# Patient Record
Sex: MALE | Race: BLACK OR AFRICAN AMERICAN | NOT HISPANIC OR LATINO
[De-identification: names, ages, dates, MRNs, and addresses within clinical notes are randomized per-mention and may not be internally consistent; named-entity substitution may affect disease eponyms.]

---

## 2019-06-19 PROBLEM — Z00.00 ENCOUNTER FOR PREVENTIVE HEALTH EXAMINATION: Status: ACTIVE | Noted: 2019-06-19

## 2019-07-02 ENCOUNTER — APPOINTMENT (OUTPATIENT)
Dept: VASCULAR SURGERY | Facility: CLINIC | Age: 69
End: 2019-07-02
Payer: MEDICARE

## 2019-07-02 VITALS
WEIGHT: 315 LBS | HEIGHT: 70 IN | DIASTOLIC BLOOD PRESSURE: 90 MMHG | SYSTOLIC BLOOD PRESSURE: 140 MMHG | BODY MASS INDEX: 45.1 KG/M2

## 2019-07-02 DIAGNOSIS — Z78.9 OTHER SPECIFIED HEALTH STATUS: ICD-10-CM

## 2019-07-02 DIAGNOSIS — Z87.19 PERSONAL HISTORY OF OTHER DISEASES OF THE DIGESTIVE SYSTEM: ICD-10-CM

## 2019-07-02 DIAGNOSIS — Z87.891 PERSONAL HISTORY OF NICOTINE DEPENDENCE: ICD-10-CM

## 2019-07-02 PROCEDURE — 93970 EXTREMITY STUDY: CPT

## 2019-07-02 PROCEDURE — 99203 OFFICE O/P NEW LOW 30 MIN: CPT

## 2019-08-08 NOTE — DATA REVIEWED
[FreeTextEntry1] : Venous duplex on the right no evidence of acute deep venous thrombosis seen lotion B. from the groin to the knee. \par \par Left leg no evidence of acute deep venous thrombosis seen from the groin to the knee the common femorals with facial femoral popliteal veins are patent. There superficial femoral phlebitis seen the greater saphenous vein below the knee. The veins in the calf and not visualized secondary to severe edema.

## 2019-08-08 NOTE — HISTORY OF PRESENT ILLNESS
[FreeTextEntry1] : The patient is a 68-year-old male who was morbidly obese presents with left worse than right lower extremity swelling. The patient has informed me his leg initially swelled after his left total knee replacement. The patient developed a postoperative infection at that time. As patient states his swelling has decreased and now has recurred the last few months.

## 2019-11-08 ENCOUNTER — APPOINTMENT (OUTPATIENT)
Dept: VASCULAR SURGERY | Facility: CLINIC | Age: 69
End: 2019-11-08
Payer: MEDICARE

## 2019-11-08 PROCEDURE — 99212 OFFICE O/P EST SF 10 MIN: CPT

## 2019-11-08 NOTE — ASSESSMENT
[FreeTextEntry1] : 68 y/o gentleman with obesity, and chronic leg swelling presents for follow up. He has been using compression stockings and swelling has been improving.\par I have explained to him that he has venous insufficiency and will benefit from weight loss and compression therapy. No surgical intervention is needed at this time.

## 2019-11-22 ENCOUNTER — INPATIENT (INPATIENT)
Facility: HOSPITAL | Age: 69
LOS: 3 days | Discharge: HOME | End: 2019-11-26
Attending: INTERNAL MEDICINE | Admitting: INTERNAL MEDICINE
Payer: MEDICARE

## 2019-11-22 VITALS
RESPIRATION RATE: 18 BRPM | WEIGHT: 315 LBS | DIASTOLIC BLOOD PRESSURE: 62 MMHG | HEART RATE: 90 BPM | TEMPERATURE: 96 F | OXYGEN SATURATION: 98 % | SYSTOLIC BLOOD PRESSURE: 109 MMHG

## 2019-11-22 DIAGNOSIS — Z90.49 ACQUIRED ABSENCE OF OTHER SPECIFIED PARTS OF DIGESTIVE TRACT: Chronic | ICD-10-CM

## 2019-11-22 LAB
ALBUMIN SERPL ELPH-MCNC: 4 G/DL — SIGNIFICANT CHANGE UP (ref 3.5–5.2)
ALP SERPL-CCNC: 53 U/L — SIGNIFICANT CHANGE UP (ref 30–115)
ALT FLD-CCNC: 40 U/L — SIGNIFICANT CHANGE UP (ref 0–41)
ANION GAP SERPL CALC-SCNC: 12 MMOL/L — SIGNIFICANT CHANGE UP (ref 7–14)
APTT BLD: 26.2 SEC — LOW (ref 27–39.2)
AST SERPL-CCNC: 35 U/L — SIGNIFICANT CHANGE UP (ref 0–41)
BILIRUB SERPL-MCNC: 0.9 MG/DL — SIGNIFICANT CHANGE UP (ref 0.2–1.2)
BLD GP AB SCN SERPL QL: SIGNIFICANT CHANGE UP
BUN SERPL-MCNC: 45 MG/DL — HIGH (ref 10–20)
CALCIUM SERPL-MCNC: 9.1 MG/DL — SIGNIFICANT CHANGE UP (ref 8.5–10.1)
CHLORIDE SERPL-SCNC: 106 MMOL/L — SIGNIFICANT CHANGE UP (ref 98–110)
CO2 SERPL-SCNC: 23 MMOL/L — SIGNIFICANT CHANGE UP (ref 17–32)
CREAT SERPL-MCNC: 1.5 MG/DL — SIGNIFICANT CHANGE UP (ref 0.7–1.5)
GLUCOSE SERPL-MCNC: 96 MG/DL — SIGNIFICANT CHANGE UP (ref 70–99)
HCT VFR BLD CALC: 37.7 % — LOW (ref 42–52)
HGB BLD-MCNC: 12 G/DL — LOW (ref 14–18)
INR BLD: 1.04 RATIO — SIGNIFICANT CHANGE UP (ref 0.65–1.3)
LACTATE SERPL-SCNC: 1.2 MMOL/L — SIGNIFICANT CHANGE UP (ref 0.5–2.2)
LIDOCAIN IGE QN: 19 U/L — SIGNIFICANT CHANGE UP (ref 7–60)
MCHC RBC-ENTMCNC: 27.2 PG — SIGNIFICANT CHANGE UP (ref 27–31)
MCHC RBC-ENTMCNC: 31.8 G/DL — LOW (ref 32–37)
MCV RBC AUTO: 85.5 FL — SIGNIFICANT CHANGE UP (ref 80–94)
NRBC # BLD: 0 /100 WBCS — SIGNIFICANT CHANGE UP (ref 0–0)
PLATELET # BLD AUTO: 143 K/UL — SIGNIFICANT CHANGE UP (ref 130–400)
POTASSIUM SERPL-MCNC: 5.4 MMOL/L — HIGH (ref 3.5–5)
POTASSIUM SERPL-SCNC: 5.4 MMOL/L — HIGH (ref 3.5–5)
PROT SERPL-MCNC: 6.3 G/DL — SIGNIFICANT CHANGE UP (ref 6–8)
PROTHROM AB SERPL-ACNC: 11.9 SEC — SIGNIFICANT CHANGE UP (ref 9.95–12.87)
RBC # BLD: 4.41 M/UL — LOW (ref 4.7–6.1)
RBC # FLD: 14.8 % — HIGH (ref 11.5–14.5)
SODIUM SERPL-SCNC: 141 MMOL/L — SIGNIFICANT CHANGE UP (ref 135–146)
WBC # BLD: 5.27 K/UL — SIGNIFICANT CHANGE UP (ref 4.8–10.8)
WBC # FLD AUTO: 5.27 K/UL — SIGNIFICANT CHANGE UP (ref 4.8–10.8)

## 2019-11-22 PROCEDURE — 74177 CT ABD & PELVIS W/CONTRAST: CPT | Mod: 26

## 2019-11-22 PROCEDURE — 99285 EMERGENCY DEPT VISIT HI MDM: CPT

## 2019-11-22 RX ORDER — INSULIN LISPRO 100/ML
VIAL (ML) SUBCUTANEOUS
Refills: 0 | Status: DISCONTINUED | OUTPATIENT
Start: 2019-11-22 | End: 2019-11-25

## 2019-11-22 RX ORDER — SACUBITRIL AND VALSARTAN 24; 26 MG/1; MG/1
1 TABLET, FILM COATED ORAL
Refills: 0 | Status: DISCONTINUED | OUTPATIENT
Start: 2019-11-22 | End: 2019-11-22

## 2019-11-22 RX ORDER — METOPROLOL TARTRATE 50 MG
25 TABLET ORAL DAILY
Refills: 0 | Status: DISCONTINUED | OUTPATIENT
Start: 2019-11-22 | End: 2019-11-22

## 2019-11-22 RX ORDER — DEXTROSE 50 % IN WATER 50 %
12.5 SYRINGE (ML) INTRAVENOUS ONCE
Refills: 0 | Status: DISCONTINUED | OUTPATIENT
Start: 2019-11-22 | End: 2019-11-26

## 2019-11-22 RX ORDER — CHOLECALCIFEROL (VITAMIN D3) 125 MCG
1000 CAPSULE ORAL DAILY
Refills: 0 | Status: DISCONTINUED | OUTPATIENT
Start: 2019-11-22 | End: 2019-11-26

## 2019-11-22 RX ORDER — PANTOPRAZOLE SODIUM 20 MG/1
40 TABLET, DELAYED RELEASE ORAL
Refills: 0 | Status: DISCONTINUED | OUTPATIENT
Start: 2019-11-22 | End: 2019-11-26

## 2019-11-22 RX ORDER — DEXTROSE 50 % IN WATER 50 %
15 SYRINGE (ML) INTRAVENOUS ONCE
Refills: 0 | Status: DISCONTINUED | OUTPATIENT
Start: 2019-11-22 | End: 2019-11-25

## 2019-11-22 RX ORDER — ASCORBIC ACID 60 MG
500 TABLET,CHEWABLE ORAL DAILY
Refills: 0 | Status: DISCONTINUED | OUTPATIENT
Start: 2019-11-22 | End: 2019-11-26

## 2019-11-22 RX ORDER — ATORVASTATIN CALCIUM 80 MG/1
80 TABLET, FILM COATED ORAL AT BEDTIME
Refills: 0 | Status: DISCONTINUED | OUTPATIENT
Start: 2019-11-22 | End: 2019-11-26

## 2019-11-22 RX ORDER — GLUCAGON INJECTION, SOLUTION 0.5 MG/.1ML
1 INJECTION, SOLUTION SUBCUTANEOUS ONCE
Refills: 0 | Status: DISCONTINUED | OUTPATIENT
Start: 2019-11-22 | End: 2019-11-26

## 2019-11-22 RX ORDER — SODIUM CHLORIDE 9 MG/ML
1000 INJECTION, SOLUTION INTRAVENOUS
Refills: 0 | Status: DISCONTINUED | OUTPATIENT
Start: 2019-11-22 | End: 2019-11-26

## 2019-11-22 RX ORDER — METOPROLOL TARTRATE 50 MG
25 TABLET ORAL
Refills: 0 | Status: DISCONTINUED | OUTPATIENT
Start: 2019-11-23 | End: 2019-11-26

## 2019-11-22 RX ORDER — IOHEXOL 300 MG/ML
30 INJECTION, SOLUTION INTRAVENOUS ONCE
Refills: 0 | Status: COMPLETED | OUTPATIENT
Start: 2019-11-22 | End: 2019-11-22

## 2019-11-22 RX ORDER — PREGABALIN 225 MG/1
1000 CAPSULE ORAL DAILY
Refills: 0 | Status: DISCONTINUED | OUTPATIENT
Start: 2019-11-22 | End: 2019-11-26

## 2019-11-22 RX ADMIN — IOHEXOL 30 MILLILITER(S): 300 INJECTION, SOLUTION INTRAVENOUS at 14:54

## 2019-11-22 RX ADMIN — PANTOPRAZOLE SODIUM 40 MILLIGRAM(S): 20 TABLET, DELAYED RELEASE ORAL at 23:22

## 2019-11-22 NOTE — H&P ADULT - HISTORY OF PRESENT ILLNESS
68 yo M with PMHx of CKD (Unknown baseline), HFrEF (On Entresto),  LGIB secondary to Diverticulitis s/p colon resection (at [facility] about 5 years ago), CAD presenting with Melena.  Patient states that in the evening of 11/20, the patient consumed  peanut brittle and  bright red blood  in the toilet boil mixed with formed stool.  This episode was followed by watery diarrhea, with mixed blood, with a rumbling feeling in his abdomen. He had no pain with defecation at the time.   In the morning he had another episode of bloody diarrhea and came to the ED.  In the ED his hemoglobin was 12, and he was admitted for monitoring and possible intervention.  While in the ED he had one bowel movement that he states was completely black.  He denies recent weight loss, stating that he recently gained >50 pounds due to poor diet. Of note he has a recent RLE cellulitis that he was   treated for by his PMD.  He has not followed up with a GI doctor since his last episode of Diverticulitis, and has not had a colonoscopy in five years.  He denies GERD symptoms or use of NSAIDs. 68 yo M with PMHx of CKD (Unknown baseline), HFrEF (On Entresto), HTN, HLD, DM (A1c 6.5 per patient), LGIB secondary to Diverticulitis s/p colon resection (at [facility] about 5 years ago), CAD presenting with Melena.  Patient states that in the evening of 11/20, the patient consumed  peanut brittle and  bright red blood  in the toilet boil mixed with formed stool.  This episode was followed by watery diarrhea, with mixed blood, with a rumbling feeling in his abdomen. He had no pain with defecation at the time.   In the morning he had another episode of bloody diarrhea and came to the ED.  In the ED his hemoglobin was 12, and he was admitted for monitoring and possible intervention.  While in the ED he had one bowel movement that he states was completely black.  He denies recent weight loss, stating that he recently gained >50 pounds due to poor diet. Of note he has a recent RLE cellulitis that he was   treated for by his PMD.  He has not followed up with a GI doctor since his last episode of Diverticulitis, and has not had a colonoscopy in five years.  He denies GERD symptoms or use of NSAIDs.

## 2019-11-22 NOTE — H&P ADULT - NSHPPHYSICALEXAM_GEN_ALL_CORE
CONSTITUTIONAL: NAD, obese, nontoxic appearing, comfortable siting on stretcher  ENMT: EOMI, PERRLA, No tonsillar erythema, exudates, or enlargement, neck supple, No JVD  PSYCH: Alert & Oriented X3  RESPIRATORY: Clear to percussion bilaterally; No rales, rhonchi, wheezing, or rubs  CARDIOVASCULAR: Regular rate and rhythm; No murmurs, rubs, or gallops, bilateral nonpitting edema   GASTROINTESTINAL: Soft, Nontender, Nondistended; Bowel sounds present  EXTREMITIES:  No clubbing, cyanosis  SKIN: Chronic venous insufficiency changes over bilateral shins GENERAL: NAD, obese, nontoxic appearing, comfortable sitting on bed   PSYCH: Alert & Oriented X3  HEENT:  Atraumatic, Normocephalic. EOMI, PERRLA, conjunctiva clear, sclera white, No JVD  PULMONARY: Clear to auscultation bilaterally; No wheeze  CARDIOVASCULAR: Regular rate and rhythm; No murmurs, rubs, or gallops  GASTROINTESTINAL: Soft, Nontender, Nondistended; Bowel sounds present  MUSCULOSKELETAL:  2+ Peripheral Pulses, No clubbing, cyanosis,  1+ LE Edema B/L   NEUROLOGY: non-focal  SKIN:  chronic venous statis changes on LE B/L, healed RLE lesion (possibly cellulitis)

## 2019-11-22 NOTE — ED PROVIDER NOTE - NS ED ROS FT
Eyes:  No visual changes, eye pain or discharge.  ENMT:  No hearing changes, pain, discharge or infections. No neck pain or stiffness.  Cardiac:  No chest pain, SOB or edema. No chest pain with exertion.  Respiratory:  No cough or respiratory distress. No hemoptysis. No history of asthma or RAD.  GI:  BRBPR, abd pain. No nausea, vomiting, diarrhea.  :  No dysuria, frequency or burning.  MS:  No myalgia, muscle weakness, joint pain or back pain.  Neuro:  No headache or weakness.  No LOC.  Skin:  No skin rash.   Endocrine: No history of thyroid disease or diabetes.

## 2019-11-22 NOTE — H&P ADULT - NSHPSOCIALHISTORY_GEN_ALL_CORE
Recent Travel: Drove to [place], [place] last week     Substance Use (street drugs): Denies   Tobacco Usage:  (   ) never smoked   ( x  ) former smoker   (   ) current smoker  (   40  ) pack year  Alcohol Usage: None

## 2019-11-22 NOTE — ED ADULT NURSE NOTE - NSIMPLEMENTINTERV_GEN_ALL_ED
Implemented All Universal Safety Interventions:  [place] to call system. Call bell, personal items and telephone within reach. Instruct patient to call for assistance. Room bathroom lighting operational. Non-slip footwear when patient is off stretcher. Physically safe environment: no spills, clutter or unnecessary equipment. Stretcher in lowest position, wheels locked, appropriate side rails in place.

## 2019-11-22 NOTE — ED PROVIDER NOTE - CARE PLAN
Principal Discharge DX:	GI bleed Principal Discharge DX:	GI bleed  Secondary Diagnosis:	Rectal bleeding

## 2019-11-22 NOTE — ED PROVIDER NOTE - OBJECTIVE STATEMENT
69 y m pmh cad, diverticuli requiring colon resection pw brbpr. Loose stools mixed with blood coating the toilet bowl for the past few days. Associated with R periumbilical abd pain. No radiation, no alleviating or exacerbating factors. No blood thinner or frequent nsaid use. Denies fever, chills, n/v, back pain, cp, sob, urinary sx.

## 2019-11-22 NOTE — H&P ADULT - ASSESSMENT
68 yo M with PMHx of CKD (Unknown baseline), HFrEF (On Entresto),  LGIB secondary to Diverticulitis s/p colon resection (at [facility] about 5 years ago), CAD presenting with Melena.    #) GIB   -check cbc, pt ptt, bmp (bun:cr >25 suggests ugib)  -maintain active t/s,  txf if hgb <7, 2-18 gauge IV access   -check orthostatic vs  -ivf: will hold off on iv as has HFrEF  -iv ppi bid  -gi eval (egd/colono)   -NPO for now    #)  Right renal calculus  -CT Abdomen and Pelvis-Punctate nonobstructive right renal calculus  -monitor for symptoms, outpatient Nephrology     #) Bilateral inguinal lymph nodes, possibly reactive  -CT A/P- measuring up to 1.3 cm on the left  -outpatient workup    #) HFrEF  -currently with LE edema, lungs CTA B/L  -Takes highest dose Entresto at home, hold if BP drops  -Allergic to ACE, States allergic to Arbs too, but on Entresto  -Takes Lasix 20 mg twice a week, will hold for now, cont BB    #) CAD  -Resume aspirin when GIB resolves, cont statin     # SAGE vs. ?CKD  -f/u Cr in AM  -Avoid nephrotoxic agents     #) Suspected SAIDA  -STOP-BANG >5   -outpatient pulmonary evaluation for PFTs and polysomnography testing     #) Bilateral Lower Extremity Lymphadenopathy, Chronic changes, Swelling  -F/U Venous Duplex    #) Super Obese  -BMI >50  -Counseled on healthy Diet and exercise practices    Diet: NPO for now    CHG     GI ppx: Pantoprazole BID     Disposition: Pending GI Eval 70 yo M with PMHx of CKD (Unknown baseline), HFrEF (On Entresto),  LGIB secondary to Diverticulitis s/p colon resection (at [facility] about 5 years ago), CAD presenting with Melena.    #) GIB   -check cbc, pt ptt, bmp (bun:cr >25 suggests ugib)  -maintain active t/s,  txf if hgb <7, 2-18 gauge IV access   -check orthostatic vs  -ivf: will hold off on iv as has HFrEF  -iv ppi bid  -gi eval (egd/colono)   -NPO for now    #)  Right renal calculus  -CT Abdomen and Pelvis-Punctate nonobstructive right renal calculus  -monitor for symptoms, outpatient Nephrology     #) Bilateral inguinal lymph nodes, possibly reactive  -CT A/P- measuring up to 1.3 cm on the left  -outpatient workup    #) HFrEF  -currently with LE edema, lungs CTA B/L  -Takes highest dose Entresto at home, hold in case BP drops  -If BP elevated restart at lower dose   -Allergic to ACE, States allergic to Arbs too, but takes  Entresto  -Takes Lasix 20 mg twice a week, will hold for now, cont BB    #) CAD  -Hold ASA for now due to active GIB  - cont statin     # SAGE vs. ?CKD  -f/u Cr in AM  -Avoid nephrotoxic agents     #) Suspected SAIDA  -STOP-BANG >5   -outpatient pulmonary evaluation for PFTs and polysomnography testing     #) Bilateral Lower Extremity Lymphadenopathy, Chronic changes, Swelling  -F/U Venous Duplex    #) Super Obese  -BMI >50  -Counseled on healthy Diet and exercise practices    Diet: NPO for now    CHG     GI ppx: Pantoprazole BID     Disposition: Pending GI Eval 68 yo M with PMHx of CKD (Unknown baseline), HFrEF (On Entresto), HTN, HLD, DM (A1c 6.5 per patient), LGIB secondary to Diverticulitis s/p colon resection (at [facility] about 5 years ago), CAD presenting with Melena.     #) GIB   -check cbc, pt ptt, bmp (bun:cr >25 suggests ugib)  -maintain active t/s,  txf if hgb <7, 2-18 gauge IV access   -check orthostatic vs  -ivf: will hold off on iv as has HFrEF  -iv ppi bid  -gi eval (egd/colono)   -NPO for now    #) HFrEF  -currently with LE edema, lungs CTA B/L  -Takes highest dose Entresto at home, hold in case BP drops  -If BP elevated restart at lower dose   -Allergic to ACE, States allergic to Arbs too, but takes  Entresto  -Takes Lasix 20 mg twice a week, will hold for now, cont BB    #) CAD  -Hold ASA for now due to active GIB  - cont statin     # SAGE vs. ?CKD  -f/u Cr in AM  -Avoid nephrotoxic agents     #) DM  -not on oral meds  -check fs  -start and adjust insulin s/s prn     #)  Right renal calculus  -CT Abdomen and Pelvis-Punctate nonobstructive right renal calculus  -monitor for symptoms, outpatient Nephrology     #) Bilateral inguinal lymph nodes, possibly reactive  -CT A/P- measuring up to 1.3 cm on the left  -outpatient workup      #) Suspected SAIDA  -STOP-BANG >5   -outpatient pulmonary evaluation for PFTs and polysomnography testing     #) Bilateral Lower Extremity Lymphadenopathy, Chronic changes, Swelling  -F/U Venous Duplex    #) Super Obese  -BMI >50  -Counseled on healthy Diet and exercise practices    Diet: NPO for now    CHG     GI ppx: Pantoprazole BID     Disposition: Pending GI Eval

## 2019-11-22 NOTE — ED ADULT NURSE REASSESSMENT NOTE - NS ED NURSE REASSESS COMMENT FT1
patient is being transferred to . patient aox3. no indication of pain or discomfort. transported to the floor on a wheelchair.

## 2019-11-22 NOTE — ED PROVIDER NOTE - CLINICAL SUMMARY MEDICAL DECISION MAKING FREE TEXT BOX
Pt with bloody bowel movements, h/o diverticulitis, labs and CT reassuring but pt has continued to have bloody BM in ED.  uncertain pt's reliability to f/u with GI outpt, will admit to med for eval and management

## 2019-11-22 NOTE — H&P ADULT - NSICDXPASTMEDICALHX_GEN_ALL_CORE_FT
PAST MEDICAL HISTORY:  Diabetes     Diverticulitis     Dyslipidemia     Heart failure     Hypertension

## 2019-11-22 NOTE — H&P ADULT - ATTENDING COMMENTS
PHYSICAL EXAM:    CONSTITUTIONAL: NAD, obese, nontoxic appearing, comfortable siting on stretcher  ENMT: EOMI, PERRLA, No tonsillar erythema, exudates, or enlargement, neck supple, No JVD  PSYCH: Alert & Oriented X3  RESPIRATORY: Clear to percussion bilaterally; No rales, rhonchi, wheezing, or rubs  CARDIOVASCULAR: Regular rate and rhythm; No murmurs, rubs, or gallops, bilateral nonpitting edema   GASTROINTESTINAL: Soft, Nontender, Nondistended; Bowel sounds present  EXTREMITIES:  No clubbing, cyanosis  SKIN: Chronic venous insufficiency changes over bilateral shins     68 yo M with PMHx of CKD III, Polysubstance Abuse (clean for 17 years), LGIB secondary to Diverticulitis s/p colon resection (at [facility] 4 years ago), CAD presented with complaint of initial BRBPR followed by melena. History dates back to Wednesday evening after patient consumed peanut brittle, had one formed stool with bright red blood noticed in toilet bowel around 19:00, patient then had watery diarrhea with darker blood coloration around 23:00, felt his stomach "rumbling", denied any pain, fever, nausea, vomiting, endorses chronic chills. Patient then awoke and had another episode of bloody diarrhea around 9AM, reported to ED for further evaluation. Patient's last bowel movement occurred in ED around 19:00 states it was completely "black" in coloration. ROS significant for 50 pound intentional weight gain over the past year secondary to excess caloric consumption, patient has recent suspected RLE cellulitis /p completion of antibiotics last month given by PMD, endorses chronic shortness of breath on exertion for the past 2 years attributed to "sleep apnea" though patient denies having had work up states he knows he needs to go. Patient has traveled to numerous states, having visits [place] 5 days ago, drove.    #GIB  -Patient remains hemodynamically stable  -Awaiting Gastroenterology evaluation   -NPO, advance diet as tolerated  -Continue Protonix IV BID  -Maintain active type and screen  -Bilateral 18 gauge IV access     #Punctate nonobstructive right renal calculus found on CT Abdomen and Pelvis  -Results relayed to patient, advised to monitor for symptoms, defer to outpatient Nephrology management    #Bilateral inguinal lymph nodes, measuring up to 1.3 cm on the left  -Could be reactive?   -Defer to outpatient workup    #CAD    #Suspected Underlying SAIDA  -Advised outpatient pulmonary evaluation for PFTs and polysomnography testing     #Bilateral Lower Extremity Lymphadenopathy   -F/U Venous Duplex    Disposition: Home when medically stable. PHYSICAL EXAM:    CONSTITUTIONAL: NAD, obese, nontoxic appearing, comfortable siting on stretcher  ENMT: EOMI, PERRLA, No tonsillar erythema, exudates, or enlargement, neck supple, No JVD  PSYCH: Alert & Oriented X3  RESPIRATORY: Clear to percussion bilaterally; No rales, rhonchi, wheezing, or rubs  CARDIOVASCULAR: Regular rate and rhythm; No murmurs, rubs, or gallops, bilateral nonpitting edema   GASTROINTESTINAL: Soft, Nontender, Nondistended; Bowel sounds present  EXTREMITIES:  No clubbing, cyanosis  SKIN: Chronic venous insufficiency changes over bilateral shins     68 yo M with PMHx of CKD III, Polysubstance Abuse (clean for 17 years), LGIB secondary to Diverticulitis s/p colon resection (at [facility] 4 years ago), CAD presented with complaint of initial BRBPR followed by melena. History dates back to Wednesday evening after patient consumed peanut brittle, had one formed stool with bright red blood noticed in toilet bowel around 19:00, patient then had watery diarrhea with darker blood coloration around 23:00, felt his stomach "rumbling", denied any pain, fever, nausea, vomiting, endorses chronic chills. Patient then awoke and had another episode of bloody diarrhea around 9AM, reported to ED for further evaluation. Patient's last bowel movement occurred in ED around 19:00 states it was completely "black" in coloration. ROS significant for 50 pound intentional weight gain over the past year secondary to excess caloric consumption, patient has recent suspected RLE cellulitis /p completion of antibiotics last month given by PMD, endorses chronic shortness of breath on exertion for the past 2 years attributed to "sleep apnea" though patient denies having had work up states he knows he needs to go. Patient has traveled to numerous states, having visits [place] 5 days ago, drove.    #GIB  -Patient remains hemodynamically stable  -Awaiting Gastroenterology evaluation   -NPO, advance diet as tolerated  -Continue Protonix IV BID  -Maintain active type and screen  -Bilateral 18 gauge IV access     #Punctate nonobstructive right renal calculus found on CT Abdomen and Pelvis  -Results relayed to patient, advised to monitor for symptoms, defer to outpatient Nephrology management    #Bilateral inguinal lymph nodes, measuring up to 1.3 cm on the left  -Could be reactive?   -Defer to outpatient workup    #CAD    #CKD III  -No known previous creatinine available from [facility]   -Monitor BMP  -Avoid Nephrotoxins    #Suspected Underlying SAIDA  -Advised outpatient pulmonary evaluation for PFTs and polysomnography testing     #Bilateral Lower Extremity Lymphadenopathy   -F/U Venous Duplex    #Obesity  -Diet and lifestyle modifications advised     Disposition: Home when medically stable. PHYSICAL EXAM:    CONSTITUTIONAL: NAD, obese, nontoxic appearing, comfortable siting on stretcher  ENMT: EOMI, PERRLA, No tonsillar erythema, exudates, or enlargement, neck supple, No JVD  PSYCH: Alert & Oriented X3  RESPIRATORY: Clear to percussion bilaterally; No rales, rhonchi, wheezing, or rubs  CARDIOVASCULAR: Regular rate and rhythm; No murmurs, rubs, or gallops, bilateral nonpitting edema   GASTROINTESTINAL: Soft, Nontender, Nondistended; Bowel sounds present  EXTREMITIES:  No clubbing, cyanosis  SKIN: Chronic venous insufficiency changes over bilateral shins     68 yo M with PMHx of CKD III, Polysubstance Abuse (clean for 17 years), LGIB secondary to Diverticulitis s/p colon resection (at [facility] 4 years ago), CAD presented with complaint of initial BRBPR followed by melena. History dates back to Wednesday evening after patient consumed peanut brittle, had one formed stool with bright red blood noticed in toilet bowel around 19:00, patient then had watery diarrhea with darker blood coloration around 23:00, felt his stomach "rumbling", denied any pain, fever, nausea, vomiting, endorses chronic chills. Patient then awoke and had another episode of bloody diarrhea around 9AM, reported to ED for further evaluation. Patient's last bowel movement occurred in ED around 19:00 states it was completely "black" in coloration. ROS significant for 50 pound intentional weight gain over the past year secondary to excess caloric consumption, patient has recent suspected RLE cellulitis /p completion of antibiotics last month given by PMD, endorses chronic shortness of breath on exertion for the past 2 years attributed to "sleep apnea" though patient denies having had work up states he knows he needs to go. Patient has traveled to numerous states, having visits [place] 5 days ago, drove.      #GIB  -Patient remains hemodynamically stable  -Awaiting Gastroenterology evaluation   -NPO, advance diet as tolerated  -Continue Protonix IV BID  -Maintain active type and screen  -Bilateral 18 gauge IV access     #Punctate nonobstructive right renal calculus found on CT Abdomen and Pelvis  -Results relayed to patient, advised to monitor for symptoms, defer to outpatient Nephrology management    #Bilateral inguinal lymph nodes, measuring up to 1.3 cm on the left  -Could be reactive?   -Defer to outpatient workup    #CAD    #CKD III  -No known previous creatinine available from [facility]   -Monitor BMP  -Avoid Nephrotoxins    #Suspected Underlying SAIDA  -Advised outpatient pulmonary evaluation for PFTs and polysomnography testing     #Bilateral Lower Extremity Lymphadenopathy   -F/U Venous Duplex    #Obesity  -Diet and lifestyle modifications advised     Disposition: Home when medically stable. PHYSICAL EXAM:    CONSTITUTIONAL: NAD, obese, nontoxic appearing, comfortable siting on stretcher  ENMT: EOMI, PERRLA, No tonsillar erythema, exudates, or enlargement, neck supple, No JVD  PSYCH: Alert & Oriented X3  RESPIRATORY: Clear to percussion bilaterally; No rales, rhonchi, wheezing, or rubs  CARDIOVASCULAR: Regular rate and rhythm; No murmurs, rubs, or gallops, bilateral nonpitting edema   GASTROINTESTINAL: Soft, Nontender, Nondistended; Bowel sounds present  EXTREMITIES:  No clubbing, cyanosis  SKIN: Chronic venous insufficiency changes over bilateral shins     70 yo M with PMHx of HFrEF, DM II, HTN, HLD, CKD III, Polysubstance Abuse (clean for 17 years), LGIB secondary to Diverticulitis s/p colon resection (at [facility] 4-5 years ago), CAD presented with complaint of initial BRBPR followed by melena. History dates back to Wednesday evening after patient consumed peanut brittle, had one formed stool with bright red blood noticed in toilet bowel around 19:00, patient then had watery diarrhea with darker blood coloration around 23:00, felt his stomach "rumbling", denied any pain, fever, nausea, vomiting, endorses chronic chills. Patient then awoke and had another episode of bloody diarrhea around 9AM, reported to ED for further evaluation. Patient's last bowel movement occurred in ED around 19:00 states it was completely "black" in coloration. ROS significant for 50 pound intentional weight gain over the past year secondary to excess caloric consumption, patient has recent suspected RLE cellulitis /p completion of antibiotics last month given by PMD, endorses chronic shortness of breath on exertion for the past 2 years attributed to "sleep apnea" though patient denies having had work up states he knows he needs to go. Patient has traveled to numerous states, having visits [place] 5 days ago, drove.      #GIB  -Patient remains hemodynamically stable  -Awaiting Gastroenterology evaluation   -NPO, advance diet as tolerated  -Continue Protonix IV BID  -Maintain active type and screen  -Bilateral 18 gauge IV access     #Punctate nonobstructive right renal calculus found on CT Abdomen and Pelvis  -Results relayed to patient, advised to monitor for symptoms, defer to outpatient Nephrology management    #Bilateral inguinal lymph nodes, measuring up to 1.3 cm on the left  -Could be reactive?   -Defer to outpatient workup    #CAD  -ASA on hold, continue high intensity statin, lopressor    #HFrEF, no evidence of overt volume overload  -Patient states he had recent echo completed last week with Dr. Bojorquez, defer to outpatient management  -Strict intake and outputs, daily weight  -Continue Lasix if no further drop in Hgb and patient remains hemodynamically stable    #CKD III  -No known previous creatinine available from [facility]   -Monitor BMP  -Avoid Nephrotoxins    #Suspected Underlying SAIDA  -Advised outpatient pulmonary evaluation for PFTs and polysomnography testing     #Bilateral Lower Extremity Lymphadenopathy   -F/U Venous Duplex    #Obesity  -Diet and lifestyle modifications advised     Disposition: Home when medically stable.

## 2019-11-22 NOTE — H&P ADULT - NSHPLABSRESULTS_GEN_ALL_CORE
=======================================================    Labs:  11-22    141  |  106  |  45<H>  ----------------------------<  96  5.4<H>   |  23  |  1.5    Ca    9.1      22 Nov 2019 14:14    TPro  6.3  /  Alb  4.0  /  TBili  0.9  /  DBili  x   /  AST  35  /  ALT  40  /  AlkPhos  53  11-22                          12.0   5.27  )-----------( 143      ( 22 Nov 2019 15:10 )             37.7       PT/INR - ( 22 Nov 2019 14:14 )   PT: 11.90 sec;   INR: 1.04 ratio         PTT - ( 22 Nov 2019 14:14 )  PTT:26.2 sec    LIVER FUNCTIONS - ( 22 Nov 2019 14:14 )  Alb: 4.0 g/dL / Pro: 6.3 g/dL / ALK PHOS: 53 U/L / ALT: 40 U/L / AST: 35 U/L / GGT: x     CT ABDOMEN AND PELVIS OC IC            PROCEDURE DATE:  11/22/2019    IMPRESSION:    1.  No CT evidence for acute abdominopelvic pathology.    2.  Likely reactive bilateral inguinal lymphadenopathy, greater on the   left.

## 2019-11-22 NOTE — ED PROVIDER NOTE - PHYSICAL EXAMINATION
CONSTITUTIONAL: Well-developed; well-nourished; in no acute distress.   SKIN: warm, dry  HEAD: Normocephalic; atraumatic.  EYES: PERRL, EOMI, normal sclera and conjunctiva   ENT: No nasal discharge; airway clear.  NECK: Supple; non tender.  CARD: S1, S2 normal; no murmurs, gallops, or rubs. Regular rate and rhythm.   RESP: No wheezes, rales or rhonchi.  ABD: soft, nondistended. TTP over RLQ, R periumbilical around surgical scar. No rebound/guarding. No cva tenderness. Dark stool on rectal exam with no hemorrhoids or brbpr.   EXT: Normal ROM.  No clubbing, cyanosis or edema.   LYMPH: No acute cervical adenopathy.  NEURO: Alert, oriented, grossly unremarkable  PSYCH: Cooperative, appropriate.

## 2019-11-22 NOTE — ED ADULT NURSE NOTE - OBJECTIVE STATEMENT
Patient stated he had 4 dark tarry stools since yesterday after straining to have BM. Patient denies Weakness CP SOB Dizziness N/V/D fevers chills abdominal pain and urinary symptoms

## 2019-11-22 NOTE — H&P ADULT - NSHPREVIEWOFSYSTEMS_GEN_ALL_CORE
REVIEW OF SYSTEMS    CONSTITUTIONAL: No weakness, fevers or chills  RESPIRATORY: No cough, wheezing, hemoptysis; No shortness of breath  CARDIOVASCULAR: No chest pain or palpitations  GASTROINTESTINAL: No abdominal or epigastric pain. No nausea, vomiting, or hematemesis; No diarrhea or constipation. No melena or hematochezia.  GENITOURINARY: No dysuria, frequency or hematuria  NEUROLOGICAL: No numbness or weakness  SKIN: No itching, rashes

## 2019-11-22 NOTE — ED PROVIDER NOTE - ATTENDING CONTRIBUTION TO CARE
68 yo m with pmh of diverticulitis, colon resection 4 yrs ago (at [facility]), presents with c/o RLQ pain and bloody diarrhea x 2 days.  pt says he ate peanut brittle on wed night and his symptoms started the next day.  no fever, no chills.  no n/v/d.  no cp no sob.  sees GI in [place].  no asa or ac use.  exam: nad, ncat, perrl, eomi, mmm, rrr, ctab, abd soft, mildly ttp RLQ,nd, obese, aox3, imp: pt with lower abd pain, h/o diverticulitis, now with bloody bm, rectal exam by dr. duron, will check labs and CT a/p

## 2019-11-23 LAB
ALBUMIN SERPL ELPH-MCNC: 3.6 G/DL — SIGNIFICANT CHANGE UP (ref 3.5–5.2)
ALP SERPL-CCNC: 44 U/L — SIGNIFICANT CHANGE UP (ref 30–115)
ALT FLD-CCNC: 32 U/L — SIGNIFICANT CHANGE UP (ref 0–41)
ANION GAP SERPL CALC-SCNC: 10 MMOL/L — SIGNIFICANT CHANGE UP (ref 7–14)
ANION GAP SERPL CALC-SCNC: 12 MMOL/L — SIGNIFICANT CHANGE UP (ref 7–14)
APTT BLD: 31.2 SEC — SIGNIFICANT CHANGE UP (ref 27–39.2)
AST SERPL-CCNC: 22 U/L — SIGNIFICANT CHANGE UP (ref 0–41)
BASOPHILS # BLD AUTO: 0.02 K/UL — SIGNIFICANT CHANGE UP (ref 0–0.2)
BASOPHILS # BLD AUTO: 0.03 K/UL — SIGNIFICANT CHANGE UP (ref 0–0.2)
BASOPHILS # BLD AUTO: 0.03 K/UL — SIGNIFICANT CHANGE UP (ref 0–0.2)
BASOPHILS NFR BLD AUTO: 0.4 % — SIGNIFICANT CHANGE UP (ref 0–1)
BASOPHILS NFR BLD AUTO: 0.6 % — SIGNIFICANT CHANGE UP (ref 0–1)
BASOPHILS NFR BLD AUTO: 0.6 % — SIGNIFICANT CHANGE UP (ref 0–1)
BILIRUB SERPL-MCNC: 1.1 MG/DL — SIGNIFICANT CHANGE UP (ref 0.2–1.2)
BLD GP AB SCN SERPL QL: SIGNIFICANT CHANGE UP
BLD GP AB SCN SERPL QL: SIGNIFICANT CHANGE UP
BUN SERPL-MCNC: 46 MG/DL — HIGH (ref 10–20)
BUN SERPL-MCNC: 46 MG/DL — HIGH (ref 10–20)
CALCIUM SERPL-MCNC: 9 MG/DL — SIGNIFICANT CHANGE UP (ref 8.5–10.1)
CALCIUM SERPL-MCNC: 9.3 MG/DL — SIGNIFICANT CHANGE UP (ref 8.5–10.1)
CHLORIDE SERPL-SCNC: 106 MMOL/L — SIGNIFICANT CHANGE UP (ref 98–110)
CHLORIDE SERPL-SCNC: 107 MMOL/L — SIGNIFICANT CHANGE UP (ref 98–110)
CHOLEST SERPL-MCNC: 91 MG/DL — LOW (ref 100–200)
CO2 SERPL-SCNC: 23 MMOL/L — SIGNIFICANT CHANGE UP (ref 17–32)
CO2 SERPL-SCNC: 25 MMOL/L — SIGNIFICANT CHANGE UP (ref 17–32)
CREAT SERPL-MCNC: 1.5 MG/DL — SIGNIFICANT CHANGE UP (ref 0.7–1.5)
CREAT SERPL-MCNC: 1.5 MG/DL — SIGNIFICANT CHANGE UP (ref 0.7–1.5)
EOSINOPHIL # BLD AUTO: 0.11 K/UL — SIGNIFICANT CHANGE UP (ref 0–0.7)
EOSINOPHIL NFR BLD AUTO: 2.1 % — SIGNIFICANT CHANGE UP (ref 0–8)
EOSINOPHIL NFR BLD AUTO: 2.3 % — SIGNIFICANT CHANGE UP (ref 0–8)
EOSINOPHIL NFR BLD AUTO: 2.4 % — SIGNIFICANT CHANGE UP (ref 0–8)
ESTIMATED AVERAGE GLUCOSE: 128 MG/DL — HIGH (ref 68–114)
GLUCOSE BLDC GLUCOMTR-MCNC: 83 MG/DL — SIGNIFICANT CHANGE UP (ref 70–99)
GLUCOSE BLDC GLUCOMTR-MCNC: 86 MG/DL — SIGNIFICANT CHANGE UP (ref 70–99)
GLUCOSE BLDC GLUCOMTR-MCNC: 90 MG/DL — SIGNIFICANT CHANGE UP (ref 70–99)
GLUCOSE BLDC GLUCOMTR-MCNC: 90 MG/DL — SIGNIFICANT CHANGE UP (ref 70–99)
GLUCOSE BLDC GLUCOMTR-MCNC: 97 MG/DL — SIGNIFICANT CHANGE UP (ref 70–99)
GLUCOSE SERPL-MCNC: 86 MG/DL — SIGNIFICANT CHANGE UP (ref 70–99)
GLUCOSE SERPL-MCNC: 99 MG/DL — SIGNIFICANT CHANGE UP (ref 70–99)
HBA1C BLD-MCNC: 6.1 % — HIGH (ref 4–5.6)
HCT VFR BLD CALC: 34.7 % — LOW (ref 42–52)
HCT VFR BLD CALC: 35.4 % — LOW (ref 42–52)
HCT VFR BLD CALC: 36 % — LOW (ref 42–52)
HDLC SERPL-MCNC: 42 MG/DL — SIGNIFICANT CHANGE UP
HGB BLD-MCNC: 11 G/DL — LOW (ref 14–18)
HGB BLD-MCNC: 11.3 G/DL — LOW (ref 14–18)
HGB BLD-MCNC: 11.6 G/DL — LOW (ref 14–18)
IMM GRANULOCYTES NFR BLD AUTO: 0.2 % — SIGNIFICANT CHANGE UP (ref 0.1–0.3)
INR BLD: 1.07 RATIO — SIGNIFICANT CHANGE UP (ref 0.65–1.3)
LACTATE SERPL-SCNC: 0.7 MMOL/L — SIGNIFICANT CHANGE UP (ref 0.5–2.2)
LIPID PNL WITH DIRECT LDL SERPL: 44 MG/DL — SIGNIFICANT CHANGE UP (ref 4–129)
LYMPHOCYTES # BLD AUTO: 1.56 K/UL — SIGNIFICANT CHANGE UP (ref 1.2–3.4)
LYMPHOCYTES # BLD AUTO: 1.64 K/UL — SIGNIFICANT CHANGE UP (ref 1.2–3.4)
LYMPHOCYTES # BLD AUTO: 2.07 K/UL — SIGNIFICANT CHANGE UP (ref 1.2–3.4)
LYMPHOCYTES # BLD AUTO: 33.3 % — SIGNIFICANT CHANGE UP (ref 20.5–51.1)
LYMPHOCYTES # BLD AUTO: 35.2 % — SIGNIFICANT CHANGE UP (ref 20.5–51.1)
LYMPHOCYTES # BLD AUTO: 39.3 % — SIGNIFICANT CHANGE UP (ref 20.5–51.1)
MAGNESIUM SERPL-MCNC: 2.4 MG/DL — SIGNIFICANT CHANGE UP (ref 1.8–2.4)
MCHC RBC-ENTMCNC: 27.1 PG — SIGNIFICANT CHANGE UP (ref 27–31)
MCHC RBC-ENTMCNC: 27.3 PG — SIGNIFICANT CHANGE UP (ref 27–31)
MCHC RBC-ENTMCNC: 27.6 PG — SIGNIFICANT CHANGE UP (ref 27–31)
MCHC RBC-ENTMCNC: 31.7 G/DL — LOW (ref 32–37)
MCHC RBC-ENTMCNC: 31.9 G/DL — LOW (ref 32–37)
MCHC RBC-ENTMCNC: 32.2 G/DL — SIGNIFICANT CHANGE UP (ref 32–37)
MCV RBC AUTO: 85.5 FL — SIGNIFICANT CHANGE UP (ref 80–94)
MONOCYTES # BLD AUTO: 0.59 K/UL — SIGNIFICANT CHANGE UP (ref 0.1–0.6)
MONOCYTES # BLD AUTO: 0.69 K/UL — HIGH (ref 0.1–0.6)
MONOCYTES # BLD AUTO: 0.71 K/UL — HIGH (ref 0.1–0.6)
MONOCYTES NFR BLD AUTO: 12.6 % — HIGH (ref 1.7–9.3)
MONOCYTES NFR BLD AUTO: 13.5 % — HIGH (ref 1.7–9.3)
MONOCYTES NFR BLD AUTO: 14.8 % — HIGH (ref 1.7–9.3)
NEUTROPHILS # BLD AUTO: 2.19 K/UL — SIGNIFICANT CHANGE UP (ref 1.4–6.5)
NEUTROPHILS # BLD AUTO: 2.34 K/UL — SIGNIFICANT CHANGE UP (ref 1.4–6.5)
NEUTROPHILS # BLD AUTO: 2.39 K/UL — SIGNIFICANT CHANGE UP (ref 1.4–6.5)
NEUTROPHILS NFR BLD AUTO: 44.3 % — SIGNIFICANT CHANGE UP (ref 42.2–75.2)
NEUTROPHILS NFR BLD AUTO: 47 % — SIGNIFICANT CHANGE UP (ref 42.2–75.2)
NEUTROPHILS NFR BLD AUTO: 51 % — SIGNIFICANT CHANGE UP (ref 42.2–75.2)
NRBC # BLD: 0 /100 WBCS — SIGNIFICANT CHANGE UP (ref 0–0)
PHOSPHATE SERPL-MCNC: 4.3 MG/DL — SIGNIFICANT CHANGE UP (ref 2.1–4.9)
PLATELET # BLD AUTO: 140 K/UL — SIGNIFICANT CHANGE UP (ref 130–400)
PLATELET # BLD AUTO: 152 K/UL — SIGNIFICANT CHANGE UP (ref 130–400)
PLATELET # BLD AUTO: 157 K/UL — SIGNIFICANT CHANGE UP (ref 130–400)
POTASSIUM SERPL-MCNC: 5.1 MMOL/L — HIGH (ref 3.5–5)
POTASSIUM SERPL-MCNC: 5.5 MMOL/L — HIGH (ref 3.5–5)
POTASSIUM SERPL-SCNC: 5.1 MMOL/L — HIGH (ref 3.5–5)
POTASSIUM SERPL-SCNC: 5.5 MMOL/L — HIGH (ref 3.5–5)
PROT SERPL-MCNC: 5.8 G/DL — LOW (ref 6–8)
PROTHROM AB SERPL-ACNC: 12.3 SEC — SIGNIFICANT CHANGE UP (ref 9.95–12.87)
RBC # BLD: 4.06 M/UL — LOW (ref 4.7–6.1)
RBC # BLD: 4.14 M/UL — LOW (ref 4.7–6.1)
RBC # BLD: 4.21 M/UL — LOW (ref 4.7–6.1)
RBC # FLD: 14.6 % — HIGH (ref 11.5–14.5)
RBC # FLD: 14.6 % — HIGH (ref 11.5–14.5)
RBC # FLD: 14.7 % — HIGH (ref 11.5–14.5)
SODIUM SERPL-SCNC: 141 MMOL/L — SIGNIFICANT CHANGE UP (ref 135–146)
SODIUM SERPL-SCNC: 142 MMOL/L — SIGNIFICANT CHANGE UP (ref 135–146)
TOTAL CHOLESTEROL/HDL RATIO MEASUREMENT: 2.2 RATIO — LOW (ref 4–5.5)
TRIGL SERPL-MCNC: 62 MG/DL — SIGNIFICANT CHANGE UP (ref 10–149)
TSH SERPL-MCNC: 1.84 UIU/ML — SIGNIFICANT CHANGE UP (ref 0.27–4.2)
WBC # BLD: 4.66 K/UL — LOW (ref 4.8–10.8)
WBC # BLD: 4.69 K/UL — LOW (ref 4.8–10.8)
WBC # BLD: 5.27 K/UL — SIGNIFICANT CHANGE UP (ref 4.8–10.8)
WBC # FLD AUTO: 4.66 K/UL — LOW (ref 4.8–10.8)
WBC # FLD AUTO: 4.69 K/UL — LOW (ref 4.8–10.8)
WBC # FLD AUTO: 5.27 K/UL — SIGNIFICANT CHANGE UP (ref 4.8–10.8)

## 2019-11-23 PROCEDURE — 93970 EXTREMITY STUDY: CPT | Mod: 26

## 2019-11-23 PROCEDURE — 99223 1ST HOSP IP/OBS HIGH 75: CPT

## 2019-11-23 PROCEDURE — 99223 1ST HOSP IP/OBS HIGH 75: CPT | Mod: AI

## 2019-11-23 RX ADMIN — PANTOPRAZOLE SODIUM 40 MILLIGRAM(S): 20 TABLET, DELAYED RELEASE ORAL at 06:03

## 2019-11-23 RX ADMIN — PANTOPRAZOLE SODIUM 40 MILLIGRAM(S): 20 TABLET, DELAYED RELEASE ORAL at 17:11

## 2019-11-23 RX ADMIN — Medication 25 MILLIGRAM(S): at 06:03

## 2019-11-23 RX ADMIN — ATORVASTATIN CALCIUM 80 MILLIGRAM(S): 80 TABLET, FILM COATED ORAL at 21:22

## 2019-11-23 RX ADMIN — PREGABALIN 1000 MICROGRAM(S): 225 CAPSULE ORAL at 11:15

## 2019-11-23 RX ADMIN — Medication 500 MILLIGRAM(S): at 11:15

## 2019-11-23 RX ADMIN — Medication 1000 UNIT(S): at 11:15

## 2019-11-23 NOTE — CONSULT NOTE ADULT - ASSESSMENT
68 yo M with PMHx of CKD 3 , HFrEF (On Entresto, no EF documented), H/O Diverticulosis? s/p colon resection (at [facility] about 5 years ago), CAD on ASA 81 presenting with dark stools and bright red blood per rectum.    # Hematochezia: Dd includes hemorrhoidal bleeding vs diverticular bleed vs AVM vs small bowel bleeding.  Hg stable  Hemodynamically stable  No active bleeding.  GABRIEL: dark green stool    Rec:  Maintain Active Type and Screen   2 large bore 18 gauge IV lines  Closely monitor vital signs and urine output  Trend Hb and Keep it > 8, transfuse PRBC if necessary  Will plan for colonoscopy on Monday or Tuesday.  Will update tomorrow regarding the final schedule. Please keep patient on clear liquids.

## 2019-11-23 NOTE — PROGRESS NOTE ADULT - SUBJECTIVE AND OBJECTIVE BOX
pt seen and examined. had dark stool.   no cp, no sob,   Vital Signs Last 24 Hrs  T(C): 37.2 (23 Nov 2019 05:00), Max: 37.2 (23 Nov 2019 05:00)  T(F): 98.9 (23 Nov 2019 05:00), Max: 98.9 (23 Nov 2019 05:00)  HR: 84 (23 Nov 2019 05:00) (65 - 90)  BP: 133/55 (23 Nov 2019 05:00) (109/62 - 184/76)  BP(mean): --  RR: 18 (23 Nov 2019 05:00) (16 - 18)  SpO2: 99% (23 Nov 2019 08:21) (98% - 99%)  Physical exam:   constitutional NAD, AAOX3, Respiratory  lungs CTA, CVS heart RRR, GI: abdomen Soft NT, ND, BS+, skin: intact  neuro exam non focal. obese                        11.3   4.69  )-----------( 152      ( 23 Nov 2019 08:02 )             35.4   11-23    141  |  106  |  46<H>  ----------------------------<  99  5.5<H>   |  23  |  1.5    Ca    9.0      23 Nov 2019 08:02  Phos  4.3     11-23  Mg     2.4     11-23    TPro  5.8<L>  /  Alb  3.6  /  TBili  1.1  /  DBili  x   /  AST  22  /  ALT  32  /  AlkPhos  44  11-23    a/p  GIB , fu GI , monitor cbc  PAST MEDICAL & SURGICAL HISTORY: cont meds  Diabetes  Dyslipidemia  Hypertension  Heart failure  Diverticulitis  S/P partial resection of colon    #Progress Note Handoff  Pending (specify):  Consults_GI   Family discussion: lisseth pt full code.   Disposition: Home

## 2019-11-23 NOTE — CONSULT NOTE ADULT - SUBJECTIVE AND OBJECTIVE BOX
Gastroenterology Consultation:    Patient is a 69y old  Male who presents with a chief complaint of Melena (22 Nov 2019 21:08)      Admitted on: 11-22-19  HPI:  70 yo M with PMHx of CKD 3 , HFrEF (On Entresto, no EF documented), H/O Diverticulitis s/p colon resection (at [facility] about 5 years ago), CAD on ASA 81 presenting with dark stools.  Patient states that in the evening of 11/20, the patient consumed  peanut brittle and  bright red blood  in the toilet boil mixed with formed stool. This episode was followed by watery diarrhea, with mixed blood, with a rumbling feeling in his abdomen. He had no pain with defecation at the time.  In the morning he had another episode of bloody diarrhea and came to the ED.  In the ED his hemoglobin was 12. While in the ED he had one bowel movement that he states was completely black.    He has not followed up with a GI doctor since his last episode of Diverticulitis, and has not had a colonoscopy in five years.  He denies GERD symptoms or use of NSAIDs.      Prior records Reviewed (Y/N): Y  History obtained from person other than patient (Y/N): N    Prior EGD/Colon: Not done in our hospital  Prior Colonoscopy: 5yrs back in [facility] showed diverticulosis.      PAST MEDICAL & SURGICAL HISTORY:  Diabetes  Dyslipidemia  Hypertension  Heart failure  Diverticulitis  S/P partial resection of colon      FAMILY HISTORY:  FH: heart disease  FH: hyperlipidemia  FH: diabetes mellitus      Social History:  Tobacco:  Alcohol:  Drugs:    Home Medications:  Aspir 81 oral delayed release tablet: 1 tab(s) orally once a day (22 Nov 2019 22:19)  atorvastatin 80 mg oral tablet: 1 tab(s) orally once a day (22 Nov 2019 22:18)  Entresto 97 mg-103 mg oral tablet: 1 tab(s) orally 2 times a day (22 Nov 2019 22:17)  ezetimibe 10 mg oral tablet: 1 tab(s) orally once a day (22 Nov 2019 22:18)  Lasix 20 mg oral tablet: 1 tab(s) orally 2 times a week (22 Nov 2019 22:19)  metoprolol succinate 25 mg oral tablet, extended release: 1 tab(s) orally once a day (22 Nov 2019 22:18)  Vitamin B12 1000 mcg oral tablet: 1 tab(s) orally once a day (22 Nov 2019 22:20)  Vitamin C 1000 mg oral tablet: 1 tab(s) orally once a day (22 Nov 2019 22:19)  Vitamin D3 5000 intl units oral tablet: 1 tab(s) orally once a day (22 Nov 2019 22:20)    MEDICATIONS  (STANDING):  ascorbic acid 500 milliGRAM(s) Oral daily  atorvastatin 80 milliGRAM(s) Oral at bedtime  cholecalciferol 1000 Unit(s) Oral daily  cyanocobalamin 1000 MICROGram(s) Oral daily  dextrose 5%. 1000 milliLiter(s) (50 mL/Hr) IV Continuous <Continuous>  dextrose 50% Injectable 12.5 Gram(s) IV Push once  insulin lispro (HumaLOG) corrective regimen sliding scale   SubCutaneous three times a day before meals  metoprolol tartrate 25 milliGRAM(s) Oral two times a day  pantoprazole  Injectable 40 milliGRAM(s) IV Push two times a day    MEDICATIONS  (PRN):  dextrose 40% Gel 15 Gram(s) Oral once PRN Blood Glucose LESS THAN 70 milliGRAM(s)/deciliter  glucagon  Injectable 1 milliGRAM(s) IntraMuscular once PRN Glucose LESS THAN 70 milligrams/deciliter      Allergies  ACE inhibitors (Angioedema)      Review of Systems:   Constitutional:  No Fever, No Chills  ENT/Mouth:  No Hearing Changes,  No Difficulty Swallowing  Eyes:  No Eye Pain, No Vision Changes  Cardiovascular:  No Chest Pain, No Palpitations  Respiratory:  No Cough, No Dyspnea  Gastrointestinal:  As described in HPI  Musculoskeletal:  No Joint Swelling, No Back Pain  Skin:  No Skin Lesions, No Jaundice  Neuro:  No Syncope, No Dizziness  Heme/Lymph:  No Bruising, No Bleeding.          Physical Examination:  T(C): 37.2 (11-23-19 @ 05:00), Max: 37.2 (11-23-19 @ 05:00)  HR: 84 (11-23-19 @ 05:00) (65 - 90)  BP: 133/55 (11-23-19 @ 05:00) (109/62 - 184/76)  RR: 18 (11-23-19 @ 05:00) (16 - 18)  SpO2: 99% (11-23-19 @ 08:21) (98% - 99%)  Height (cm): 177.8 (11-22-19 @ 22:42)  Weight (kg): 161 (11-22-19 @ 11:58)      Constitutional: No acute distress.  Eyes:. Conjunctivae are clear, Sclera is non-icteric.  Ears Nose and Throat: The external ears are normal appearing,  Oral mucosa is pink and moist.  Respiratory:  No signs of respiratory distress. Lung sounds are clear bilaterally.  Cardiovascular:  S1 S2, Regular rate and rhythm.  GI: Abdomen is soft, symmetric, and non-tender without distention. There are no visible lesions or scars. Bowel sounds are present and normoactive in all four quadrants. No masses, hepatomegaly, or splenomegaly are noted.   Neuro: No Tremor, No involuntary movements  Skin: No rashes, No Jaundice.          Data: (reviewed by attending)                        11.3   4.69  )-----------( 152      ( 23 Nov 2019 08:02 )             35.4     Hgb Trend:  11.3  11-23-19 @ 08:02  11.0  11-22-19 @ 22:55  12.0  11-22-19 @ 15:10      11-23    141  |  106  |  46<H>  ----------------------------<  99  5.5<H>   |  23  |  1.5    Ca    9.0      23 Nov 2019 08:02  Phos  4.3     11-23  Mg     2.4     11-23    TPro  5.8<L>  /  Alb  3.6  /  TBili  1.1  /  DBili  x   /  AST  22  /  ALT  32  /  AlkPhos  44  11-23    Liver panel trend:  TBili 1.1   /   AST 22   /   ALT 32   /   AlkP 44   /   Tptn 5.8   /   Alb 3.6    /   DBili --      11-23  TBili 0.9   /   AST 35   /   ALT 40   /   AlkP 53   /   Tptn 6.3   /   Alb 4.0    /   DBili --      11-22      PT/INR - ( 23 Nov 2019 08:02 )   PT: 12.30 sec;   INR: 1.07 ratio         PTT - ( 23 Nov 2019 08:02 )  PTT:31.2 sec        Radiology:(reviewed by attending)  CT Abdomen and Pelvis w/ Oral Cont and w/ IV Cont:   EXAM:  CT ABDOMEN AND PELVIS OC IC            PROCEDURE DATE:  11/22/2019            INTERPRETATION:  CLINICAL STATEMENT: Abdominal pain      TECHNIQUE: Contiguous axial CT images were obtained from the lower chest   to the pubic symphysis following administration of 100cc Optiray 320   intravenous contrast.  Oral contrast was administered.  Reformatted   images in the coronal and sagittal planes were acquired. Imaging quality   limited by patient body habitus and bilateral arms in imaging field.    COMPARISON CT: CT abdomen and pelvis 8/6/2010    OTHER STUDIES USED FOR CORRELATION: None.       FINDINGS:    LOWER CHEST: Bilateral subsegmental dependent atelectasis.    HEPATOBILIARY: Unremarkable.    SPLEEN: Unremarkable.    PANCREAS: Unremarkable.    ADRENAL GLANDS: Unremarkable.    KIDNEYS: Punctate nonobstructing right renal calculus adjacent to the   renal hilum (series 5-image 169). Symmetric enhancement bilaterally. No   hydronephrosis.    ABDOMINOPELVIC NODES: Bilateral inguinal lymph nodes, measuring up to 1.3   cm on the left.     PELVIC ORGANS: Unremarkable.    PERITONEUM/MESENTERY/BOWEL: Diverticulosis without diverticulitis. No   evidence of bowel wall thickening or bowel obstruction. No   intra-abdominal ascites or free air. Unremarkable appendix.    BONES/SOFT TISSUES: Degenerative changes of the spine. L4 on L5   anterolisthesis. No acute osseous abnormality. Fat-containing umbilical   hernia.     OTHER: Abdominal aortic calcifications.      IMPRESSION:    1.  No CT evidence for acute abdominopelvic pathology.    2.  Likely reactive bilateral inguinal lymphadenopathy, greater on the   left.              JOCY CHAVARRIA M.D., RESIDENT RADIOLOGIST  This document has been electronically signed.  OZIEL TANNER M.D., ATTENDING RADIOLOGIST  This document has been electronically signed. Nov 22 2019  5:46PM             (11-22-19 @ 17:19) Gastroenterology Consultation:    Patient is a 69y old  Male who presents with a chief complaint of Melena (22 Nov 2019 21:08)      Admitted on: 11-22-19  HPI:  70 yo M with PMHx of CKD 3 , HFrEF (On Entresto, no EF documented), H/O Diverticulosis? s/p colon resection (at [facility] about 5 years ago), CAD on ASA 81 presenting with bright red blood per rectum. Patient states that in the evening of 11/20, the patient consumed  peanut brittle and  bright red blood  in the toilet boil mixed with formed stool (1/2 cupfull). This episode was followed by watery diarrhea, with mixed blood, with a rumbling feeling in his abdomen. He had no pain with defecation at the time.  In the morning he had another episode of bloody diarrhea and came to the ED.  In the ED his hemoglobin was 12. While in the ED he had one bowel movement that he states was completely black. He has not followed up with a GI doctor since his last episode of Diverticulosis, and has not had a colonoscopy in five years.  He denies GERD symptoms or use of NSAIDs.      Prior records Reviewed (Y/N): Y  History obtained from person other than patient (Y/N): N    Prior EGD/Colon: Not done in our hospital  Prior Colonoscopy: 5yrs back in [facility] showed diverticulosis?      PAST MEDICAL & SURGICAL HISTORY:  Diabetes  Dyslipidemia  Hypertension  Heart failure  Diverticulitis  S/P partial resection of colon      FAMILY HISTORY:  FH: heart disease  FH: hyperlipidemia  FH: diabetes mellitus      Social History:  Ex smoker quit 15 yrs ago.      Home Medications:  Aspir 81 oral delayed release tablet: 1 tab(s) orally once a day (22 Nov 2019 22:19)  atorvastatin 80 mg oral tablet: 1 tab(s) orally once a day (22 Nov 2019 22:18)  Entresto 97 mg-103 mg oral tablet: 1 tab(s) orally 2 times a day (22 Nov 2019 22:17)  ezetimibe 10 mg oral tablet: 1 tab(s) orally once a day (22 Nov 2019 22:18)  Lasix 20 mg oral tablet: 1 tab(s) orally 2 times a week (22 Nov 2019 22:19)  metoprolol succinate 25 mg oral tablet, extended release: 1 tab(s) orally once a day (22 Nov 2019 22:18)  Vitamin B12 1000 mcg oral tablet: 1 tab(s) orally once a day (22 Nov 2019 22:20)  Vitamin C 1000 mg oral tablet: 1 tab(s) orally once a day (22 Nov 2019 22:19)  Vitamin D3 5000 intl units oral tablet: 1 tab(s) orally once a day (22 Nov 2019 22:20)    MEDICATIONS  (STANDING):  ascorbic acid 500 milliGRAM(s) Oral daily  atorvastatin 80 milliGRAM(s) Oral at bedtime  cholecalciferol 1000 Unit(s) Oral daily  cyanocobalamin 1000 MICROGram(s) Oral daily  dextrose 5%. 1000 milliLiter(s) (50 mL/Hr) IV Continuous <Continuous>  dextrose 50% Injectable 12.5 Gram(s) IV Push once  insulin lispro (HumaLOG) corrective regimen sliding scale   SubCutaneous three times a day before meals  metoprolol tartrate 25 milliGRAM(s) Oral two times a day  pantoprazole  Injectable 40 milliGRAM(s) IV Push two times a day    MEDICATIONS  (PRN):  dextrose 40% Gel 15 Gram(s) Oral once PRN Blood Glucose LESS THAN 70 milliGRAM(s)/deciliter  glucagon  Injectable 1 milliGRAM(s) IntraMuscular once PRN Glucose LESS THAN 70 milligrams/deciliter      Allergies  ACE inhibitors (Angioedema)      Review of Systems:   Constitutional:  No Fever, No Chills  ENT/Mouth:  No Hearing Changes,  No Difficulty Swallowing  Eyes:  No Eye Pain, No Vision Changes  Cardiovascular:  No Chest Pain, No Palpitations  Respiratory:  No Cough, No Dyspnea  Gastrointestinal:  As described in HPI  Musculoskeletal:  No Joint Swelling, No Back Pain  Skin:  No Skin Lesions, No Jaundice  Neuro:  No Syncope, No Dizziness  Heme/Lymph:  No Bruising, No Bleeding.          Physical Examination:  T(C): 37.2 (11-23-19 @ 05:00), Max: 37.2 (11-23-19 @ 05:00)  HR: 84 (11-23-19 @ 05:00) (65 - 90)  BP: 133/55 (11-23-19 @ 05:00) (109/62 - 184/76)  RR: 18 (11-23-19 @ 05:00) (16 - 18)  SpO2: 99% (11-23-19 @ 08:21) (98% - 99%)  Height (cm): 177.8 (11-22-19 @ 22:42)  Weight (kg): 161 (11-22-19 @ 11:58)      Constitutional: No acute distress.  Eyes:. Conjunctivae are clear, Sclera is non-icteric.  Ears Nose and Throat: The external ears are normal appearing,  Oral mucosa is pink and moist.  Respiratory:  No signs of respiratory distress. Lung sounds are clear bilaterally.  Cardiovascular:  S1 S2, Regular rate and rhythm.  GI: Abdomen is soft, symmetric, and non-tender without distention. There are no visible lesions or scars. Bowel sounds are present and normoactive in all four quadrants. No masses, hepatomegaly, or splenomegaly are noted.   GABRIEL: Dark green stools, no blood.  Neuro: No Tremor, No involuntary movements  Skin: No rashes, No Jaundice.      Data: (reviewed by attending)                        11.3   4.69  )-----------( 152      ( 23 Nov 2019 08:02 )             35.4     Hgb Trend:  11.3  11-23-19 @ 08:02  11.0  11-22-19 @ 22:55  12.0  11-22-19 @ 15:10      11-23    141  |  106  |  46<H>  ----------------------------<  99  5.5<H>   |  23  |  1.5    Ca    9.0      23 Nov 2019 08:02  Phos  4.3     11-23  Mg     2.4     11-23    TPro  5.8<L>  /  Alb  3.6  /  TBili  1.1  /  DBili  x   /  AST  22  /  ALT  32  /  AlkPhos  44  11-23    Liver panel trend:  TBili 1.1   /   AST 22   /   ALT 32   /   AlkP 44   /   Tptn 5.8   /   Alb 3.6    /   DBili --      11-23  TBili 0.9   /   AST 35   /   ALT 40   /   AlkP 53   /   Tptn 6.3   /   Alb 4.0    /   DBili --      11-22      PT/INR - ( 23 Nov 2019 08:02 )   PT: 12.30 sec;   INR: 1.07 ratio         PTT - ( 23 Nov 2019 08:02 )  PTT:31.2 sec        Radiology:(reviewed by attending)  CT Abdomen and Pelvis w/ Oral Cont and w/ IV Cont:   EXAM:  CT ABDOMEN AND PELVIS OC IC            PROCEDURE DATE:  11/22/2019            INTERPRETATION:  CLINICAL STATEMENT: Abdominal pain      TECHNIQUE: Contiguous axial CT images were obtained from the lower chest   to the pubic symphysis following administration of 100cc Optiray 320   intravenous contrast.  Oral contrast was administered.  Reformatted   images in the coronal and sagittal planes were acquired. Imaging quality   limited by patient body habitus and bilateral arms in imaging field.    COMPARISON CT: CT abdomen and pelvis 8/6/2010    OTHER STUDIES USED FOR CORRELATION: None.       FINDINGS:    LOWER CHEST: Bilateral subsegmental dependent atelectasis.    HEPATOBILIARY: Unremarkable.    SPLEEN: Unremarkable.    PANCREAS: Unremarkable.    ADRENAL GLANDS: Unremarkable.    KIDNEYS: Punctate nonobstructing right renal calculus adjacent to the   renal hilum (series 5-image [id number]). Symmetric enhancement bilaterally. No   hydronephrosis.    ABDOMINOPELVIC NODES: Bilateral inguinal lymph nodes, measuring up to 1.3   cm on the left.     PELVIC ORGANS: Unremarkable.    PERITONEUM/MESENTERY/BOWEL: Diverticulosis without diverticulitis. No   evidence of bowel wall thickening or bowel obstruction. No   intra-abdominal ascites or free air. Unremarkable appendix.    BONES/SOFT TISSUES: Degenerative changes of the spine. L4 on L5   anterolisthesis. No acute osseous abnormality. Fat-containing umbilical   hernia.     OTHER: Abdominal aortic calcifications.      IMPRESSION:    1.  No CT evidence for acute abdominopelvic pathology.    2.  Likely reactive bilateral inguinal lymphadenopathy, greater on the   left.

## 2019-11-24 LAB
ANION GAP SERPL CALC-SCNC: 12 MMOL/L — SIGNIFICANT CHANGE UP (ref 7–14)
BASOPHILS # BLD AUTO: 0.02 K/UL — SIGNIFICANT CHANGE UP (ref 0–0.2)
BASOPHILS NFR BLD AUTO: 0.5 % — SIGNIFICANT CHANGE UP (ref 0–1)
BUN SERPL-MCNC: 37 MG/DL — HIGH (ref 10–20)
CALCIUM SERPL-MCNC: 8.6 MG/DL — SIGNIFICANT CHANGE UP (ref 8.5–10.1)
CHLORIDE SERPL-SCNC: 105 MMOL/L — SIGNIFICANT CHANGE UP (ref 98–110)
CO2 SERPL-SCNC: 23 MMOL/L — SIGNIFICANT CHANGE UP (ref 17–32)
CREAT SERPL-MCNC: 1.4 MG/DL — SIGNIFICANT CHANGE UP (ref 0.7–1.5)
EOSINOPHIL # BLD AUTO: 0.13 K/UL — SIGNIFICANT CHANGE UP (ref 0–0.7)
EOSINOPHIL NFR BLD AUTO: 3.3 % — SIGNIFICANT CHANGE UP (ref 0–8)
GLUCOSE BLDC GLUCOMTR-MCNC: 105 MG/DL — HIGH (ref 70–99)
GLUCOSE BLDC GLUCOMTR-MCNC: 105 MG/DL — HIGH (ref 70–99)
GLUCOSE BLDC GLUCOMTR-MCNC: 107 MG/DL — HIGH (ref 70–99)
GLUCOSE BLDC GLUCOMTR-MCNC: 78 MG/DL — SIGNIFICANT CHANGE UP (ref 70–99)
GLUCOSE BLDC GLUCOMTR-MCNC: 95 MG/DL — SIGNIFICANT CHANGE UP (ref 70–99)
GLUCOSE SERPL-MCNC: 97 MG/DL — SIGNIFICANT CHANGE UP (ref 70–99)
HCT VFR BLD CALC: 32.4 % — LOW (ref 42–52)
HCV AB S/CO SERPL IA: 0.21 S/CO — SIGNIFICANT CHANGE UP (ref 0–0.99)
HCV AB SERPL-IMP: SIGNIFICANT CHANGE UP
HGB BLD-MCNC: 10.2 G/DL — LOW (ref 14–18)
IMM GRANULOCYTES NFR BLD AUTO: 0.3 % — SIGNIFICANT CHANGE UP (ref 0.1–0.3)
LYMPHOCYTES # BLD AUTO: 1.42 K/UL — SIGNIFICANT CHANGE UP (ref 1.2–3.4)
LYMPHOCYTES # BLD AUTO: 35.8 % — SIGNIFICANT CHANGE UP (ref 20.5–51.1)
MCHC RBC-ENTMCNC: 27.1 PG — SIGNIFICANT CHANGE UP (ref 27–31)
MCHC RBC-ENTMCNC: 31.5 G/DL — LOW (ref 32–37)
MCV RBC AUTO: 86.2 FL — SIGNIFICANT CHANGE UP (ref 80–94)
MONOCYTES # BLD AUTO: 0.56 K/UL — SIGNIFICANT CHANGE UP (ref 0.1–0.6)
MONOCYTES NFR BLD AUTO: 14.1 % — HIGH (ref 1.7–9.3)
NEUTROPHILS # BLD AUTO: 1.83 K/UL — SIGNIFICANT CHANGE UP (ref 1.4–6.5)
NEUTROPHILS NFR BLD AUTO: 46 % — SIGNIFICANT CHANGE UP (ref 42.2–75.2)
NRBC # BLD: 0 /100 WBCS — SIGNIFICANT CHANGE UP (ref 0–0)
PLATELET # BLD AUTO: 139 K/UL — SIGNIFICANT CHANGE UP (ref 130–400)
POTASSIUM SERPL-MCNC: 4.8 MMOL/L — SIGNIFICANT CHANGE UP (ref 3.5–5)
POTASSIUM SERPL-SCNC: 4.8 MMOL/L — SIGNIFICANT CHANGE UP (ref 3.5–5)
RBC # BLD: 3.76 M/UL — LOW (ref 4.7–6.1)
RBC # FLD: 14.7 % — HIGH (ref 11.5–14.5)
SODIUM SERPL-SCNC: 140 MMOL/L — SIGNIFICANT CHANGE UP (ref 135–146)
WBC # BLD: 3.97 K/UL — LOW (ref 4.8–10.8)
WBC # FLD AUTO: 3.97 K/UL — LOW (ref 4.8–10.8)

## 2019-11-24 PROCEDURE — 99233 SBSQ HOSP IP/OBS HIGH 50: CPT

## 2019-11-24 RX ORDER — SOD SULF/SODIUM/NAHCO3/KCL/PEG
4000 SOLUTION, RECONSTITUTED, ORAL ORAL ONCE
Refills: 0 | Status: COMPLETED | OUTPATIENT
Start: 2019-11-24 | End: 2019-11-24

## 2019-11-24 RX ADMIN — ATORVASTATIN CALCIUM 80 MILLIGRAM(S): 80 TABLET, FILM COATED ORAL at 21:19

## 2019-11-24 RX ADMIN — Medication 500 MILLIGRAM(S): at 11:22

## 2019-11-24 RX ADMIN — PREGABALIN 1000 MICROGRAM(S): 225 CAPSULE ORAL at 11:22

## 2019-11-24 RX ADMIN — PANTOPRAZOLE SODIUM 40 MILLIGRAM(S): 20 TABLET, DELAYED RELEASE ORAL at 05:48

## 2019-11-24 RX ADMIN — PANTOPRAZOLE SODIUM 40 MILLIGRAM(S): 20 TABLET, DELAYED RELEASE ORAL at 17:07

## 2019-11-24 RX ADMIN — Medication 4000 MILLILITER(S): at 14:12

## 2019-11-24 RX ADMIN — Medication 1000 UNIT(S): at 11:22

## 2019-11-24 RX ADMIN — Medication 25 MILLIGRAM(S): at 05:48

## 2019-11-24 NOTE — PROGRESS NOTE ADULT - ASSESSMENT
68 yo M with PMHx of HFrEF, DM II, HTN, HLD, CKD III, Polysubstance Abuse (clean for 17 years), LGIB secondary to Diverticulitis s/p colon resection (at [facility] 4-5 years ago), CAD presented with complaint of initial BRBPR followed by melena. History dates back to Wednesday evening after patient consumed peanut brittle, had one formed stool with bright red blood noticed in toilet bowel around 19:00, patient then had watery diarrhea with darker blood coloration around 23:00, felt his stomach "rumbling", denied any pain, fever, nausea, vomiting, endorses chronic chills. Patient then awoke and had another episode of bloody diarrhea around 9AM, reported to ED for further evaluation. Patient's last bowel movement occurred in ED around 19:00 states it was completely "black" in coloration. ROS significant for 50 pound intentional weight gain over the past year secondary to excess caloric consumption, patient has recent suspected RLE cellulitis /p completion of antibiotics last month given by PMD, endorses chronic shortness of breath on exertion for the past 2 years attributed to "sleep apnea" though patient denies having had work up states he knows he needs to go. Patient has traveled to numerous states, having visits [place] 5 days ago, drove.      #GIB  -Patient remains hemodynamically stable  -Awaiting Gastroenterology evaluation   -NPO, advance diet as tolerated  -Continue Protonix IV BID  -Maintain active type and screen  -Bilateral 18 gauge IV access     #Punctate nonobstructive right renal calculus found on CT Abdomen and Pelvis  -Results relayed to patient, advised to monitor for symptoms, defer to outpatient Nephrology management    #Bilateral inguinal lymph nodes, measuring up to 1.3 cm on the left  -Could be reactive?   -Defer to outpatient workup    #CAD  -ASA on hold, continue high intensity statin, lopressor    #HFrEF, no evidence of overt volume overload  -Patient states he had recent echo completed last week with Dr. 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 68 yo M with PMHx of HFrEF, DM II, HTN, HLD, CKD III, Polysubstance Abuse (clean for 17 years), LGIB secondary to Diverticulitis s/p colon resection (at [facility] 4-5 years ago), CAD presented with complaint of initial BRBPR followed by melena. History dates back to Wednesday evening after patient consumed peanut brittle, had one formed stool with bright red blood noticed in toilet bowel around 19:00, patient then had watery diarrhea with darker blood coloration around 23:00, felt his stomach "rumbling", denied any pain, fever, nausea, vomiting, endorses chronic chills. Patient then awoke and had another episode of bloody diarrhea around 9AM, reported to ED for further evaluation. Patient's last bowel movement occurred in ED around 19:00 states it was completely "black" in coloration. ROS significant for 50 pound intentional weight gain over the past year secondary to excess caloric consumption, patient has recent suspected RLE cellulitis /p completion of antibiotics last month given by PMD, endorses chronic shortness of breath on exertion for the past 2 years attributed to "sleep apnea" though patient denies having had work up states he knows he needs to go.    #GIB  -Patient remains hemodynamically stable  -Gastroenterology: Trend Hb and Keep it > 8, transfuse PRBC if necessary, Will plan for colonoscopy on Monday or Tuesday.  -Patient on clears, NPO from midnight tonight  -Continue Protonix IV BID  -Maintain active type and screen  -Bilateral 18 gauge IV access     #Punctate nonobstructive right renal calculus found on CT Abdomen and Pelvis  -Results relayed to patient, advised to monitor for symptoms, defer to outpatient Nephrology management    #Bilateral inguinal lymph nodes, measuring up to 1.3 cm on the left  -Could be reactive?   -Defer to outpatient workup    #CAD  -ASA on hold, continue high intensity statin, lopressor    #HFrEF, no evidence of overt volume overload  -Patient states he had recent echo completed last week with Dr. 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

## 2019-11-24 NOTE — PROGRESS NOTE ADULT - SUBJECTIVE AND OBJECTIVE BOX
Patient Information:  HAMMAD KING / 69y / Male / MRN#:[id number]    Hospital Day: 2d    HPI:  70 yo M with PMHx of CKD (Unknown baseline), HFrEF (On Entresto), HTN, HLD, DM (A1c 6.5 per patient), LGIB secondary to Diverticulitis s/p colon resection (at [facility] about 5 years ago), CAD presenting with Melena.  Patient states that in the evening of 11/20, the patient consumed  peanut brittle and  bright red blood  in the toilet boil mixed with formed stool.  This episode was followed by watery diarrhea, with mixed blood, with a rumbling feeling in his abdomen. He had no pain with defecation at the time.   In the morning he had another episode of bloody diarrhea and came to the ED.  In the ED his hemoglobin was 12, and he was admitted for monitoring and possible intervention.  While in the ED he had one bowel movement that he states was completely black.  He denies recent weight loss, stating that he recently gained >50 pounds due to poor diet. Of note he has a recent RLE cellulitis that he was   treated for by his PMD.  He has not followed up with a GI doctor since his last episode of Diverticulitis, and has not had a colonoscopy in five years.  He denies GERD symptoms or use of NSAIDs.    Interval History:  Patient seen and examined at bedside. No acute events overnight.    Past Medical History:  Diabetes  Dyslipidemia  Hyperlipemia  Hypertension  Heart failure  Diverticulitis  No pertinent past medical history    Past Surgical History:  S/P partial resection of colon  No significant past surgical history    Allergies:  ACE inhibitors (Angioedema)    Medications:  PRN:  dextrose 40% Gel 15 Gram(s) Oral once PRN Blood Glucose LESS THAN 70 milliGRAM(s)/deciliter  glucagon  Injectable 1 milliGRAM(s) IntraMuscular once PRN Glucose LESS THAN 70 milligrams/deciliter    Standing:  ascorbic acid 500 milliGRAM(s) Oral daily  atorvastatin 80 milliGRAM(s) Oral at bedtime  cholecalciferol 1000 Unit(s) Oral daily  cyanocobalamin 1000 MICROGram(s) Oral daily  dextrose 5%. 1000 milliLiter(s) (50 mL/Hr) IV Continuous <Continuous>  dextrose 50% Injectable 12.5 Gram(s) IV Push once  insulin lispro (HumaLOG) corrective regimen sliding scale   SubCutaneous three times a day before meals  metoprolol tartrate 25 milliGRAM(s) Oral two times a day  pantoprazole  Injectable 40 milliGRAM(s) IV Push two times a day    Home:  Aspir 81 oral delayed release tablet: 1 tab(s) orally once a day  atorvastatin 80 mg oral tablet: 1 tab(s) orally once a day  Entresto 97 mg-103 mg oral tablet: 1 tab(s) orally 2 times a day  ezetimibe 10 mg oral tablet: 1 tab(s) orally once a day  Lasix 20 mg oral tablet: 1 tab(s) orally 2 times a week  metoprolol succinate 25 mg oral tablet, extended release: 1 tab(s) orally once a day  Vitamin B12 1000 mcg oral tablet: 1 tab(s) orally once a day  Vitamin C 1000 mg oral tablet: 1 tab(s) orally once a day  Vitamin D3 5000 intl units oral tablet: 1 tab(s) orally once a day    Vitals:  T(C): 36.6, Max: 36.6 (11-23-19 @ 15:34)  T(F): 97.9, Max: 97.9 (11-24-19 @ 05:28)  HR: 66 (63 - 71)  BP: 110/63 (110/63 - 125/71)  RR: 17 (17 - 18)  SpO2: 94% (94% - 94%)    Physical Exam:  General: Awake, Alert. Not in acute distress.  Heart: Regular rate and rhythm; S1, S2; No murmurs.  Lungs: Clear to auscultation bilaterally.  Abdomen: Soft, nontender, nondistended.  Extremities: No edema in upper or lower extremities.  Neuro: AAOx3, No focal deficits.    Labs:  CBC (11-23 @ 17:29)                        Hgb: 11.6   WBC: 5.27  )-----------------( Plts: 157                              Hct: 36.0     Chem (11-23 @ 17:29)  Na: 142  |     Cl: 107     |  BUN: 46  -----------------------------------------< Gluc: 86    K: 5.1   |    HCO3: 25  |  Cr: 1.5    Ca 9.3 (11-23 @ 17:29)  Phos 4.3 (11-23 @ 08:02)  Mg 2.4 (11-23 @ 08:02)    LFTs (11-23 @ 08:02)  TPro 5.8  /  Alb 3.6  TBili 1.1  /  DBili     AST 22  /  ALT 32  /  AlkPhos 44    PT/INR (11-23 @ 08:02)  PT: 12.30; INR: 1.07   PTT: 31.2               Microbiology:    Radiology:

## 2019-11-24 NOTE — PROGRESS NOTE ADULT - ATTENDING COMMENTS
Pending (specify):  Colonoscopy Monday   Family discussion: DAGOBERTO patient   Disposition: Unknown at this time

## 2019-11-25 ENCOUNTER — TRANSCRIPTION ENCOUNTER (OUTPATIENT)
Age: 69
End: 2019-11-25

## 2019-11-25 LAB — GLUCOSE BLDC GLUCOMTR-MCNC: 113 MG/DL — HIGH (ref 70–99)

## 2019-11-25 PROCEDURE — 99233 SBSQ HOSP IP/OBS HIGH 50: CPT

## 2019-11-25 PROCEDURE — 45378 DIAGNOSTIC COLONOSCOPY: CPT

## 2019-11-25 RX ADMIN — PREGABALIN 1000 MICROGRAM(S): 225 CAPSULE ORAL at 11:10

## 2019-11-25 RX ADMIN — Medication 25 MILLIGRAM(S): at 05:47

## 2019-11-25 RX ADMIN — Medication 25 MILLIGRAM(S): at 18:43

## 2019-11-25 RX ADMIN — Medication 1000 UNIT(S): at 11:10

## 2019-11-25 RX ADMIN — Medication 500 MILLIGRAM(S): at 11:10

## 2019-11-25 RX ADMIN — PANTOPRAZOLE SODIUM 40 MILLIGRAM(S): 20 TABLET, DELAYED RELEASE ORAL at 18:42

## 2019-11-25 RX ADMIN — ATORVASTATIN CALCIUM 80 MILLIGRAM(S): 80 TABLET, FILM COATED ORAL at 21:11

## 2019-11-25 RX ADMIN — PANTOPRAZOLE SODIUM 40 MILLIGRAM(S): 20 TABLET, DELAYED RELEASE ORAL at 05:47

## 2019-11-25 NOTE — PRE-ANESTHESIA EVALUATION ADULT - NSANTHOSAYNRD_GEN_A_CORE
No. SAIDA screening performed.  STOP BANG Legend: 0-2 = LOW Risk; 3-4 = INTERMEDIATE Risk; 5-8 = HIGH Risk

## 2019-11-25 NOTE — PROGRESS NOTE ADULT - SUBJECTIVE AND OBJECTIVE BOX
Patient is a 69y old  Male who presents with a chief complaint of Melena (24 Nov 2019 08:57)    OVERNIGHT EVENTS: no acute events overnight, he passed 1 bowel movement overnight    SUBJECTIVE / INTERVAL HPI: Patient seen and examined at bedside.     VITAL SIGNS:  Vital Signs Last 24 Hrs  T(C): 35.8 (25 Nov 2019 04:26), Max: 36.7 (24 Nov 2019 20:55)  T(F): 96.5 (25 Nov 2019 04:26), Max: 98 (24 Nov 2019 20:55)  HR: 77 (25 Nov 2019 04:26) (58 - 77)  BP: 109/57 (25 Nov 2019 04:26) (109/57 - 136/62)  BP(mean): --  RR: 18 (25 Nov 2019 04:26) (18 - 18)  SpO2: 94% (25 Nov 2019 01:58) (94% - 94%)    PHYSICAL EXAM:    General: WDWN  HEENT: NC/AT; PERRL, clear conjunctiva  Neck: supple  Cardiovascular: +S1/S2; RRR  Respiratory: CTA b/l; no W/R/R  Gastrointestinal: soft, NT/ND; +BSx4  Extremities: WWP; 2+ peripheral pulses; no edema   Neurological: AAOx3; no focal deficits    MEDICATIONS:  MEDICATIONS  (STANDING):  ascorbic acid 500 milliGRAM(s) Oral daily  atorvastatin 80 milliGRAM(s) Oral at bedtime  cholecalciferol 1000 Unit(s) Oral daily  cyanocobalamin 1000 MICROGram(s) Oral daily  dextrose 5%. 1000 milliLiter(s) (50 mL/Hr) IV Continuous <Continuous>  dextrose 50% Injectable 12.5 Gram(s) IV Push once  metoprolol tartrate 25 milliGRAM(s) Oral two times a day  pantoprazole  Injectable 40 milliGRAM(s) IV Push two times a day    MEDICATIONS  (PRN):  glucagon  Injectable 1 milliGRAM(s) IntraMuscular once PRN Glucose LESS THAN 70 milligrams/deciliter      ALLERGIES:  Allergies    ACE inhibitors (Angioedema)    Intolerances        LABS:                        10.2   3.97  )-----------( 139      ( 24 Nov 2019 08:09 )             32.4     11-24    140  |  105  |  37<H>  ----------------------------<  97  4.8   |  23  |  1.4    Ca    8.6      24 Nov 2019 08:09          CAPILLARY BLOOD GLUCOSE      POCT Blood Glucose.: 113 mg/dL (25 Nov 2019 08:12)

## 2019-11-25 NOTE — PRE-OP CHECKLIST - SELECT TESTS ORDERED
CMP/BMP/CXR/POCT Blood Glucose/Hepatic Function/Type and Screen/PT/PTT/CBC/EKG/INR BMP/CBC/CMP/PT/PTT/POCT Blood Glucose/INR/Type and Screen/Hepatic Function

## 2019-11-25 NOTE — PROGRESS NOTE ADULT - ASSESSMENT
68 yo M with PMHx of HFrEF, DM II, HTN, HLD, CKD III, Polysubstance Abuse (clean for 17 years), LGIB secondary to Diverticulitis s/p colon resection (at [facility] 4-5 years ago), CAD presented with complaint of initial BRBPR followed by melena.    1. GIB: Pending colonoscopy today. Continue protonix, monitor CBC  2. CAD: Continue statin, lopressor. Restart ASA when cleared by GI  3. Chronic HFrEF: Continue lasix.  4. CKD 3: Monitor Cr  5. Obesity: Counselled on diet and exercise    GI/DVT PPX    #Progress Note Handoff  Pending (specify): Colonoscopy  Family discussion: d/w pt regarding colonoscopy  Disposition: TBD

## 2019-11-25 NOTE — PROGRESS NOTE ADULT - ASSESSMENT
68 yo M with PMHx of HFrEF, DM II, HTN, HLD, CKD III, Polysubstance Abuse (clean for 17 years), LGIB secondary to Diverticulitis s/p colon resection (at [facility] 4-5 years ago), CAD presented with complaint of initial BRBPR followed by melena.     #GIB  - Patient remains hemodynamically stable  - Gastroenterology: Trend Hb and Keep it > 8, transfuse PRBC if necessary,   - scheduled for colonoscopy today  - NPO   - Continue Protonix IV BID  - Maintain active type and screen    # Punctate nonobstructive right renal calculus found on CT Abdomen and Pelvis  - Results relayed to patient, advised to monitor for symptoms, defer to outpatient Nephrology management    #CAD  -ASA on hold, continue high intensity statin, lopressor    #HFrEF, no evidence of overt volume overload  -Patient states he had recent echo completed last week with Dr. 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

## 2019-11-25 NOTE — PROGRESS NOTE ADULT - SUBJECTIVE AND OBJECTIVE BOX
HAMMAD KING  69y, Male  Allergy: ACE inhibitors (Angioedema)    Hospital Day: 3d    Patient seen and examined earlier today. Pending colonoscopy today. No acute events overnight.    PMH/PSH:  PAST MEDICAL & SURGICAL HISTORY:  Diabetes  Dyslipidemia  Hypertension  Heart failure  Diverticulitis  S/P partial resection of colon    VITALS:  T(F): 97.5 (11-25-19 @ 14:38), Max: 98 (11-24-19 @ 20:55)  HR: 68 (11-25-19 @ 14:38)  BP: 123/85 (11-25-19 @ 14:38) (109/57 - 136/62)  RR: 18 (11-25-19 @ 14:38)  SpO2: 94% (11-25-19 @ 01:58)    TESTS & MEASUREMENTS:  Weight (Kg): 157.6 (11-25-19 @ 04:26)  BMI (kg/m2): 49.9 (11-25)                          10.2   3.97  )-----------( 139      ( 24 Nov 2019 08:09 )             32.4       11-24    140  |  105  |  37<H>  ----------------------------<  97  4.8   |  23  |  1.4    Ca    8.6      24 Nov 2019 08:09    RECENT DIAGNOSTIC ORDERS:  Complete Blood Count + Automated Diff: AM Sched. Collection: 26-Nov-2019 04:30 (11-25-19 @ 13:37)  Basic Metabolic Panel: AM Sched. Collection: 26-Nov-2019 04:30 (11-25-19 @ 13:37)  Complete Blood Count + Automated Diff: AM Sched. Collection: 26-Nov-2019 04:30 (11-25-19 @ 13:39)  Basic Metabolic Panel: AM Sched. Collection: 26-Nov-2019 04:30 (11-25-19 @ 13:39)    MEDICATIONS:  MEDICATIONS  (STANDING):  ascorbic acid 500 milliGRAM(s) Oral daily  atorvastatin 80 milliGRAM(s) Oral at bedtime  cholecalciferol 1000 Unit(s) Oral daily  cyanocobalamin 1000 MICROGram(s) Oral daily  dextrose 5%. 1000 milliLiter(s) (50 mL/Hr) IV Continuous <Continuous>  dextrose 50% Injectable 12.5 Gram(s) IV Push once  metoprolol tartrate 25 milliGRAM(s) Oral two times a day  pantoprazole  Injectable 40 milliGRAM(s) IV Push two times a day    MEDICATIONS  (PRN):  glucagon  Injectable 1 milliGRAM(s) IntraMuscular once PRN Glucose LESS THAN 70 milligrams/deciliter    HOME MEDICATIONS:  Aspir 81 oral delayed release tablet (11-22)  atorvastatin 80 mg oral tablet (11-22)  Entresto 97 mg-103 mg oral tablet (11-22)  ezetimibe 10 mg oral tablet (11-22)  Lasix 20 mg oral tablet (11-22)  metoprolol succinate 25 mg oral tablet, extended release (11-22)  Vitamin B12 1000 mcg oral tablet (11-22)  Vitamin C 1000 mg oral tablet (11-22)  Vitamin D3 5000 intl units oral tablet (11-22)    REVIEW OF SYSTEMS:  All other review of systems is negative unless indicated above.     PHYSICAL EXAM:  GENERAL: NAD  HEENT: No Swelling  CHEST/LUNG: Good air entry, No wheezing  HEART: RRR, No murmurs  ABDOMEN: Soft, Bowel sounds present  EXTREMITIES:  No clubbing

## 2019-11-26 ENCOUNTER — TRANSCRIPTION ENCOUNTER (OUTPATIENT)
Age: 69
End: 2019-11-26

## 2019-11-26 VITALS — DIASTOLIC BLOOD PRESSURE: 58 MMHG | HEART RATE: 74 BPM | SYSTOLIC BLOOD PRESSURE: 105 MMHG

## 2019-11-26 LAB
ANION GAP SERPL CALC-SCNC: 14 MMOL/L — SIGNIFICANT CHANGE UP (ref 7–14)
BASOPHILS # BLD AUTO: 0.03 K/UL — SIGNIFICANT CHANGE UP (ref 0–0.2)
BASOPHILS NFR BLD AUTO: 0.6 % — SIGNIFICANT CHANGE UP (ref 0–1)
BUN SERPL-MCNC: 26 MG/DL — HIGH (ref 10–20)
CALCIUM SERPL-MCNC: 8.8 MG/DL — SIGNIFICANT CHANGE UP (ref 8.5–10.1)
CHLORIDE SERPL-SCNC: 105 MMOL/L — SIGNIFICANT CHANGE UP (ref 98–110)
CO2 SERPL-SCNC: 23 MMOL/L — SIGNIFICANT CHANGE UP (ref 17–32)
CREAT SERPL-MCNC: 1.6 MG/DL — HIGH (ref 0.7–1.5)
EOSINOPHIL # BLD AUTO: 0.14 K/UL — SIGNIFICANT CHANGE UP (ref 0–0.7)
EOSINOPHIL NFR BLD AUTO: 2.7 % — SIGNIFICANT CHANGE UP (ref 0–8)
GLUCOSE SERPL-MCNC: 98 MG/DL — SIGNIFICANT CHANGE UP (ref 70–99)
HCT VFR BLD CALC: 29 % — LOW (ref 42–52)
HCT VFR BLD CALC: 31.8 % — LOW (ref 42–52)
HGB BLD-MCNC: 10 G/DL — LOW (ref 14–18)
HGB BLD-MCNC: 9.1 G/DL — LOW (ref 14–18)
IMM GRANULOCYTES NFR BLD AUTO: 0.4 % — HIGH (ref 0.1–0.3)
LYMPHOCYTES # BLD AUTO: 2.08 K/UL — SIGNIFICANT CHANGE UP (ref 1.2–3.4)
LYMPHOCYTES # BLD AUTO: 39.5 % — SIGNIFICANT CHANGE UP (ref 20.5–51.1)
MCHC RBC-ENTMCNC: 27.1 PG — SIGNIFICANT CHANGE UP (ref 27–31)
MCHC RBC-ENTMCNC: 27.4 PG — SIGNIFICANT CHANGE UP (ref 27–31)
MCHC RBC-ENTMCNC: 31.4 G/DL — LOW (ref 32–37)
MCHC RBC-ENTMCNC: 31.4 G/DL — LOW (ref 32–37)
MCV RBC AUTO: 86.3 FL — SIGNIFICANT CHANGE UP (ref 80–94)
MCV RBC AUTO: 87.1 FL — SIGNIFICANT CHANGE UP (ref 80–94)
MONOCYTES # BLD AUTO: 0.48 K/UL — SIGNIFICANT CHANGE UP (ref 0.1–0.6)
MONOCYTES NFR BLD AUTO: 9.1 % — SIGNIFICANT CHANGE UP (ref 1.7–9.3)
NEUTROPHILS # BLD AUTO: 2.51 K/UL — SIGNIFICANT CHANGE UP (ref 1.4–6.5)
NEUTROPHILS NFR BLD AUTO: 47.7 % — SIGNIFICANT CHANGE UP (ref 42.2–75.2)
NRBC # BLD: 0 /100 WBCS — SIGNIFICANT CHANGE UP (ref 0–0)
NRBC # BLD: 0 /100 WBCS — SIGNIFICANT CHANGE UP (ref 0–0)
PLATELET # BLD AUTO: 139 K/UL — SIGNIFICANT CHANGE UP (ref 130–400)
PLATELET # BLD AUTO: 165 K/UL — SIGNIFICANT CHANGE UP (ref 130–400)
POTASSIUM SERPL-MCNC: 4.7 MMOL/L — SIGNIFICANT CHANGE UP (ref 3.5–5)
POTASSIUM SERPL-SCNC: 4.7 MMOL/L — SIGNIFICANT CHANGE UP (ref 3.5–5)
RBC # BLD: 3.36 M/UL — LOW (ref 4.7–6.1)
RBC # BLD: 3.65 M/UL — LOW (ref 4.7–6.1)
RBC # FLD: 14.4 % — SIGNIFICANT CHANGE UP (ref 11.5–14.5)
RBC # FLD: 14.6 % — HIGH (ref 11.5–14.5)
SODIUM SERPL-SCNC: 142 MMOL/L — SIGNIFICANT CHANGE UP (ref 135–146)
WBC # BLD: 3.99 K/UL — LOW (ref 4.8–10.8)
WBC # BLD: 5.26 K/UL — SIGNIFICANT CHANGE UP (ref 4.8–10.8)
WBC # FLD AUTO: 3.99 K/UL — LOW (ref 4.8–10.8)
WBC # FLD AUTO: 5.26 K/UL — SIGNIFICANT CHANGE UP (ref 4.8–10.8)

## 2019-11-26 PROCEDURE — 99232 SBSQ HOSP IP/OBS MODERATE 35: CPT

## 2019-11-26 PROCEDURE — 99239 HOSP IP/OBS DSCHRG MGMT >30: CPT

## 2019-11-26 RX ORDER — PANTOPRAZOLE SODIUM 20 MG/1
1 TABLET, DELAYED RELEASE ORAL
Qty: 0 | Refills: 0 | DISCHARGE
Start: 2019-11-26

## 2019-11-26 RX ORDER — FUROSEMIDE 40 MG
20 TABLET ORAL
Refills: 0 | Status: DISCONTINUED | OUTPATIENT
Start: 2019-11-26 | End: 2019-11-26

## 2019-11-26 RX ORDER — ASPIRIN/CALCIUM CARB/MAGNESIUM 324 MG
81 TABLET ORAL DAILY
Refills: 0 | Status: DISCONTINUED | OUTPATIENT
Start: 2019-11-26 | End: 2019-11-26

## 2019-11-26 RX ORDER — SACUBITRIL AND VALSARTAN 24; 26 MG/1; MG/1
1 TABLET, FILM COATED ORAL
Refills: 0 | Status: DISCONTINUED | OUTPATIENT
Start: 2019-11-26 | End: 2019-11-26

## 2019-11-26 RX ORDER — PANTOPRAZOLE SODIUM 20 MG/1
40 TABLET, DELAYED RELEASE ORAL
Refills: 0 | Status: DISCONTINUED | OUTPATIENT
Start: 2019-11-26 | End: 2019-11-26

## 2019-11-26 RX ADMIN — Medication 500 MILLIGRAM(S): at 11:30

## 2019-11-26 RX ADMIN — Medication 1000 UNIT(S): at 11:30

## 2019-11-26 RX ADMIN — PANTOPRAZOLE SODIUM 40 MILLIGRAM(S): 20 TABLET, DELAYED RELEASE ORAL at 05:15

## 2019-11-26 RX ADMIN — PREGABALIN 1000 MICROGRAM(S): 225 CAPSULE ORAL at 11:31

## 2019-11-26 RX ADMIN — Medication 25 MILLIGRAM(S): at 05:15

## 2019-11-26 RX ADMIN — Medication 81 MILLIGRAM(S): at 11:30

## 2019-11-26 NOTE — DISCHARGE NOTE NURSING/CASE MANAGEMENT/SOCIAL WORK - PATIENT PORTAL LINK FT
You can access the FollowMyHealth Patient Portal offered by [facility] by registering at the following website: [email or web address]. By joining [employer]’s FollowMyHealth portal, you will also be able to view your health information using other applications (apps) compatible with our system.

## 2019-11-26 NOTE — PROGRESS NOTE ADULT - ASSESSMENT
70 yo M with PMHx of HFrEF, DM II, HTN, HLD, CKD III, Polysubstance Abuse (clean for 17 years), LGIB secondary to Diverticulitis s/p colon resection (at [facility] 4-5 years ago), CAD presented with complaint of initial BRBPR followed by melena.     #GIB  - colonoscopy showed: Excavated lesions Multiple non-bleeding diverticula with medium openings were seen in the whole colon. Diverticulosis appeared to be of moderate severity. No  active bleeding noted. Protruding lesions Medium non-bleeding internal and external hemorrhoids were noted. Additional findings maroon color blood throughout the colon but as we reached the surgical anastomosis in the small intestine there was some some brown stools seen for the first time, making a more proximal bleed less likely..   - Patient remains hemodynamically stable  - Gastroenterology: Trend Hb and Keep it > 8, transfuse PRBC if necessary,   - advance to regular diet  - resume aspirin 81 mg po od  - switch to protonix 40 mg po od  - Maintain active type and screen    # Punctate nonobstructive right renal calculus found on CT Abdomen and Pelvis  - Results relayed to patient, advised to monitor for symptoms, defer to outpatient Nephrology management    #CAD  - continue high intensity statin, lopressor    #HFrEF, no evidence of overt volume overload  -Patient states he had recent echo completed last week with Dr. 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 70 yo M with PMHx of HFrEF, DM II, HTN, HLD, CKD III, Polysubstance Abuse (clean for 17 years), LGIB secondary to Diverticulitis s/p colon resection (at [facility] 4-5 years ago), CAD presented with complaint of initial BRBPR followed by melena.     #GIB  - colonoscopy showed: Excavated lesions Multiple non-bleeding diverticula with medium openings were seen in the whole colon. Diverticulosis appeared to be of moderate severity. No  active bleeding noted. Protruding lesions Medium non-bleeding internal and external hemorrhoids were noted. Additional findings maroon color blood throughout the colon but as we reached the surgical anastomosis in the small intestine there was some some brown stools seen for the first time, making a more proximal bleed less likely..   - Patient remains hemodynamically stable  - Gastroenterology: Trend Hb and Keep it > 8, transfuse PRBC if necessary,   - advance to regular diet  - resume aspirin 81 mg po od  - switch to protonix 40 mg po od  - Maintain active type and screen    # Punctate nonobstructive right renal calculus found on CT Abdomen and Pelvis  - Results relayed to patient, advised to monitor for symptoms, defer to outpatient Nephrology management    #CAD  - continue high intensity statin, lopressor    #HFrEF, no evidence of overt volume overload  -Patient states he had recent echo completed last week with Dr. 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

## 2019-11-26 NOTE — PROGRESS NOTE ADULT - ASSESSMENT
68 yo M with PMHx of CKD 3 , HFrEF (On Entresto, no EF documented), H/O Diverticulosis? s/p colon resection (at [facility] about 5 years ago), CAD on ASA 81 presenting with dark stools and bright red blood per rectum.    # Hematochezia: likely secondary to diverticulosis  resolved  Hg stable  Hemodynamically stable      Rec:  can advance the diet to regular.  Avoid constipation  Can give miralax TID PRN for 1 soft, BM everyday.  Will need a repeat Colonoscopy as outpatient with good prep  Please f/u in GI MAP clinic with next available appointment. 68 yo M with PMHx of CKD 3 , HFrEF (On Entresto, no EF documented), H/O Diverticulosis? s/p colon resection (at [facility] about 5 years ago), CAD on ASA 81 presenting with dark stools and bright red blood per rectum.    # Hematochezia: likely secondary to diverticulosis  resolved  Hg stable  Hemodynamically stable      Rec:  can advance the diet to regular.  Avoid constipation  Can give miralax TID PRN for 1 soft, BM every day.  Will need a repeat Colonoscopy as outpatient with better prep  Please f/u in GI MAP clinic or in Dr. Munguia's office with next available appointment.

## 2019-11-26 NOTE — DISCHARGE NOTE PROVIDER - NSDCCPTREATMENT_GEN_ALL_CORE_FT
PRINCIPAL PROCEDURE  Procedure: Colonoscopy  Findings and Treatment: no source of active bleed  there is internal and external hemorrhoids

## 2019-11-26 NOTE — DISCHARGE NOTE PROVIDER - NSDCMRMEDTOKEN_GEN_ALL_CORE_FT
Aspir 81 oral delayed release tablet: 1 tab(s) orally once a day  atorvastatin 80 mg oral tablet: 1 tab(s) orally once a day  Entresto 97 mg-103 mg oral tablet: 1 tab(s) orally 2 times a day  ezetimibe 10 mg oral tablet: 1 tab(s) orally once a day  Lasix 20 mg oral tablet: 1 tab(s) orally 2 times a week  metoprolol succinate 25 mg oral tablet, extended release: 1 tab(s) orally once a day  pantoprazole 40 mg oral delayed release tablet: 1 tab(s) orally once a day (before a meal)  Vitamin B12 1000 mcg oral tablet: 1 tab(s) orally once a day  Vitamin C 1000 mg oral tablet: 1 tab(s) orally once a day  Vitamin D3 5000 intl units oral tablet: 1 tab(s) orally once a day

## 2019-11-26 NOTE — DISCHARGE NOTE PROVIDER - CARE PROVIDER_API CALL
Dominguez Munguia)  Gastroenterology  [address] [place] [address]  Phone: [phone]  Fax: [phone]  Established Patient  Follow Up Time: 2 weeks

## 2019-11-26 NOTE — PROGRESS NOTE ADULT - ATTENDING COMMENTS
I saw and evaluated patient  by bedside, no c/o abdominal pain,  tolerating diet well. h/h remains stable.   All labs, radiology studies, VS was reviewed  I have reviewed the resident's note and agree with documented findings and  plan of care.  #Acute blood loss anemia due to Lower GI bleed- s/p Colonoscopy-diverticulosis, internal and external hemorrhoids.  as per GI recommendations- patient will need outpatient f/up.  # Morbid Obesity- with SAIDA- patient refuses to use nocturnal CPAP, but agrees for weight loss tx.  #Chronic Non-pitting lower extremities edema- resumed on lasix tx.  #Mild SAGE most likely prerenal - patient was resumed on lasix tx. - recommended to repeat BMP in 1 week post d/c home.  #h/o HTN, CAD- stable, resumed on home regimen tx.    d/c plan: patient is medically stable for d/c home today. Patient verbalized good understanding of discharge instructions and agreed with discharge plan.

## 2019-11-26 NOTE — DISCHARGE NOTE PROVIDER - NSDCCPCAREPLAN_GEN_ALL_CORE_FT
PRINCIPAL DISCHARGE DIAGNOSIS  Diagnosis: GI bleed  Assessment and Plan of Treatment: you were diagnosed with Lower GI bleed  colonoscopy performed and it did not show any active bleed.   you must follow up with your Gi doctor in 2 weeks  if you experience a reccurent bleed, do not hesitate to come again to the ER

## 2019-11-26 NOTE — PHARMACOTHERAPY INTERVENTION NOTE - COMMENTS
entresto order      allergy: ACE inhibitors( angiodema as per dr janet slaughter). I s/w dr janet slaughter, said pt takes at home and was recommended by cardiologist and it's ok to give

## 2019-11-26 NOTE — PROGRESS NOTE ADULT - SUBJECTIVE AND OBJECTIVE BOX
Gastroenterology progress note:     Patient is a 69y old  Male who presents with a chief complaint of Melena and we are following the patient for the same.    Admitted on: 11-22-19    Interval History: patient had Colonoscopy yesterday and showed maroon color blood throughout the colon with some brown stools in the surgical anastomosis. Patient had no BM overnight, and is tolerating clear liquids.    Patient's medical problems are improving   Prior records reviewed (Y/N): Y  History obtained from someone other than patient (Y/N): N      PAST MEDICAL & SURGICAL HISTORY:  Diabetes  Dyslipidemia  Hypertension  Heart failure  Diverticulitis  S/P partial resection of colon      MEDICATIONS  (STANDING):  ascorbic acid 500 milliGRAM(s) Oral daily  aspirin enteric coated 81 milliGRAM(s) Oral daily  atorvastatin 80 milliGRAM(s) Oral at bedtime  cholecalciferol 1000 Unit(s) Oral daily  cyanocobalamin 1000 MICROGram(s) Oral daily  dextrose 5%. 1000 milliLiter(s) (50 mL/Hr) IV Continuous <Continuous>  dextrose 50% Injectable 12.5 Gram(s) IV Push once  furosemide    Tablet 20 milliGRAM(s) Oral two times a day  metoprolol tartrate 25 milliGRAM(s) Oral two times a day  pantoprazole    Tablet 40 milliGRAM(s) Oral before breakfast  sacubitril 97 mG/valsartan 103 mG 1 Tablet(s) Oral two times a day    MEDICATIONS  (PRN):  glucagon  Injectable 1 milliGRAM(s) IntraMuscular once PRN Glucose LESS THAN 70 milligrams/deciliter      Allergies  ACE inhibitors (Angioedema)      Review of Systems:   Cardiovascular:  No Chest Pain, No Palpitations  Respiratory:  No Cough, No Dyspnea  Gastrointestinal:  As described in HPI    Physical Examination:  T(C): 36.2 (11-26-19 @ 12:46), Max: 36.4 (11-25-19 @ 15:21)  HR: 73 (11-26-19 @ 12:46) (60 - 73)  BP: 125/72 (11-26-19 @ 12:46) (68/45 - 125/72)  RR: 18 (11-26-19 @ 12:46) (18 - 18)  SpO2: 97% (11-26-19 @ 08:19) (97% - 100%)  Weight (kg): 157.6 (11-25-19 @ 15:21)    Constitutional: No acute distress.  Respiratory:  No signs of respiratory distress. Lung sounds are clear bilaterally.  Cardiovascular:  S1 S2, Regular rate and rhythm.  Abdominal: Abdomen is soft, symmetric, and non-tender without distention. There are no visible lesions or scars. Bowel sounds are present and normoactive in all four quadrants. No masses, hepatomegaly, or splenomegaly are noted.   Skin: No rashes, No Jaundice.        Data: (reviewed by attending)                        10.0   5.26  )-----------( 165      ( 26 Nov 2019 07:14 )             31.8     Hgb trend:  10.0  11-26-19 @ 07:14  9.1  11-25-19 @ 20:00  10.2  11-24-19 @ 08:09  11.6  11-23-19 @ 17:29      11-26    142  |  105  |  26<H>  ----------------------------<  98  4.7   |  23  |  1.6<H>    Ca    8.8      26 Nov 2019 07:14      Liver panel trend:  TBili 1.1   /   AST 22   /   ALT 32   /   AlkP 44   /   Tptn 5.8   /   Alb 3.6    /   DBili --      11-23  TBili 0.9   /   AST 35   /   ALT 40   /   AlkP 53   /   Tptn 6.3   /   Alb 4.0    /   DBili --      11-22             Radiology: (reviewed by attending) Gastroenterology progress note:     Patient is a 69y old  Male who presents with a chief complaint of Melena and we are following the patient for the same.    Admitted on: 11-22-19    Interval History: patient had Colonoscopy yesterday and showed maroon color blood throughout the colon with some brown stools in the surgical anastomosis. Patient had no BM overnight, and is tolerating clear liquids.    Patient's medical problems are improving   Prior records reviewed (Y/N): Y  History obtained from someone other than patient (Y/N): N      PAST MEDICAL & SURGICAL HISTORY:  Diabetes  Dyslipidemia  Hypertension  Heart failure  Diverticulitis  S/P partial resection of colon      MEDICATIONS  (STANDING):  ascorbic acid 500 milliGRAM(s) Oral daily  aspirin enteric coated 81 milliGRAM(s) Oral daily  atorvastatin 80 milliGRAM(s) Oral at bedtime  cholecalciferol 1000 Unit(s) Oral daily  cyanocobalamin 1000 MICROGram(s) Oral daily  dextrose 5%. 1000 milliLiter(s) (50 mL/Hr) IV Continuous <Continuous>  dextrose 50% Injectable 12.5 Gram(s) IV Push once  furosemide    Tablet 20 milliGRAM(s) Oral two times a day  metoprolol tartrate 25 milliGRAM(s) Oral two times a day  pantoprazole    Tablet 40 milliGRAM(s) Oral before breakfast  sacubitril 97 mG/valsartan 103 mG 1 Tablet(s) Oral two times a day    MEDICATIONS  (PRN):  glucagon  Injectable 1 milliGRAM(s) IntraMuscular once PRN Glucose LESS THAN 70 milligrams/deciliter      Allergies  ACE inhibitors (Angioedema)      Review of Systems:   Cardiovascular:  No Chest Pain, No Palpitations  Respiratory:  No Cough, No Dyspnea  Gastrointestinal:  As described in HPI    Physical Examination:  T(C): 36.2 (11-26-19 @ 12:46), Max: 36.4 (11-25-19 @ 15:21)  HR: 73 (11-26-19 @ 12:46) (60 - 73)  BP: 125/72 (11-26-19 @ 12:46) (68/45 - 125/72)  RR: 18 (11-26-19 @ 12:46) (18 - 18)  SpO2: 97% (11-26-19 @ 08:19) (97% - 100%)  Weight (kg): 157.6 (11-25-19 @ 15:21)    Constitutional: No acute distress.  Respiratory:  No signs of respiratory distress. Lung sounds are clear bilaterally.  Cardiovascular:  S1 S2, Regular rate and rhythm.  Abdominal: Abdomen is soft, symmetric, and non-tender without distention. There are no visible lesions or scars. Bowel sounds are present and normoactive in all four quadrants. No masses, hepatomegaly, or splenomegaly are noted.   Skin: No rashes, No Jaundice.        Data: (reviewed by attending)                        10.0   5.26  )-----------( 165      ( 26 Nov 2019 07:14 )             31.8     Hgb trend:  10.0  11-26-19 @ 07:14  9.1  11-25-19 @ 20:00  10.2  11-24-19 @ 08:09  11.6  11-23-19 @ 17:29      11-26    142  |  105  |  26<H>  ----------------------------<  98  4.7   |  23  |  1.6<H>    Ca    8.8      26 Nov 2019 07:14      Liver panel trend:  TBili 1.1   /   AST 22   /   ALT 32   /   AlkP 44   /   Tptn 5.8   /   Alb 3.6    /   DBili --      11-23  TBili 0.9   /   AST 35   /   ALT 40   /   AlkP 53   /   Tptn 6.3   /   Alb 4.0    /   DBili --      11-22

## 2019-11-26 NOTE — DISCHARGE NOTE PROVIDER - NSDCFUSCHEDAPPT_GEN_ALL_CORE_FT
HAMMAD KING ; 12/06/2019 ; [facility] [place] [address] HAMMAD KING ; 12/06/2019 ; [facility] [place] [address] HAMMAD KING ; 12/06/2019 ; [facility] [place] [address]

## 2019-11-26 NOTE — PROGRESS NOTE ADULT - SUBJECTIVE AND OBJECTIVE BOX
Patient is a 69y old  Male who presents with a chief complaint of Melena (24 Nov 2019 08:57)    OVERNIGHT EVENTS: no acute events overnight post colonoscopy    SUBJECTIVE / INTERVAL HPI: Patient seen and examined at bedside.    VITAL SIGNS:  Vital Signs Last 24 Hrs  T(C): 36.4 (26 Nov 2019 05:10), Max: 36.4 (25 Nov 2019 14:38)  T(F): 97.6 (26 Nov 2019 05:10), Max: 97.6 (26 Nov 2019 05:10)  HR: 71 (26 Nov 2019 05:10) (60 - 73)  BP: 115/68 (26 Nov 2019 05:10) (68/45 - 123/85)  BP(mean): --  RR: 18 (26 Nov 2019 05:10) (18 - 18)  SpO2: 97% (26 Nov 2019 08:19) (97% - 100%)    PHYSICAL EXAM:    General: WDWN  HEENT: NC/AT; PERRL, clear conjunctiva  Neck: supple  Cardiovascular: +S1/S2; RRR  Respiratory: CTA b/l; no W/R/R  Gastrointestinal: soft, NT/ND; +BSx4  Extremities: WWP; 2+ peripheral pulses; no edema   Neurological: AAOx3; no focal deficits    MEDICATIONS:  MEDICATIONS  (STANDING):  ascorbic acid 500 milliGRAM(s) Oral daily  aspirin enteric coated 81 milliGRAM(s) Oral daily  atorvastatin 80 milliGRAM(s) Oral at bedtime  cholecalciferol 1000 Unit(s) Oral daily  cyanocobalamin 1000 MICROGram(s) Oral daily  dextrose 5%. 1000 milliLiter(s) (50 mL/Hr) IV Continuous <Continuous>  dextrose 50% Injectable 12.5 Gram(s) IV Push once  furosemide    Tablet 20 milliGRAM(s) Oral two times a day  metoprolol tartrate 25 milliGRAM(s) Oral two times a day  pantoprazole  Injectable 40 milliGRAM(s) IV Push two times a day    MEDICATIONS  (PRN):  glucagon  Injectable 1 milliGRAM(s) IntraMuscular once PRN Glucose LESS THAN 70 milligrams/deciliter      ALLERGIES:  Allergies    ACE inhibitors (Angioedema)    Intolerances        LABS:                        10.0   5.26  )-----------( 165      ( 26 Nov 2019 07:14 )             31.8               CAPILLARY BLOOD GLUCOSE      POCT Blood Glucose.: 113 mg/dL (25 Nov 2019 08:12)

## 2019-11-26 NOTE — DISCHARGE NOTE PROVIDER - HOSPITAL COURSE
70 yo M with PMHx of CKD (Unknown baseline), HFrEF (On Entresto), HTN, HLD, DM (A1c 6.5 per patient), LGIB secondary to Diverticulitis s/p colon resection (at [facility] about 5 years ago), CAD presenting with Melena.  Patient states that in the evening of 11/20, the patient consumed  peanut brittle and  bright red blood  in the toilet boil mixed with formed stool.  This episode was followed by watery diarrhea, with mixed blood, with a rumbling feeling in his abdomen.     patient clinically and hemodynamically stable on presentation. did not require any transfusions    colonoscopy was done by GI team: no active bleed and no surgical anastomosis bleeding. there is evidence of external and internal hemorrhoids    GI cleared the patient for discahrge. safe to resume aspirin and to follow up with GI in 2 weeks for a repeat colonoscopy

## 2019-12-02 DIAGNOSIS — N18.3 CHRONIC KIDNEY DISEASE, STAGE 3 (MODERATE): ICD-10-CM

## 2019-12-02 DIAGNOSIS — E55.9 VITAMIN D DEFICIENCY, UNSPECIFIED: ICD-10-CM

## 2019-12-02 DIAGNOSIS — N20.0 CALCULUS OF KIDNEY: ICD-10-CM

## 2019-12-02 DIAGNOSIS — I50.22 CHRONIC SYSTOLIC (CONGESTIVE) HEART FAILURE: ICD-10-CM

## 2019-12-02 DIAGNOSIS — E11.22 TYPE 2 DIABETES MELLITUS WITH DIABETIC CHRONIC KIDNEY DISEASE: ICD-10-CM

## 2019-12-02 DIAGNOSIS — K92.2 GASTROINTESTINAL HEMORRHAGE, UNSPECIFIED: ICD-10-CM

## 2019-12-02 DIAGNOSIS — K57.91 DIVERTICULOSIS OF INTESTINE, PART UNSPECIFIED, WITHOUT PERFORATION OR ABSCESS WITH BLEEDING: ICD-10-CM

## 2019-12-02 DIAGNOSIS — I25.10 ATHEROSCLEROTIC HEART DISEASE OF NATIVE CORONARY ARTERY WITHOUT ANGINA PECTORIS: ICD-10-CM

## 2019-12-02 DIAGNOSIS — G47.33 OBSTRUCTIVE SLEEP APNEA (ADULT) (PEDIATRIC): ICD-10-CM

## 2019-12-02 DIAGNOSIS — K64.4 RESIDUAL HEMORRHOIDAL SKIN TAGS: ICD-10-CM

## 2019-12-02 DIAGNOSIS — I13.2 HYPERTENSIVE HEART AND CHRONIC KIDNEY DISEASE WITH HEART FAILURE AND WITH STAGE 5 CHRONIC KIDNEY DISEASE, OR END STAGE RENAL DISEASE: ICD-10-CM

## 2019-12-02 DIAGNOSIS — K64.8 OTHER HEMORRHOIDS: ICD-10-CM

## 2019-12-02 DIAGNOSIS — E66.01 MORBID (SEVERE) OBESITY DUE TO EXCESS CALORIES: ICD-10-CM

## 2019-12-06 ENCOUNTER — APPOINTMENT (OUTPATIENT)
Dept: SURGERY | Facility: CLINIC | Age: 69
End: 2019-12-06
Payer: MEDICARE

## 2019-12-06 VITALS
WEIGHT: 315 LBS | DIASTOLIC BLOOD PRESSURE: 72 MMHG | HEIGHT: 67.5 IN | SYSTOLIC BLOOD PRESSURE: 122 MMHG | BODY MASS INDEX: 48.86 KG/M2

## 2019-12-06 DIAGNOSIS — Z82.49 FAMILY HISTORY OF ISCHEMIC HEART DISEASE AND OTHER DISEASES OF THE CIRCULATORY SYSTEM: ICD-10-CM

## 2019-12-06 PROBLEM — K57.92 DIVERTICULITIS OF INTESTINE, PART UNSPECIFIED, WITHOUT PERFORATION OR ABSCESS WITHOUT BLEEDING: Chronic | Status: ACTIVE | Noted: 2019-11-22

## 2019-12-06 PROBLEM — E11.9 TYPE 2 DIABETES MELLITUS WITHOUT COMPLICATIONS: Chronic | Status: ACTIVE | Noted: 2019-11-22

## 2019-12-06 PROBLEM — I50.9 HEART FAILURE, UNSPECIFIED: Chronic | Status: ACTIVE | Noted: 2019-11-22

## 2019-12-06 PROBLEM — E78.5 HYPERLIPIDEMIA, UNSPECIFIED: Chronic | Status: ACTIVE | Noted: 2019-11-22

## 2019-12-06 PROBLEM — I10 ESSENTIAL (PRIMARY) HYPERTENSION: Chronic | Status: ACTIVE | Noted: 2019-11-22

## 2019-12-06 PROCEDURE — 99214 OFFICE O/P EST MOD 30 MIN: CPT

## 2019-12-06 RX ORDER — ASPIRIN 325 MG/1
TABLET, FILM COATED ORAL DAILY
Refills: 0 | Status: ACTIVE | COMMUNITY

## 2019-12-06 RX ORDER — ASCORBIC ACID 500 MG
TABLET ORAL DAILY
Refills: 0 | Status: ACTIVE | COMMUNITY

## 2019-12-06 RX ORDER — EZETIMIBE 10 MG/1
10 TABLET ORAL DAILY
Refills: 0 | Status: ACTIVE | COMMUNITY

## 2019-12-06 RX ORDER — SACUBITRIL AND VALSARTAN 97; 103 MG/1; MG/1
97-103 TABLET, FILM COATED ORAL TWICE DAILY
Refills: 0 | Status: ACTIVE | COMMUNITY

## 2019-12-06 RX ORDER — METOPROLOL TARTRATE 25 MG/1
25 TABLET, FILM COATED ORAL DAILY
Refills: 0 | Status: ACTIVE | COMMUNITY

## 2019-12-06 RX ORDER — ATORVASTATIN CALCIUM 80 MG/1
80 TABLET, FILM COATED ORAL
Refills: 0 | Status: ACTIVE | COMMUNITY

## 2019-12-06 RX ORDER — FUROSEMIDE 20 MG/1
20 TABLET ORAL WEEKLY
Refills: 0 | Status: ACTIVE | COMMUNITY

## 2019-12-06 RX ORDER — DM/P-EPHED/ACETAMINOPH/DOXYLAM
125 MCG LIQUID (ML) ORAL
Refills: 0 | Status: ACTIVE | COMMUNITY

## 2019-12-06 NOTE — REVIEW OF SYSTEMS
[Shortness Of Breath] : shortness of breath [Joint Pain] : joint pain [Joint Stiffness] : joint stiffness [Negative] : Allergic/Immunologic

## 2019-12-13 ENCOUNTER — APPOINTMENT (OUTPATIENT)
Dept: SURGERY | Facility: CLINIC | Age: 69
End: 2019-12-13
Payer: MEDICARE

## 2019-12-13 VITALS — HEIGHT: 67.5 IN | WEIGHT: 315 LBS | BODY MASS INDEX: 48.86 KG/M2

## 2019-12-13 PROCEDURE — 99212 OFFICE O/P EST SF 10 MIN: CPT

## 2019-12-19 NOTE — HISTORY OF PRESENT ILLNESS
[de-identified] : This is a 69 year old male with a BMI of 51 who presents today for Medical weight loss consultation. Patient states that he has been obese for several years and is seeking help with his weight to improve the quality of his life.  He has multiple medical problems but lately he has developed severe VANN and has difficulty walking more than 1 block. He scored at high risk for SAIDA on the STOP BANG Questionnaire and he was referred to his pulmonologist. He was hospitalized almost 1 month ago and has lost 10 lbs by decreasing his portions.\par \par

## 2019-12-19 NOTE — PLAN
[FreeTextEntry1] : PLAN: Continue to focus on decreasing portions.\par            Start exercise as discussed.\par            RTO in 1 week.\par            Call with concerns.

## 2019-12-19 NOTE — CONSULT LETTER
[Courtesy Letter:] : I had the pleasure of seeing your patient, [unfilled], in my office today. [Please see my note below.] : Please see my note below. [Consult Closing:] : Thank you very much for allowing me to participate in the care of this patient.  If you have any questions, please do not hesitate to contact me. [Sincerely,] : Sincerely, [Dear  ___] : Dear  [unfilled], [FreeTextEntry3] : Jeanine Looney MD, FACS, FASMBS\par Chief, General, Bariatric & Minimally Invasive Surgery\par Director, Quality & Performance Improvement\par Director, General Surgery Residency Program\par Director, Advanced GI MIS Fellowship\par Department of Surgery\par [facility] \par [facility]\par

## 2019-12-19 NOTE — ADDENDUM
[FreeTextEntry1] : Patient seen, examined and reviewed with practitioner.  I agree with the assessment and plan; note amended as appropriate.\par

## 2019-12-19 NOTE — REASON FOR VISIT
[Initial Consult] : an initial consult for [Morbid Obesity (BMI>40)] : morbid obesity (bmi>40) [FreeTextEntry2] : Patient presents for Medical weight loss consultation

## 2019-12-19 NOTE — PHYSICAL EXAM
[Normal] : affect appropriate [de-identified] : Mallampati IV [de-identified] : Soft, nondistended.  Well healed incisions.

## 2019-12-19 NOTE — ASSESSMENT
[FreeTextEntry1] : HAMMAD KING is a 69 year male seen today for Medical weight loss consultation. Patient is very motivated and is willing to make the necessary dietary changes to help him to lose some weight.  He has a gym membership and plans to go to the gym 2 days week for 10-15 minutes and use the stationary bike.\par I made patient aware that our medical weight loss program uses several weight loss options to help patients lose weight and improve their health. Nutritional, lifestyle changes and physical activity are very important components to help patients achieve their weight loss goals. FDA approved medications may be prescribed to qualified patients to help with appetite support.\par

## 2020-01-03 ENCOUNTER — APPOINTMENT (OUTPATIENT)
Dept: SURGERY | Facility: CLINIC | Age: 70
End: 2020-01-03
Payer: MEDICARE

## 2020-01-03 VITALS
DIASTOLIC BLOOD PRESSURE: 82 MMHG | WEIGHT: 315 LBS | SYSTOLIC BLOOD PRESSURE: 128 MMHG | HEART RATE: 86 BPM | HEIGHT: 67.5 IN | BODY MASS INDEX: 48.86 KG/M2

## 2020-01-03 PROCEDURE — 99213 OFFICE O/P EST LOW 20 MIN: CPT

## 2020-01-03 NOTE — DATA REVIEWED
[FreeTextEntry1] : \par Wt. change since last visit: -5 lbs\par %TWL: 2.8 \par TWL: 9.8 lbs\par BMI: 51\par \par Blood work reviewed with patient. He will follow up with Dr. Fleming in 2 weeks who is monitoring his renal function.  He will decrease his vitamin b 12 to 3 days/week and add a multivitamin with iron daily.  Written instructions given to patient.

## 2020-01-03 NOTE — HISTORY OF PRESENT ILLNESS
[de-identified] : He has been following a 1600 kcal/d diet since 12/13/19.  Patient states that it has been an adjustment but his following the dietary plan.

## 2020-01-03 NOTE — ADDENDUM
[FreeTextEntry1] : Patient progressing. Plan of care reviewed with practitioner and I concur with the assessment; note amended as appropriate.\par

## 2020-01-03 NOTE — PLAN
[FreeTextEntry1] : PLAN:  RTO in 3 weeks.\par             Continue with meal planning and increase exercise as tolerated.\par             Call with concerns.

## 2020-01-03 NOTE — ASSESSMENT
[FreeTextEntry1] : HAMMAD KING is a 69 year male seen today for Medical weight loss visit. Patient is doing well with his weight loss goals. He has eliminated fried foods.\par Patient is compliant with dietary guidelines.\par He eats 3 meals/day with an occasional healthful snack. Breakfast - oatmeal with 1 boiled egg and a piece of fruit or eggs with 2 sausage.  Lunch - green salad with turkey, shrimp or chicken.  Dinner - green salad with chicken, fish, hamburger. He has carbohydrate once daily either potato , crackers or a piece of toast. \par Fluid intake is adequate with mainly water and 1 cup of coffee with 3 sugars.  He has cut down his sugar intake to just once daily.\par He is walking daily for 2 blocks back and forth. His breathing has improved and he plans to increase his walking to 3 to 4 blocks next week.\par \par

## 2020-01-03 NOTE — REASON FOR VISIT
[Morbid Obesity (BMI>40)] : morbid obesity (bmi>40) [Follow-Up Visit] : a follow-up visit for [FreeTextEntry2] : Patient presents for Medical weight loss visit

## 2020-01-24 ENCOUNTER — APPOINTMENT (OUTPATIENT)
Dept: SURGERY | Facility: CLINIC | Age: 70
End: 2020-01-24
Payer: MEDICARE

## 2020-01-24 VITALS
SYSTOLIC BLOOD PRESSURE: 130 MMHG | DIASTOLIC BLOOD PRESSURE: 84 MMHG | WEIGHT: 315 LBS | BODY MASS INDEX: 48.86 KG/M2 | HEART RATE: 85 BPM | HEIGHT: 67.5 IN

## 2020-01-24 DIAGNOSIS — R06.09 OTHER FORMS OF DYSPNEA: ICD-10-CM

## 2020-01-24 PROCEDURE — 99212 OFFICE O/P EST SF 10 MIN: CPT

## 2020-02-21 ENCOUNTER — APPOINTMENT (OUTPATIENT)
Dept: SURGERY | Facility: CLINIC | Age: 70
End: 2020-02-21
Payer: MEDICARE

## 2020-02-21 VITALS
SYSTOLIC BLOOD PRESSURE: 132 MMHG | HEART RATE: 83 BPM | BODY MASS INDEX: 48.86 KG/M2 | WEIGHT: 315 LBS | DIASTOLIC BLOOD PRESSURE: 80 MMHG | HEIGHT: 67.5 IN

## 2020-02-21 DIAGNOSIS — E66.01 MORBID (SEVERE) OBESITY DUE TO EXCESS CALORIES: ICD-10-CM

## 2020-02-21 DIAGNOSIS — E78.5 HYPERLIPIDEMIA, UNSPECIFIED: ICD-10-CM

## 2020-02-21 DIAGNOSIS — I87.2 VENOUS INSUFFICIENCY (CHRONIC) (PERIPHERAL): ICD-10-CM

## 2020-02-21 DIAGNOSIS — M25.562 PAIN IN RIGHT KNEE: ICD-10-CM

## 2020-02-21 DIAGNOSIS — M25.561 PAIN IN RIGHT KNEE: ICD-10-CM

## 2020-02-21 DIAGNOSIS — M79.89 OTHER SPECIFIED SOFT TISSUE DISORDERS: ICD-10-CM

## 2020-02-21 DIAGNOSIS — I10 ESSENTIAL (PRIMARY) HYPERTENSION: ICD-10-CM

## 2020-02-21 PROCEDURE — 99213 OFFICE O/P EST LOW 20 MIN: CPT

## 2020-02-21 NOTE — DATA REVIEWED
[FreeTextEntry1] : \par Wt. change since last visit: -4 lbs\par %TWL: 4.9\par TWL: 16.8 lbs\par BMI: 50\par \par

## 2020-02-21 NOTE — PLAN
[FreeTextEntry1] : PLAN:  RTO in 3 weeks.\par             Continue with diet and exercise.\par             Call with concerns.

## 2020-02-21 NOTE — ADDENDUM
[FreeTextEntry1] : Patient with appropriate weight loss for MWL program.  Plan of care reviewed with practitioner and I concur with the assessment; note amended as appropriate.\par

## 2020-02-21 NOTE — REASON FOR VISIT
[Morbid Obesity (BMI>40)] : morbid obesity (bmi>40) [Follow-Up Visit] : a follow-up visit for [FreeTextEntry2] : Patient presents for Medical weight loss visit.

## 2020-02-21 NOTE — ASSESSMENT
[FreeTextEntry1] : HAMMAD KING is a 69 year male seen today for Medical weight loss visit.  Patient states that he feels much better. He treats himself once per month with a small serving of ice cream and fried chicken.\par Patient is compliant with dietary guidelines and he continues to plan his meal. \par He eats 3 meals/day.  Breakfast - Special K cereal with 1 % milk. Lunch - Greek yogurt with an apple.  Dinner - green salad with berries or ramona greens and a piece of chicken or pork chops. Snack - fruits or a handful of potato chips.\par Fluid intake is adequate with mainly water, teas and coffee.  He has eliminated sodas.\par He walks 6 blocks daily which takes him about 10-15 minutes.  He plans to go back to the [facility]  next month.\par \par

## 2020-02-21 NOTE — HISTORY OF PRESENT ILLNESS
[de-identified] : He has been following a 1600 kcal/d diet since 12/13/19. Patient states that he has benefited from the program and he continues to make several dietary changes.\par

## 2020-03-20 ENCOUNTER — APPOINTMENT (OUTPATIENT)
Dept: SURGERY | Facility: CLINIC | Age: 70
End: 2020-03-20

## 2021-01-01 ENCOUNTER — INPATIENT (INPATIENT)
Facility: HOSPITAL | Age: 71
LOS: 17 days | End: 2021-04-06
Attending: INTERNAL MEDICINE | Admitting: INTERNAL MEDICINE
Payer: MEDICARE

## 2021-01-01 VITALS
SYSTOLIC BLOOD PRESSURE: 136 MMHG | HEIGHT: 70 IN | RESPIRATION RATE: 20 BRPM | DIASTOLIC BLOOD PRESSURE: 84 MMHG | TEMPERATURE: 97 F | HEART RATE: 93 BPM | OXYGEN SATURATION: 92 %

## 2021-01-01 VITALS
RESPIRATION RATE: 30 BRPM | HEART RATE: 86 BPM | SYSTOLIC BLOOD PRESSURE: 121 MMHG | DIASTOLIC BLOOD PRESSURE: 81 MMHG | TEMPERATURE: 98 F | OXYGEN SATURATION: 98 %

## 2021-01-01 DIAGNOSIS — N17.9 ACUTE KIDNEY FAILURE, UNSPECIFIED: ICD-10-CM

## 2021-01-01 DIAGNOSIS — Z90.49 ACQUIRED ABSENCE OF OTHER SPECIFIED PARTS OF DIGESTIVE TRACT: Chronic | ICD-10-CM

## 2021-01-01 DIAGNOSIS — R06.00 DYSPNEA, UNSPECIFIED: ICD-10-CM

## 2021-01-01 DIAGNOSIS — U07.1 COVID-19: ICD-10-CM

## 2021-01-01 DIAGNOSIS — I50.9 HEART FAILURE, UNSPECIFIED: ICD-10-CM

## 2021-01-01 DIAGNOSIS — Z51.5 ENCOUNTER FOR PALLIATIVE CARE: ICD-10-CM

## 2021-01-01 LAB
A1C WITH ESTIMATED AVERAGE GLUCOSE RESULT: 6.9 % — HIGH (ref 4–5.6)
ACANTHOCYTES BLD QL SMEAR: SIGNIFICANT CHANGE UP
ACANTHOCYTES BLD QL SMEAR: SLIGHT — SIGNIFICANT CHANGE UP
ALBUMIN SERPL ELPH-MCNC: 2.9 G/DL — LOW (ref 3.5–5.2)
ALBUMIN SERPL ELPH-MCNC: 3 G/DL — LOW (ref 3.5–5.2)
ALBUMIN SERPL ELPH-MCNC: 3.1 G/DL — LOW (ref 3.5–5.2)
ALBUMIN SERPL ELPH-MCNC: 3.1 G/DL — LOW (ref 3.5–5.2)
ALBUMIN SERPL ELPH-MCNC: 3.2 G/DL — LOW (ref 3.5–5.2)
ALBUMIN SERPL ELPH-MCNC: 3.3 G/DL — LOW (ref 3.5–5.2)
ALBUMIN SERPL ELPH-MCNC: 3.4 G/DL — LOW (ref 3.5–5.2)
ALBUMIN SERPL ELPH-MCNC: 3.5 G/DL — SIGNIFICANT CHANGE UP (ref 3.5–5.2)
ALBUMIN SERPL ELPH-MCNC: 3.6 G/DL — SIGNIFICANT CHANGE UP (ref 3.5–5.2)
ALP SERPL-CCNC: 117 U/L — HIGH (ref 30–115)
ALP SERPL-CCNC: 151 U/L — HIGH (ref 30–115)
ALP SERPL-CCNC: 158 U/L — HIGH (ref 30–115)
ALP SERPL-CCNC: 163 U/L — HIGH (ref 30–115)
ALP SERPL-CCNC: 170 U/L — HIGH (ref 30–115)
ALP SERPL-CCNC: 33 U/L — SIGNIFICANT CHANGE UP (ref 30–115)
ALP SERPL-CCNC: 33 U/L — SIGNIFICANT CHANGE UP (ref 30–115)
ALP SERPL-CCNC: 34 U/L — SIGNIFICANT CHANGE UP (ref 30–115)
ALP SERPL-CCNC: 36 U/L — SIGNIFICANT CHANGE UP (ref 30–115)
ALP SERPL-CCNC: 40 U/L — SIGNIFICANT CHANGE UP (ref 30–115)
ALP SERPL-CCNC: 44 U/L — SIGNIFICANT CHANGE UP (ref 30–115)
ALP SERPL-CCNC: 47 U/L — SIGNIFICANT CHANGE UP (ref 30–115)
ALP SERPL-CCNC: 48 U/L — SIGNIFICANT CHANGE UP (ref 30–115)
ALP SERPL-CCNC: 53 U/L — SIGNIFICANT CHANGE UP (ref 30–115)
ALP SERPL-CCNC: 64 U/L — SIGNIFICANT CHANGE UP (ref 30–115)
ALP SERPL-CCNC: 69 U/L — SIGNIFICANT CHANGE UP (ref 30–115)
ALP SERPL-CCNC: 73 U/L — SIGNIFICANT CHANGE UP (ref 30–115)
ALP SERPL-CCNC: 76 U/L — SIGNIFICANT CHANGE UP (ref 30–115)
ALP SERPL-CCNC: 77 U/L — SIGNIFICANT CHANGE UP (ref 30–115)
ALP SERPL-CCNC: 79 U/L — SIGNIFICANT CHANGE UP (ref 30–115)
ALP SERPL-CCNC: 81 U/L — SIGNIFICANT CHANGE UP (ref 30–115)
ALP SERPL-CCNC: 83 U/L — SIGNIFICANT CHANGE UP (ref 30–115)
ALP SERPL-CCNC: 85 U/L — SIGNIFICANT CHANGE UP (ref 30–115)
ALP SERPL-CCNC: 86 U/L — SIGNIFICANT CHANGE UP (ref 30–115)
ALP SERPL-CCNC: 92 U/L — SIGNIFICANT CHANGE UP (ref 30–115)
ALT FLD-CCNC: 100 U/L — HIGH (ref 0–41)
ALT FLD-CCNC: 101 U/L — HIGH (ref 0–41)
ALT FLD-CCNC: 108 U/L — HIGH (ref 0–41)
ALT FLD-CCNC: 111 U/L — HIGH (ref 0–41)
ALT FLD-CCNC: 113 U/L — HIGH (ref 0–41)
ALT FLD-CCNC: 36 U/L — SIGNIFICANT CHANGE UP (ref 0–41)
ALT FLD-CCNC: 37 U/L — SIGNIFICANT CHANGE UP (ref 0–41)
ALT FLD-CCNC: 40 U/L — SIGNIFICANT CHANGE UP (ref 0–41)
ALT FLD-CCNC: 42 U/L — HIGH (ref 0–41)
ALT FLD-CCNC: 50 U/L — HIGH (ref 0–41)
ALT FLD-CCNC: 50 U/L — HIGH (ref 0–41)
ALT FLD-CCNC: 54 U/L — HIGH (ref 0–41)
ALT FLD-CCNC: 54 U/L — HIGH (ref 0–41)
ALT FLD-CCNC: 55 U/L — HIGH (ref 0–41)
ALT FLD-CCNC: 56 U/L — HIGH (ref 0–41)
ALT FLD-CCNC: 68 U/L — HIGH (ref 0–41)
ALT FLD-CCNC: 68 U/L — HIGH (ref 0–41)
ALT FLD-CCNC: 73 U/L — HIGH (ref 0–41)
ALT FLD-CCNC: 79 U/L — HIGH (ref 0–41)
ALT FLD-CCNC: 79 U/L — HIGH (ref 0–41)
ALT FLD-CCNC: 82 U/L — HIGH (ref 0–41)
ALT FLD-CCNC: 92 U/L — HIGH (ref 0–41)
ALT FLD-CCNC: 93 U/L — HIGH (ref 0–41)
ALT FLD-CCNC: 94 U/L — HIGH (ref 0–41)
ALT FLD-CCNC: 97 U/L — HIGH (ref 0–41)
ANION GAP SERPL CALC-SCNC: 10 MMOL/L — SIGNIFICANT CHANGE UP (ref 7–14)
ANION GAP SERPL CALC-SCNC: 11 MMOL/L — SIGNIFICANT CHANGE UP (ref 7–14)
ANION GAP SERPL CALC-SCNC: 11 MMOL/L — SIGNIFICANT CHANGE UP (ref 7–14)
ANION GAP SERPL CALC-SCNC: 12 MMOL/L — SIGNIFICANT CHANGE UP (ref 7–14)
ANION GAP SERPL CALC-SCNC: 13 MMOL/L — SIGNIFICANT CHANGE UP (ref 7–14)
ANION GAP SERPL CALC-SCNC: 13 MMOL/L — SIGNIFICANT CHANGE UP (ref 7–14)
ANION GAP SERPL CALC-SCNC: 14 MMOL/L — SIGNIFICANT CHANGE UP (ref 7–14)
ANION GAP SERPL CALC-SCNC: 15 MMOL/L — HIGH (ref 7–14)
ANION GAP SERPL CALC-SCNC: 16 MMOL/L — HIGH (ref 7–14)
ANION GAP SERPL CALC-SCNC: 18 MMOL/L — HIGH (ref 7–14)
ANION GAP SERPL CALC-SCNC: 19 MMOL/L — HIGH (ref 7–14)
ANION GAP SERPL CALC-SCNC: 19 MMOL/L — HIGH (ref 7–14)
ANION GAP SERPL CALC-SCNC: 9 MMOL/L — SIGNIFICANT CHANGE UP (ref 7–14)
ANISOCYTOSIS BLD QL: SLIGHT — SIGNIFICANT CHANGE UP
ANISOCYTOSIS BLD QL: SLIGHT — SIGNIFICANT CHANGE UP
APPEARANCE UR: CLEAR — SIGNIFICANT CHANGE UP
APTT BLD: 24.6 SEC — LOW (ref 27–39.2)
APTT BLD: 33.1 SEC — SIGNIFICANT CHANGE UP (ref 27–39.2)
APTT BLD: 33.9 SEC — SIGNIFICANT CHANGE UP (ref 27–39.2)
AST SERPL-CCNC: 32 U/L — SIGNIFICANT CHANGE UP (ref 0–41)
AST SERPL-CCNC: 32 U/L — SIGNIFICANT CHANGE UP (ref 0–41)
AST SERPL-CCNC: 33 U/L — SIGNIFICANT CHANGE UP (ref 0–41)
AST SERPL-CCNC: 38 U/L — SIGNIFICANT CHANGE UP (ref 0–41)
AST SERPL-CCNC: 40 U/L — SIGNIFICANT CHANGE UP (ref 0–41)
AST SERPL-CCNC: 54 U/L — HIGH (ref 0–41)
AST SERPL-CCNC: 58 U/L — HIGH (ref 0–41)
AST SERPL-CCNC: 58 U/L — HIGH (ref 0–41)
AST SERPL-CCNC: 60 U/L — HIGH (ref 0–41)
AST SERPL-CCNC: 60 U/L — HIGH (ref 0–41)
AST SERPL-CCNC: 61 U/L — HIGH (ref 0–41)
AST SERPL-CCNC: 62 U/L — HIGH (ref 0–41)
AST SERPL-CCNC: 63 U/L — HIGH (ref 0–41)
AST SERPL-CCNC: 68 U/L — HIGH (ref 0–41)
AST SERPL-CCNC: 70 U/L — HIGH (ref 0–41)
AST SERPL-CCNC: 71 U/L — HIGH (ref 0–41)
AST SERPL-CCNC: 73 U/L — HIGH (ref 0–41)
AST SERPL-CCNC: 74 U/L — HIGH (ref 0–41)
AST SERPL-CCNC: 75 U/L — HIGH (ref 0–41)
AST SERPL-CCNC: 75 U/L — HIGH (ref 0–41)
AST SERPL-CCNC: 79 U/L — HIGH (ref 0–41)
AST SERPL-CCNC: 81 U/L — HIGH (ref 0–41)
AST SERPL-CCNC: 85 U/L — HIGH (ref 0–41)
BACTERIA # UR AUTO: NEGATIVE — SIGNIFICANT CHANGE UP
BASE EXCESS BLDV CALC-SCNC: -13.5 MMOL/L — LOW (ref -2–2)
BASOPHILS # BLD AUTO: 0 K/UL — SIGNIFICANT CHANGE UP (ref 0–0.2)
BASOPHILS # BLD AUTO: 0.01 K/UL — SIGNIFICANT CHANGE UP (ref 0–0.2)
BASOPHILS # BLD AUTO: 0.02 K/UL — SIGNIFICANT CHANGE UP (ref 0–0.2)
BASOPHILS # BLD AUTO: 0.03 K/UL — SIGNIFICANT CHANGE UP (ref 0–0.2)
BASOPHILS # BLD AUTO: 0.04 K/UL — SIGNIFICANT CHANGE UP (ref 0–0.2)
BASOPHILS # BLD AUTO: 0.05 K/UL — SIGNIFICANT CHANGE UP (ref 0–0.2)
BASOPHILS # BLD AUTO: 0.05 K/UL — SIGNIFICANT CHANGE UP (ref 0–0.2)
BASOPHILS # BLD AUTO: 0.06 K/UL — SIGNIFICANT CHANGE UP (ref 0–0.2)
BASOPHILS # BLD AUTO: 0.07 K/UL — SIGNIFICANT CHANGE UP (ref 0–0.2)
BASOPHILS NFR BLD AUTO: 0 % — SIGNIFICANT CHANGE UP (ref 0–1)
BASOPHILS NFR BLD AUTO: 0.2 % — SIGNIFICANT CHANGE UP (ref 0–1)
BASOPHILS NFR BLD AUTO: 0.3 % — SIGNIFICANT CHANGE UP (ref 0–1)
BASOPHILS NFR BLD AUTO: 0.3 % — SIGNIFICANT CHANGE UP (ref 0–1)
BASOPHILS NFR BLD AUTO: 0.5 % — SIGNIFICANT CHANGE UP (ref 0–1)
BASOPHILS NFR BLD AUTO: 0.6 % — SIGNIFICANT CHANGE UP (ref 0–1)
BASOPHILS NFR BLD AUTO: 0.9 % — SIGNIFICANT CHANGE UP (ref 0–1)
BASOPHILS NFR BLD AUTO: 0.9 % — SIGNIFICANT CHANGE UP (ref 0–1)
BILIRUB DIRECT SERPL-MCNC: 0.2 MG/DL — SIGNIFICANT CHANGE UP (ref 0–0.2)
BILIRUB DIRECT SERPL-MCNC: 0.3 MG/DL — HIGH (ref 0–0.2)
BILIRUB INDIRECT FLD-MCNC: 0.1 MG/DL — LOW (ref 0.2–1.2)
BILIRUB INDIRECT FLD-MCNC: 0.2 MG/DL — SIGNIFICANT CHANGE UP (ref 0.2–1.2)
BILIRUB INDIRECT FLD-MCNC: 0.2 MG/DL — SIGNIFICANT CHANGE UP (ref 0.2–1.2)
BILIRUB INDIRECT FLD-MCNC: 0.5 MG/DL — SIGNIFICANT CHANGE UP (ref 0.2–1.2)
BILIRUB INDIRECT FLD-MCNC: 0.7 MG/DL — SIGNIFICANT CHANGE UP (ref 0.2–1.2)
BILIRUB SERPL-MCNC: 0.3 MG/DL — SIGNIFICANT CHANGE UP (ref 0.2–1.2)
BILIRUB SERPL-MCNC: 0.4 MG/DL — SIGNIFICANT CHANGE UP (ref 0.2–1.2)
BILIRUB SERPL-MCNC: 0.5 MG/DL — SIGNIFICANT CHANGE UP (ref 0.2–1.2)
BILIRUB SERPL-MCNC: 0.6 MG/DL — SIGNIFICANT CHANGE UP (ref 0.2–1.2)
BILIRUB SERPL-MCNC: 0.7 MG/DL — SIGNIFICANT CHANGE UP (ref 0.2–1.2)
BILIRUB SERPL-MCNC: 0.8 MG/DL — SIGNIFICANT CHANGE UP (ref 0.2–1.2)
BILIRUB SERPL-MCNC: 0.9 MG/DL — SIGNIFICANT CHANGE UP (ref 0.2–1.2)
BILIRUB SERPL-MCNC: 1 MG/DL — SIGNIFICANT CHANGE UP (ref 0.2–1.2)
BILIRUB SERPL-MCNC: 1 MG/DL — SIGNIFICANT CHANGE UP (ref 0.2–1.2)
BILIRUB SERPL-MCNC: 1.1 MG/DL — SIGNIFICANT CHANGE UP (ref 0.2–1.2)
BILIRUB SERPL-MCNC: 1.2 MG/DL — SIGNIFICANT CHANGE UP (ref 0.2–1.2)
BILIRUB SERPL-MCNC: 1.3 MG/DL — HIGH (ref 0.2–1.2)
BILIRUB UR-MCNC: NEGATIVE — SIGNIFICANT CHANGE UP
BUN SERPL-MCNC: 100 MG/DL — CRITICAL HIGH (ref 10–20)
BUN SERPL-MCNC: 104 MG/DL — CRITICAL HIGH (ref 10–20)
BUN SERPL-MCNC: 107 MG/DL — CRITICAL HIGH (ref 10–20)
BUN SERPL-MCNC: 109 MG/DL — CRITICAL HIGH (ref 10–20)
BUN SERPL-MCNC: 111 MG/DL — CRITICAL HIGH (ref 10–20)
BUN SERPL-MCNC: 119 MG/DL — CRITICAL HIGH (ref 10–20)
BUN SERPL-MCNC: 120 MG/DL — CRITICAL HIGH (ref 10–20)
BUN SERPL-MCNC: 123 MG/DL — CRITICAL HIGH (ref 10–20)
BUN SERPL-MCNC: 125 MG/DL — CRITICAL HIGH (ref 10–20)
BUN SERPL-MCNC: 75 MG/DL — CRITICAL HIGH (ref 10–20)
BUN SERPL-MCNC: 75 MG/DL — CRITICAL HIGH (ref 10–20)
BUN SERPL-MCNC: 79 MG/DL — CRITICAL HIGH (ref 10–20)
BUN SERPL-MCNC: 84 MG/DL — CRITICAL HIGH (ref 10–20)
BUN SERPL-MCNC: 85 MG/DL — CRITICAL HIGH (ref 10–20)
BUN SERPL-MCNC: 86 MG/DL — CRITICAL HIGH (ref 10–20)
BUN SERPL-MCNC: 86 MG/DL — CRITICAL HIGH (ref 10–20)
BUN SERPL-MCNC: 87 MG/DL — CRITICAL HIGH (ref 10–20)
BUN SERPL-MCNC: 88 MG/DL — CRITICAL HIGH (ref 10–20)
BUN SERPL-MCNC: 89 MG/DL — CRITICAL HIGH (ref 10–20)
BUN SERPL-MCNC: 89 MG/DL — CRITICAL HIGH (ref 10–20)
BUN SERPL-MCNC: 94 MG/DL — CRITICAL HIGH (ref 10–20)
BUN SERPL-MCNC: 97 MG/DL — CRITICAL HIGH (ref 10–20)
BURR CELLS BLD QL SMEAR: PRESENT — SIGNIFICANT CHANGE UP
CA-I SERPL-SCNC: 1.22 MMOL/L — SIGNIFICANT CHANGE UP (ref 1.12–1.3)
CALCIUM SERPL-MCNC: 7.1 MG/DL — LOW (ref 8.5–10.1)
CALCIUM SERPL-MCNC: 7.2 MG/DL — LOW (ref 8.5–10.1)
CALCIUM SERPL-MCNC: 7.3 MG/DL — LOW (ref 8.5–10.1)
CALCIUM SERPL-MCNC: 7.5 MG/DL — LOW (ref 8.5–10.1)
CALCIUM SERPL-MCNC: 7.5 MG/DL — LOW (ref 8.5–10.1)
CALCIUM SERPL-MCNC: 7.6 MG/DL — LOW (ref 8.5–10.1)
CALCIUM SERPL-MCNC: 7.7 MG/DL — LOW (ref 8.5–10.1)
CALCIUM SERPL-MCNC: 7.8 MG/DL — LOW (ref 8.5–10.1)
CALCIUM SERPL-MCNC: 7.8 MG/DL — LOW (ref 8.5–10.1)
CALCIUM SERPL-MCNC: 8.2 MG/DL — LOW (ref 8.5–10.1)
CALCIUM SERPL-MCNC: 8.3 MG/DL — LOW (ref 8.5–10.1)
CALCIUM SERPL-MCNC: 8.4 MG/DL — LOW (ref 8.5–10.1)
CALCIUM SERPL-MCNC: 8.4 MG/DL — LOW (ref 8.5–10.1)
CALCIUM SERPL-MCNC: 8.5 MG/DL — SIGNIFICANT CHANGE UP (ref 8.5–10.1)
CALCIUM SERPL-MCNC: 8.8 MG/DL — SIGNIFICANT CHANGE UP (ref 8.5–10.1)
CALCIUM SERPL-MCNC: 8.8 MG/DL — SIGNIFICANT CHANGE UP (ref 8.5–10.1)
CALCIUM SERPL-MCNC: 8.9 MG/DL — SIGNIFICANT CHANGE UP (ref 8.5–10.1)
CALCIUM SERPL-MCNC: 9 MG/DL — SIGNIFICANT CHANGE UP (ref 8.5–10.1)
CALCIUM SERPL-MCNC: 9 MG/DL — SIGNIFICANT CHANGE UP (ref 8.5–10.1)
CALCIUM SERPL-MCNC: 9.1 MG/DL — SIGNIFICANT CHANGE UP (ref 8.5–10.1)
CALCIUM SERPL-MCNC: 9.2 MG/DL — SIGNIFICANT CHANGE UP (ref 8.5–10.1)
CALCIUM SERPL-MCNC: 9.2 MG/DL — SIGNIFICANT CHANGE UP (ref 8.5–10.1)
CALCIUM SERPL-MCNC: 9.4 MG/DL — SIGNIFICANT CHANGE UP (ref 8.5–10.1)
CALCIUM SERPL-MCNC: 9.4 MG/DL — SIGNIFICANT CHANGE UP (ref 8.5–10.1)
CALCIUM SERPL-MCNC: 9.5 MG/DL — SIGNIFICANT CHANGE UP (ref 8.5–10.1)
CALCIUM SERPL-MCNC: 9.5 MG/DL — SIGNIFICANT CHANGE UP (ref 8.5–10.1)
CHLORIDE SERPL-SCNC: 101 MMOL/L — SIGNIFICANT CHANGE UP (ref 98–110)
CHLORIDE SERPL-SCNC: 102 MMOL/L — SIGNIFICANT CHANGE UP (ref 98–110)
CHLORIDE SERPL-SCNC: 104 MMOL/L — SIGNIFICANT CHANGE UP (ref 98–110)
CHLORIDE SERPL-SCNC: 104 MMOL/L — SIGNIFICANT CHANGE UP (ref 98–110)
CHLORIDE SERPL-SCNC: 105 MMOL/L — SIGNIFICANT CHANGE UP (ref 98–110)
CHLORIDE SERPL-SCNC: 105 MMOL/L — SIGNIFICANT CHANGE UP (ref 98–110)
CHLORIDE SERPL-SCNC: 106 MMOL/L — SIGNIFICANT CHANGE UP (ref 98–110)
CHLORIDE SERPL-SCNC: 107 MMOL/L — SIGNIFICANT CHANGE UP (ref 98–110)
CHLORIDE SERPL-SCNC: 108 MMOL/L — SIGNIFICANT CHANGE UP (ref 98–110)
CHLORIDE SERPL-SCNC: 110 MMOL/L — SIGNIFICANT CHANGE UP (ref 98–110)
CHLORIDE SERPL-SCNC: 111 MMOL/L — HIGH (ref 98–110)
CHLORIDE SERPL-SCNC: 112 MMOL/L — HIGH (ref 98–110)
CHLORIDE SERPL-SCNC: 114 MMOL/L — HIGH (ref 98–110)
CHLORIDE SERPL-SCNC: 115 MMOL/L — HIGH (ref 98–110)
CHOLEST SERPL-MCNC: 87 MG/DL — SIGNIFICANT CHANGE UP
CO2 SERPL-SCNC: 12 MMOL/L — LOW (ref 17–32)
CO2 SERPL-SCNC: 13 MMOL/L — LOW (ref 17–32)
CO2 SERPL-SCNC: 13 MMOL/L — LOW (ref 17–32)
CO2 SERPL-SCNC: 14 MMOL/L — LOW (ref 17–32)
CO2 SERPL-SCNC: 14 MMOL/L — LOW (ref 17–32)
CO2 SERPL-SCNC: 15 MMOL/L — LOW (ref 17–32)
CO2 SERPL-SCNC: 18 MMOL/L — SIGNIFICANT CHANGE UP (ref 17–32)
CO2 SERPL-SCNC: 18 MMOL/L — SIGNIFICANT CHANGE UP (ref 17–32)
CO2 SERPL-SCNC: 19 MMOL/L — SIGNIFICANT CHANGE UP (ref 17–32)
CO2 SERPL-SCNC: 22 MMOL/L — SIGNIFICANT CHANGE UP (ref 17–32)
CO2 SERPL-SCNC: 22 MMOL/L — SIGNIFICANT CHANGE UP (ref 17–32)
CO2 SERPL-SCNC: 23 MMOL/L — SIGNIFICANT CHANGE UP (ref 17–32)
CO2 SERPL-SCNC: 24 MMOL/L — SIGNIFICANT CHANGE UP (ref 17–32)
CO2 SERPL-SCNC: 25 MMOL/L — SIGNIFICANT CHANGE UP (ref 17–32)
COLOR SPEC: YELLOW — SIGNIFICANT CHANGE UP
CREAT ?TM UR-MCNC: 110 MG/DL — SIGNIFICANT CHANGE UP
CREAT SERPL-MCNC: 1.4 MG/DL — SIGNIFICANT CHANGE UP (ref 0.7–1.5)
CREAT SERPL-MCNC: 1.5 MG/DL — SIGNIFICANT CHANGE UP (ref 0.7–1.5)
CREAT SERPL-MCNC: 1.5 MG/DL — SIGNIFICANT CHANGE UP (ref 0.7–1.5)
CREAT SERPL-MCNC: 1.6 MG/DL — HIGH (ref 0.7–1.5)
CREAT SERPL-MCNC: 1.7 MG/DL — HIGH (ref 0.7–1.5)
CREAT SERPL-MCNC: 1.8 MG/DL — HIGH (ref 0.7–1.5)
CREAT SERPL-MCNC: 1.9 MG/DL — HIGH (ref 0.7–1.5)
CREAT SERPL-MCNC: 2.1 MG/DL — HIGH (ref 0.7–1.5)
CREAT SERPL-MCNC: 2.3 MG/DL — HIGH (ref 0.7–1.5)
CREAT SERPL-MCNC: 2.4 MG/DL — HIGH (ref 0.7–1.5)
CREAT SERPL-MCNC: 2.8 MG/DL — HIGH (ref 0.7–1.5)
CREAT SERPL-MCNC: 2.9 MG/DL — HIGH (ref 0.7–1.5)
CREAT SERPL-MCNC: 3 MG/DL — HIGH (ref 0.7–1.5)
CREAT SERPL-MCNC: 3.3 MG/DL — HIGH (ref 0.7–1.5)
CREAT SERPL-MCNC: 3.4 MG/DL — HIGH (ref 0.7–1.5)
CREAT SERPL-MCNC: 3.5 MG/DL — HIGH (ref 0.7–1.5)
CREAT SERPL-MCNC: 3.5 MG/DL — HIGH (ref 0.7–1.5)
CRP SERPL-MCNC: 174 MG/L — HIGH
CRP SERPL-MCNC: 19 MG/L — HIGH
CRP SERPL-MCNC: 30 MG/L — HIGH
CRP SERPL-MCNC: 43 MG/L — HIGH
CULTURE RESULTS: SIGNIFICANT CHANGE UP
D DIMER BLD IA.RAPID-MCNC: 440 NG/ML DDU — HIGH (ref 0–230)
D DIMER BLD IA.RAPID-MCNC: 562 NG/ML DDU — HIGH (ref 0–230)
D DIMER BLD IA.RAPID-MCNC: 633 NG/ML DDU — HIGH (ref 0–230)
D DIMER BLD IA.RAPID-MCNC: 787 NG/ML DDU — HIGH (ref 0–230)
D DIMER BLD IA.RAPID-MCNC: HIGH NG/ML DDU (ref 0–230)
DIFF PNL FLD: ABNORMAL
ELLIPTOCYTES BLD QL SMEAR: SLIGHT — SIGNIFICANT CHANGE UP
EOSINOPHIL # BLD AUTO: 0 K/UL — SIGNIFICANT CHANGE UP (ref 0–0.7)
EOSINOPHIL # BLD AUTO: 0.01 K/UL — SIGNIFICANT CHANGE UP (ref 0–0.7)
EOSINOPHIL # BLD AUTO: 0.02 K/UL — SIGNIFICANT CHANGE UP (ref 0–0.7)
EOSINOPHIL # BLD AUTO: 0.02 K/UL — SIGNIFICANT CHANGE UP (ref 0–0.7)
EOSINOPHIL NFR BLD AUTO: 0 % — SIGNIFICANT CHANGE UP (ref 0–8)
EOSINOPHIL NFR BLD AUTO: 0.1 % — SIGNIFICANT CHANGE UP (ref 0–8)
EOSINOPHIL NFR BLD AUTO: 0.2 % — SIGNIFICANT CHANGE UP (ref 0–8)
EOSINOPHIL NFR BLD AUTO: 0.2 % — SIGNIFICANT CHANGE UP (ref 0–8)
EPI CELLS # UR: 1 /HPF — SIGNIFICANT CHANGE UP (ref 0–5)
ESTIMATED AVERAGE GLUCOSE: 151 MG/DL — HIGH (ref 68–114)
FERRITIN SERPL-MCNC: 1284 NG/ML — HIGH (ref 30–400)
FERRITIN SERPL-MCNC: 1365 NG/ML — HIGH (ref 30–400)
FERRITIN SERPL-MCNC: 1381 NG/ML — HIGH (ref 30–400)
FERRITIN SERPL-MCNC: 1478 NG/ML — HIGH (ref 30–400)
GAS PNL BLDA: SIGNIFICANT CHANGE UP
GAS PNL BLDA: SIGNIFICANT CHANGE UP
GAS PNL BLDV: 136 MMOL/L — SIGNIFICANT CHANGE UP (ref 136–145)
GAS PNL BLDV: SIGNIFICANT CHANGE UP
GIANT PLATELETS BLD QL SMEAR: PRESENT — SIGNIFICANT CHANGE UP
GIANT PLATELETS BLD QL SMEAR: PRESENT — SIGNIFICANT CHANGE UP
GLUCOSE BLDC GLUCOMTR-MCNC: 101 MG/DL — HIGH (ref 70–99)
GLUCOSE BLDC GLUCOMTR-MCNC: 103 MG/DL — HIGH (ref 70–99)
GLUCOSE BLDC GLUCOMTR-MCNC: 106 MG/DL — HIGH (ref 70–99)
GLUCOSE BLDC GLUCOMTR-MCNC: 111 MG/DL — HIGH (ref 70–99)
GLUCOSE BLDC GLUCOMTR-MCNC: 111 MG/DL — HIGH (ref 70–99)
GLUCOSE BLDC GLUCOMTR-MCNC: 116 MG/DL — HIGH (ref 70–99)
GLUCOSE BLDC GLUCOMTR-MCNC: 116 MG/DL — HIGH (ref 70–99)
GLUCOSE BLDC GLUCOMTR-MCNC: 117 MG/DL — HIGH (ref 70–99)
GLUCOSE BLDC GLUCOMTR-MCNC: 118 MG/DL — HIGH (ref 70–99)
GLUCOSE BLDC GLUCOMTR-MCNC: 118 MG/DL — HIGH (ref 70–99)
GLUCOSE BLDC GLUCOMTR-MCNC: 119 MG/DL — HIGH (ref 70–99)
GLUCOSE BLDC GLUCOMTR-MCNC: 122 MG/DL — HIGH (ref 70–99)
GLUCOSE BLDC GLUCOMTR-MCNC: 130 MG/DL — HIGH (ref 70–99)
GLUCOSE BLDC GLUCOMTR-MCNC: 130 MG/DL — HIGH (ref 70–99)
GLUCOSE BLDC GLUCOMTR-MCNC: 131 MG/DL — HIGH (ref 70–99)
GLUCOSE BLDC GLUCOMTR-MCNC: 132 MG/DL — HIGH (ref 70–99)
GLUCOSE BLDC GLUCOMTR-MCNC: 134 MG/DL — HIGH (ref 70–99)
GLUCOSE BLDC GLUCOMTR-MCNC: 134 MG/DL — HIGH (ref 70–99)
GLUCOSE BLDC GLUCOMTR-MCNC: 136 MG/DL — HIGH (ref 70–99)
GLUCOSE BLDC GLUCOMTR-MCNC: 136 MG/DL — HIGH (ref 70–99)
GLUCOSE BLDC GLUCOMTR-MCNC: 137 MG/DL — HIGH (ref 70–99)
GLUCOSE BLDC GLUCOMTR-MCNC: 140 MG/DL — HIGH (ref 70–99)
GLUCOSE BLDC GLUCOMTR-MCNC: 140 MG/DL — HIGH (ref 70–99)
GLUCOSE BLDC GLUCOMTR-MCNC: 141 MG/DL — HIGH (ref 70–99)
GLUCOSE BLDC GLUCOMTR-MCNC: 142 MG/DL — HIGH (ref 70–99)
GLUCOSE BLDC GLUCOMTR-MCNC: 143 MG/DL — HIGH (ref 70–99)
GLUCOSE BLDC GLUCOMTR-MCNC: 144 MG/DL — HIGH (ref 70–99)
GLUCOSE BLDC GLUCOMTR-MCNC: 148 MG/DL — HIGH (ref 70–99)
GLUCOSE BLDC GLUCOMTR-MCNC: 154 MG/DL — HIGH (ref 70–99)
GLUCOSE BLDC GLUCOMTR-MCNC: 155 MG/DL — HIGH (ref 70–99)
GLUCOSE BLDC GLUCOMTR-MCNC: 157 MG/DL — HIGH (ref 70–99)
GLUCOSE BLDC GLUCOMTR-MCNC: 157 MG/DL — HIGH (ref 70–99)
GLUCOSE BLDC GLUCOMTR-MCNC: 158 MG/DL — HIGH (ref 70–99)
GLUCOSE BLDC GLUCOMTR-MCNC: 159 MG/DL — HIGH (ref 70–99)
GLUCOSE BLDC GLUCOMTR-MCNC: 160 MG/DL — HIGH (ref 70–99)
GLUCOSE BLDC GLUCOMTR-MCNC: 160 MG/DL — HIGH (ref 70–99)
GLUCOSE BLDC GLUCOMTR-MCNC: 161 MG/DL — HIGH (ref 70–99)
GLUCOSE BLDC GLUCOMTR-MCNC: 163 MG/DL — HIGH (ref 70–99)
GLUCOSE BLDC GLUCOMTR-MCNC: 164 MG/DL — HIGH (ref 70–99)
GLUCOSE BLDC GLUCOMTR-MCNC: 165 MG/DL — HIGH (ref 70–99)
GLUCOSE BLDC GLUCOMTR-MCNC: 183 MG/DL — HIGH (ref 70–99)
GLUCOSE BLDC GLUCOMTR-MCNC: 183 MG/DL — HIGH (ref 70–99)
GLUCOSE BLDC GLUCOMTR-MCNC: 184 MG/DL — HIGH (ref 70–99)
GLUCOSE BLDC GLUCOMTR-MCNC: 190 MG/DL — HIGH (ref 70–99)
GLUCOSE BLDC GLUCOMTR-MCNC: 192 MG/DL — HIGH (ref 70–99)
GLUCOSE BLDC GLUCOMTR-MCNC: 194 MG/DL — HIGH (ref 70–99)
GLUCOSE BLDC GLUCOMTR-MCNC: 198 MG/DL — HIGH (ref 70–99)
GLUCOSE BLDC GLUCOMTR-MCNC: 203 MG/DL — HIGH (ref 70–99)
GLUCOSE BLDC GLUCOMTR-MCNC: 205 MG/DL — HIGH (ref 70–99)
GLUCOSE BLDC GLUCOMTR-MCNC: 205 MG/DL — HIGH (ref 70–99)
GLUCOSE BLDC GLUCOMTR-MCNC: 208 MG/DL — HIGH (ref 70–99)
GLUCOSE BLDC GLUCOMTR-MCNC: 213 MG/DL — HIGH (ref 70–99)
GLUCOSE BLDC GLUCOMTR-MCNC: 226 MG/DL — HIGH (ref 70–99)
GLUCOSE BLDC GLUCOMTR-MCNC: 227 MG/DL — HIGH (ref 70–99)
GLUCOSE BLDC GLUCOMTR-MCNC: 229 MG/DL — HIGH (ref 70–99)
GLUCOSE BLDC GLUCOMTR-MCNC: 231 MG/DL — HIGH (ref 70–99)
GLUCOSE BLDC GLUCOMTR-MCNC: 231 MG/DL — HIGH (ref 70–99)
GLUCOSE BLDC GLUCOMTR-MCNC: 232 MG/DL — HIGH (ref 70–99)
GLUCOSE BLDC GLUCOMTR-MCNC: 238 MG/DL — HIGH (ref 70–99)
GLUCOSE BLDC GLUCOMTR-MCNC: 239 MG/DL — HIGH (ref 70–99)
GLUCOSE BLDC GLUCOMTR-MCNC: 240 MG/DL — HIGH (ref 70–99)
GLUCOSE BLDC GLUCOMTR-MCNC: 240 MG/DL — HIGH (ref 70–99)
GLUCOSE BLDC GLUCOMTR-MCNC: 242 MG/DL — HIGH (ref 70–99)
GLUCOSE BLDC GLUCOMTR-MCNC: 251 MG/DL — HIGH (ref 70–99)
GLUCOSE BLDC GLUCOMTR-MCNC: 255 MG/DL — HIGH (ref 70–99)
GLUCOSE BLDC GLUCOMTR-MCNC: 256 MG/DL — HIGH (ref 70–99)
GLUCOSE BLDC GLUCOMTR-MCNC: 286 MG/DL — HIGH (ref 70–99)
GLUCOSE BLDC GLUCOMTR-MCNC: 366 MG/DL — HIGH (ref 70–99)
GLUCOSE BLDC GLUCOMTR-MCNC: 451 MG/DL — CRITICAL HIGH (ref 70–99)
GLUCOSE BLDC GLUCOMTR-MCNC: 489 MG/DL — CRITICAL HIGH (ref 70–99)
GLUCOSE SERPL-MCNC: 110 MG/DL — HIGH (ref 70–99)
GLUCOSE SERPL-MCNC: 114 MG/DL — HIGH (ref 70–99)
GLUCOSE SERPL-MCNC: 117 MG/DL — HIGH (ref 70–99)
GLUCOSE SERPL-MCNC: 117 MG/DL — HIGH (ref 70–99)
GLUCOSE SERPL-MCNC: 119 MG/DL — HIGH (ref 70–99)
GLUCOSE SERPL-MCNC: 124 MG/DL — HIGH (ref 70–99)
GLUCOSE SERPL-MCNC: 126 MG/DL — HIGH (ref 70–99)
GLUCOSE SERPL-MCNC: 132 MG/DL — HIGH (ref 70–99)
GLUCOSE SERPL-MCNC: 132 MG/DL — HIGH (ref 70–99)
GLUCOSE SERPL-MCNC: 136 MG/DL — HIGH (ref 70–99)
GLUCOSE SERPL-MCNC: 137 MG/DL — HIGH (ref 70–99)
GLUCOSE SERPL-MCNC: 140 MG/DL — HIGH (ref 70–99)
GLUCOSE SERPL-MCNC: 141 MG/DL — HIGH (ref 70–99)
GLUCOSE SERPL-MCNC: 144 MG/DL — HIGH (ref 70–99)
GLUCOSE SERPL-MCNC: 145 MG/DL — HIGH (ref 70–99)
GLUCOSE SERPL-MCNC: 153 MG/DL — HIGH (ref 70–99)
GLUCOSE SERPL-MCNC: 155 MG/DL — HIGH (ref 70–99)
GLUCOSE SERPL-MCNC: 159 MG/DL — HIGH (ref 70–99)
GLUCOSE SERPL-MCNC: 162 MG/DL — HIGH (ref 70–99)
GLUCOSE SERPL-MCNC: 166 MG/DL — HIGH (ref 70–99)
GLUCOSE SERPL-MCNC: 174 MG/DL — HIGH (ref 70–99)
GLUCOSE SERPL-MCNC: 182 MG/DL — HIGH (ref 70–99)
GLUCOSE SERPL-MCNC: 223 MG/DL — HIGH (ref 70–99)
GLUCOSE SERPL-MCNC: 229 MG/DL — HIGH (ref 70–99)
GLUCOSE SERPL-MCNC: 233 MG/DL — HIGH (ref 70–99)
GLUCOSE SERPL-MCNC: 97 MG/DL — SIGNIFICANT CHANGE UP (ref 70–99)
GLUCOSE UR QL: NEGATIVE — SIGNIFICANT CHANGE UP
HCO3 BLDV-SCNC: 13 MMOL/L — LOW (ref 22–29)
HCT VFR BLD CALC: 34.7 % — LOW (ref 42–52)
HCT VFR BLD CALC: 35.5 % — LOW (ref 42–52)
HCT VFR BLD CALC: 35.9 % — LOW (ref 42–52)
HCT VFR BLD CALC: 35.9 % — LOW (ref 42–52)
HCT VFR BLD CALC: 36.5 % — LOW (ref 42–52)
HCT VFR BLD CALC: 36.6 % — LOW (ref 42–52)
HCT VFR BLD CALC: 36.7 % — LOW (ref 42–52)
HCT VFR BLD CALC: 36.7 % — LOW (ref 42–52)
HCT VFR BLD CALC: 36.8 % — LOW (ref 42–52)
HCT VFR BLD CALC: 37.2 % — LOW (ref 42–52)
HCT VFR BLD CALC: 37.3 % — LOW (ref 42–52)
HCT VFR BLD CALC: 37.4 % — LOW (ref 42–52)
HCT VFR BLD CALC: 37.4 % — LOW (ref 42–52)
HCT VFR BLD CALC: 37.6 % — LOW (ref 42–52)
HCT VFR BLD CALC: 37.8 % — LOW (ref 42–52)
HCT VFR BLD CALC: 38.3 % — LOW (ref 42–52)
HCT VFR BLD CALC: 39.1 % — LOW (ref 42–52)
HCT VFR BLD CALC: 41.1 % — LOW (ref 42–52)
HCT VFR BLD CALC: 43.6 % — SIGNIFICANT CHANGE UP (ref 42–52)
HCT VFR BLDA CALC: 39 % — SIGNIFICANT CHANGE UP (ref 34–44)
HDLC SERPL-MCNC: 41 MG/DL — SIGNIFICANT CHANGE UP
HGB BLD-MCNC: 11.7 G/DL — LOW (ref 14–18)
HGB BLD-MCNC: 11.9 G/DL — LOW (ref 14–18)
HGB BLD-MCNC: 12.3 G/DL — LOW (ref 14–18)
HGB BLD-MCNC: 12.4 G/DL — LOW (ref 14–18)
HGB BLD-MCNC: 12.5 G/DL — LOW (ref 14–18)
HGB BLD-MCNC: 12.7 G/DL — LOW (ref 14–18)
HGB BLD-MCNC: 12.8 G/DL — LOW (ref 14–18)
HGB BLD-MCNC: 12.8 G/DL — LOW (ref 14–18)
HGB BLD-MCNC: 13 G/DL — LOW (ref 14–18)
HGB BLD-MCNC: 13 G/DL — LOW (ref 14–18)
HGB BLD-MCNC: 13.1 G/DL — LOW (ref 14–18)
HGB BLD-MCNC: 13.5 G/DL — LOW (ref 14–18)
HYALINE CASTS # UR AUTO: 3 /LPF — SIGNIFICANT CHANGE UP (ref 0–7)
IMM GRANULOCYTES NFR BLD AUTO: 0.5 % — HIGH (ref 0.1–0.3)
IMM GRANULOCYTES NFR BLD AUTO: 0.6 % — HIGH (ref 0.1–0.3)
IMM GRANULOCYTES NFR BLD AUTO: 0.8 % — HIGH (ref 0.1–0.3)
IMM GRANULOCYTES NFR BLD AUTO: 0.8 % — HIGH (ref 0.1–0.3)
IMM GRANULOCYTES NFR BLD AUTO: 0.9 % — HIGH (ref 0.1–0.3)
IMM GRANULOCYTES NFR BLD AUTO: 1.2 % — HIGH (ref 0.1–0.3)
IMM GRANULOCYTES NFR BLD AUTO: 1.7 % — HIGH (ref 0.1–0.3)
IMM GRANULOCYTES NFR BLD AUTO: 1.7 % — HIGH (ref 0.1–0.3)
IMM GRANULOCYTES NFR BLD AUTO: 2.3 % — HIGH (ref 0.1–0.3)
IMM GRANULOCYTES NFR BLD AUTO: 3.4 % — HIGH (ref 0.1–0.3)
IMM GRANULOCYTES NFR BLD AUTO: 5.8 % — HIGH (ref 0.1–0.3)
IMM GRANULOCYTES NFR BLD AUTO: 6 % — HIGH (ref 0.1–0.3)
IMM GRANULOCYTES NFR BLD AUTO: 6 % — HIGH (ref 0.1–0.3)
IMM GRANULOCYTES NFR BLD AUTO: 6.4 % — HIGH (ref 0.1–0.3)
IMM GRANULOCYTES NFR BLD AUTO: 6.7 % — HIGH (ref 0.1–0.3)
IMM GRANULOCYTES NFR BLD AUTO: 6.7 % — HIGH (ref 0.1–0.3)
IMM GRANULOCYTES NFR BLD AUTO: 7.5 % — HIGH (ref 0.1–0.3)
INR BLD: 0.96 RATIO — SIGNIFICANT CHANGE UP (ref 0.65–1.3)
INR BLD: 0.97 RATIO — SIGNIFICANT CHANGE UP (ref 0.65–1.3)
INR BLD: 0.98 RATIO — SIGNIFICANT CHANGE UP (ref 0.65–1.3)
INR BLD: 1 RATIO — SIGNIFICANT CHANGE UP (ref 0.65–1.3)
INR BLD: 1 RATIO — SIGNIFICANT CHANGE UP (ref 0.65–1.3)
INR BLD: 1.06 RATIO — SIGNIFICANT CHANGE UP (ref 0.65–1.3)
INR BLD: 1.07 RATIO — SIGNIFICANT CHANGE UP (ref 0.65–1.3)
INR BLD: 1.14 RATIO — SIGNIFICANT CHANGE UP (ref 0.65–1.3)
IRON SATN MFR SERPL: 22 % — SIGNIFICANT CHANGE UP (ref 15–50)
IRON SATN MFR SERPL: 58 UG/DL — SIGNIFICANT CHANGE UP (ref 35–150)
KETONES UR-MCNC: NEGATIVE — SIGNIFICANT CHANGE UP
LACTATE BLDV-MCNC: 0.7 MMOL/L — SIGNIFICANT CHANGE UP (ref 0.5–1.6)
LACTATE SERPL-SCNC: 0.9 MMOL/L — SIGNIFICANT CHANGE UP (ref 0.7–2)
LACTATE SERPL-SCNC: 1.1 MMOL/L — SIGNIFICANT CHANGE UP (ref 0.7–2)
LACTATE SERPL-SCNC: 1.4 MMOL/L — SIGNIFICANT CHANGE UP (ref 0.7–2)
LEUKOCYTE ESTERASE UR-ACNC: NEGATIVE — SIGNIFICANT CHANGE UP
LIPID PNL WITH DIRECT LDL SERPL: 38 MG/DL — SIGNIFICANT CHANGE UP
LYMPHOCYTES # BLD AUTO: 0.3 K/UL — LOW (ref 1.2–3.4)
LYMPHOCYTES # BLD AUTO: 0.33 K/UL — LOW (ref 1.2–3.4)
LYMPHOCYTES # BLD AUTO: 0.37 K/UL — LOW (ref 1.2–3.4)
LYMPHOCYTES # BLD AUTO: 0.46 K/UL — LOW (ref 1.2–3.4)
LYMPHOCYTES # BLD AUTO: 0.46 K/UL — LOW (ref 1.2–3.4)
LYMPHOCYTES # BLD AUTO: 0.48 K/UL — LOW (ref 1.2–3.4)
LYMPHOCYTES # BLD AUTO: 0.48 K/UL — LOW (ref 1.2–3.4)
LYMPHOCYTES # BLD AUTO: 0.58 K/UL — LOW (ref 1.2–3.4)
LYMPHOCYTES # BLD AUTO: 0.58 K/UL — LOW (ref 1.2–3.4)
LYMPHOCYTES # BLD AUTO: 0.67 K/UL — LOW (ref 1.2–3.4)
LYMPHOCYTES # BLD AUTO: 0.69 K/UL — LOW (ref 1.2–3.4)
LYMPHOCYTES # BLD AUTO: 0.73 K/UL — LOW (ref 1.2–3.4)
LYMPHOCYTES # BLD AUTO: 0.78 K/UL — LOW (ref 1.2–3.4)
LYMPHOCYTES # BLD AUTO: 0.8 K/UL — LOW (ref 1.2–3.4)
LYMPHOCYTES # BLD AUTO: 0.83 K/UL — LOW (ref 1.2–3.4)
LYMPHOCYTES # BLD AUTO: 0.86 K/UL — LOW (ref 1.2–3.4)
LYMPHOCYTES # BLD AUTO: 1.05 K/UL — LOW (ref 1.2–3.4)
LYMPHOCYTES # BLD AUTO: 1.11 K/UL — LOW (ref 1.2–3.4)
LYMPHOCYTES # BLD AUTO: 1.37 K/UL — SIGNIFICANT CHANGE UP (ref 1.2–3.4)
LYMPHOCYTES # BLD AUTO: 10 % — LOW (ref 20.5–51.1)
LYMPHOCYTES # BLD AUTO: 10 % — LOW (ref 20.5–51.1)
LYMPHOCYTES # BLD AUTO: 10.7 % — LOW (ref 20.5–51.1)
LYMPHOCYTES # BLD AUTO: 11.6 % — LOW (ref 20.5–51.1)
LYMPHOCYTES # BLD AUTO: 12.1 % — LOW (ref 20.5–51.1)
LYMPHOCYTES # BLD AUTO: 14.1 % — LOW (ref 20.5–51.1)
LYMPHOCYTES # BLD AUTO: 14.2 % — LOW (ref 20.5–51.1)
LYMPHOCYTES # BLD AUTO: 16.1 % — LOW (ref 20.5–51.1)
LYMPHOCYTES # BLD AUTO: 2.9 % — LOW (ref 20.5–51.1)
LYMPHOCYTES # BLD AUTO: 23.4 % — SIGNIFICANT CHANGE UP (ref 20.5–51.1)
LYMPHOCYTES # BLD AUTO: 27.6 % — SIGNIFICANT CHANGE UP (ref 20.5–51.1)
LYMPHOCYTES # BLD AUTO: 3.6 % — LOW (ref 20.5–51.1)
LYMPHOCYTES # BLD AUTO: 5 % — LOW (ref 20.5–51.1)
LYMPHOCYTES # BLD AUTO: 5 % — LOW (ref 20.5–51.1)
LYMPHOCYTES # BLD AUTO: 5.6 % — LOW (ref 20.5–51.1)
LYMPHOCYTES # BLD AUTO: 6.4 % — LOW (ref 20.5–51.1)
LYMPHOCYTES # BLD AUTO: 6.8 % — LOW (ref 20.5–51.1)
LYMPHOCYTES # BLD AUTO: 7.7 % — LOW (ref 20.5–51.1)
LYMPHOCYTES # BLD AUTO: 9.9 % — LOW (ref 20.5–51.1)
MACROCYTES BLD QL: SLIGHT — SIGNIFICANT CHANGE UP
MAGNESIUM SERPL-MCNC: 1.6 MG/DL — LOW (ref 1.8–2.4)
MAGNESIUM SERPL-MCNC: 2 MG/DL — SIGNIFICANT CHANGE UP (ref 1.8–2.4)
MAGNESIUM SERPL-MCNC: 2.1 MG/DL — SIGNIFICANT CHANGE UP (ref 1.8–2.4)
MAGNESIUM SERPL-MCNC: 2.4 MG/DL — SIGNIFICANT CHANGE UP (ref 1.8–2.4)
MAGNESIUM SERPL-MCNC: 2.5 MG/DL — HIGH (ref 1.8–2.4)
MAGNESIUM SERPL-MCNC: 2.6 MG/DL — HIGH (ref 1.8–2.4)
MAGNESIUM SERPL-MCNC: 2.7 MG/DL — HIGH (ref 1.8–2.4)
MAGNESIUM SERPL-MCNC: 2.9 MG/DL — HIGH (ref 1.8–2.4)
MANUAL SMEAR VERIFICATION: SIGNIFICANT CHANGE UP
MANUAL SMEAR VERIFICATION: SIGNIFICANT CHANGE UP
MCHC RBC-ENTMCNC: 26.6 PG — LOW (ref 27–31)
MCHC RBC-ENTMCNC: 26.7 PG — LOW (ref 27–31)
MCHC RBC-ENTMCNC: 26.8 PG — LOW (ref 27–31)
MCHC RBC-ENTMCNC: 26.8 PG — LOW (ref 27–31)
MCHC RBC-ENTMCNC: 26.9 PG — LOW (ref 27–31)
MCHC RBC-ENTMCNC: 27 PG — SIGNIFICANT CHANGE UP (ref 27–31)
MCHC RBC-ENTMCNC: 27.1 PG — SIGNIFICANT CHANGE UP (ref 27–31)
MCHC RBC-ENTMCNC: 27.2 PG — SIGNIFICANT CHANGE UP (ref 27–31)
MCHC RBC-ENTMCNC: 27.3 PG — SIGNIFICANT CHANGE UP (ref 27–31)
MCHC RBC-ENTMCNC: 27.3 PG — SIGNIFICANT CHANGE UP (ref 27–31)
MCHC RBC-ENTMCNC: 27.4 PG — SIGNIFICANT CHANGE UP (ref 27–31)
MCHC RBC-ENTMCNC: 27.5 PG — SIGNIFICANT CHANGE UP (ref 27–31)
MCHC RBC-ENTMCNC: 27.5 PG — SIGNIFICANT CHANGE UP (ref 27–31)
MCHC RBC-ENTMCNC: 27.6 PG — SIGNIFICANT CHANGE UP (ref 27–31)
MCHC RBC-ENTMCNC: 27.6 PG — SIGNIFICANT CHANGE UP (ref 27–31)
MCHC RBC-ENTMCNC: 31 G/DL — LOW (ref 32–37)
MCHC RBC-ENTMCNC: 31.6 G/DL — LOW (ref 32–37)
MCHC RBC-ENTMCNC: 32 G/DL — SIGNIFICANT CHANGE UP (ref 32–37)
MCHC RBC-ENTMCNC: 32.5 G/DL — SIGNIFICANT CHANGE UP (ref 32–37)
MCHC RBC-ENTMCNC: 32.6 G/DL — SIGNIFICANT CHANGE UP (ref 32–37)
MCHC RBC-ENTMCNC: 33.1 G/DL — SIGNIFICANT CHANGE UP (ref 32–37)
MCHC RBC-ENTMCNC: 33.1 G/DL — SIGNIFICANT CHANGE UP (ref 32–37)
MCHC RBC-ENTMCNC: 33.4 G/DL — SIGNIFICANT CHANGE UP (ref 32–37)
MCHC RBC-ENTMCNC: 33.4 G/DL — SIGNIFICANT CHANGE UP (ref 32–37)
MCHC RBC-ENTMCNC: 33.5 G/DL — SIGNIFICANT CHANGE UP (ref 32–37)
MCHC RBC-ENTMCNC: 33.7 G/DL — SIGNIFICANT CHANGE UP (ref 32–37)
MCHC RBC-ENTMCNC: 33.8 G/DL — SIGNIFICANT CHANGE UP (ref 32–37)
MCHC RBC-ENTMCNC: 33.9 G/DL — SIGNIFICANT CHANGE UP (ref 32–37)
MCHC RBC-ENTMCNC: 34 G/DL — SIGNIFICANT CHANGE UP (ref 32–37)
MCHC RBC-ENTMCNC: 34.1 G/DL — SIGNIFICANT CHANGE UP (ref 32–37)
MCHC RBC-ENTMCNC: 34.2 G/DL — SIGNIFICANT CHANGE UP (ref 32–37)
MCHC RBC-ENTMCNC: 34.2 G/DL — SIGNIFICANT CHANGE UP (ref 32–37)
MCHC RBC-ENTMCNC: 34.5 G/DL — SIGNIFICANT CHANGE UP (ref 32–37)
MCHC RBC-ENTMCNC: 34.9 G/DL — SIGNIFICANT CHANGE UP (ref 32–37)
MCV RBC AUTO: 78.7 FL — LOW (ref 80–94)
MCV RBC AUTO: 79.2 FL — LOW (ref 80–94)
MCV RBC AUTO: 79.9 FL — LOW (ref 80–94)
MCV RBC AUTO: 80.3 FL — SIGNIFICANT CHANGE UP (ref 80–94)
MCV RBC AUTO: 81 FL — SIGNIFICANT CHANGE UP (ref 80–94)
MCV RBC AUTO: 81.3 FL — SIGNIFICANT CHANGE UP (ref 80–94)
MCV RBC AUTO: 81.8 FL — SIGNIFICANT CHANGE UP (ref 80–94)
MCV RBC AUTO: 81.8 FL — SIGNIFICANT CHANGE UP (ref 80–94)
MCV RBC AUTO: 82.1 FL — SIGNIFICANT CHANGE UP (ref 80–94)
MCV RBC AUTO: 82.2 FL — SIGNIFICANT CHANGE UP (ref 80–94)
MCV RBC AUTO: 82.5 FL — SIGNIFICANT CHANGE UP (ref 80–94)
MCV RBC AUTO: 83.2 FL — SIGNIFICANT CHANGE UP (ref 80–94)
MCV RBC AUTO: 83.9 FL — SIGNIFICANT CHANGE UP (ref 80–94)
MCV RBC AUTO: 85.3 FL — SIGNIFICANT CHANGE UP (ref 80–94)
MCV RBC AUTO: 87.4 FL — SIGNIFICANT CHANGE UP (ref 80–94)
METAMYELOCYTES # FLD: 1.7 % — HIGH (ref 0–0)
MONOCYTES # BLD AUTO: 0.08 K/UL — LOW (ref 0.1–0.6)
MONOCYTES # BLD AUTO: 0.11 K/UL — SIGNIFICANT CHANGE UP (ref 0.1–0.6)
MONOCYTES # BLD AUTO: 0.16 K/UL — SIGNIFICANT CHANGE UP (ref 0.1–0.6)
MONOCYTES # BLD AUTO: 0.22 K/UL — SIGNIFICANT CHANGE UP (ref 0.1–0.6)
MONOCYTES # BLD AUTO: 0.29 K/UL — SIGNIFICANT CHANGE UP (ref 0.1–0.6)
MONOCYTES # BLD AUTO: 0.34 K/UL — SIGNIFICANT CHANGE UP (ref 0.1–0.6)
MONOCYTES # BLD AUTO: 0.36 K/UL — SIGNIFICANT CHANGE UP (ref 0.1–0.6)
MONOCYTES # BLD AUTO: 0.37 K/UL — SIGNIFICANT CHANGE UP (ref 0.1–0.6)
MONOCYTES # BLD AUTO: 0.37 K/UL — SIGNIFICANT CHANGE UP (ref 0.1–0.6)
MONOCYTES # BLD AUTO: 0.4 K/UL — SIGNIFICANT CHANGE UP (ref 0.1–0.6)
MONOCYTES # BLD AUTO: 0.42 K/UL — SIGNIFICANT CHANGE UP (ref 0.1–0.6)
MONOCYTES # BLD AUTO: 0.42 K/UL — SIGNIFICANT CHANGE UP (ref 0.1–0.6)
MONOCYTES # BLD AUTO: 0.43 K/UL — SIGNIFICANT CHANGE UP (ref 0.1–0.6)
MONOCYTES # BLD AUTO: 0.45 K/UL — SIGNIFICANT CHANGE UP (ref 0.1–0.6)
MONOCYTES # BLD AUTO: 0.46 K/UL — SIGNIFICANT CHANGE UP (ref 0.1–0.6)
MONOCYTES # BLD AUTO: 0.48 K/UL — SIGNIFICANT CHANGE UP (ref 0.1–0.6)
MONOCYTES # BLD AUTO: 0.49 K/UL — SIGNIFICANT CHANGE UP (ref 0.1–0.6)
MONOCYTES # BLD AUTO: 0.52 K/UL — SIGNIFICANT CHANGE UP (ref 0.1–0.6)
MONOCYTES # BLD AUTO: 0.58 K/UL — SIGNIFICANT CHANGE UP (ref 0.1–0.6)
MONOCYTES NFR BLD AUTO: 2.4 % — SIGNIFICANT CHANGE UP (ref 1.7–9.3)
MONOCYTES NFR BLD AUTO: 3.6 % — SIGNIFICANT CHANGE UP (ref 1.7–9.3)
MONOCYTES NFR BLD AUTO: 3.6 % — SIGNIFICANT CHANGE UP (ref 1.7–9.3)
MONOCYTES NFR BLD AUTO: 3.7 % — SIGNIFICANT CHANGE UP (ref 1.7–9.3)
MONOCYTES NFR BLD AUTO: 3.7 % — SIGNIFICANT CHANGE UP (ref 1.7–9.3)
MONOCYTES NFR BLD AUTO: 3.8 % — SIGNIFICANT CHANGE UP (ref 1.7–9.3)
MONOCYTES NFR BLD AUTO: 3.9 % — SIGNIFICANT CHANGE UP (ref 1.7–9.3)
MONOCYTES NFR BLD AUTO: 4.3 % — SIGNIFICANT CHANGE UP (ref 1.7–9.3)
MONOCYTES NFR BLD AUTO: 4.6 % — SIGNIFICANT CHANGE UP (ref 1.7–9.3)
MONOCYTES NFR BLD AUTO: 4.6 % — SIGNIFICANT CHANGE UP (ref 1.7–9.3)
MONOCYTES NFR BLD AUTO: 4.7 % — SIGNIFICANT CHANGE UP (ref 1.7–9.3)
MONOCYTES NFR BLD AUTO: 5.3 % — SIGNIFICANT CHANGE UP (ref 1.7–9.3)
MONOCYTES NFR BLD AUTO: 5.6 % — SIGNIFICANT CHANGE UP (ref 1.7–9.3)
MONOCYTES NFR BLD AUTO: 5.8 % — SIGNIFICANT CHANGE UP (ref 1.7–9.3)
MONOCYTES NFR BLD AUTO: 6.1 % — SIGNIFICANT CHANGE UP (ref 1.7–9.3)
MONOCYTES NFR BLD AUTO: 6.3 % — SIGNIFICANT CHANGE UP (ref 1.7–9.3)
MONOCYTES NFR BLD AUTO: 6.8 % — SIGNIFICANT CHANGE UP (ref 1.7–9.3)
MONOCYTES NFR BLD AUTO: 7.4 % — SIGNIFICANT CHANGE UP (ref 1.7–9.3)
MONOCYTES NFR BLD AUTO: 8.1 % — SIGNIFICANT CHANGE UP (ref 1.7–9.3)
MYELOCYTES NFR BLD: 2.6 % — HIGH (ref 0–0)
NEUTROPHILS # BLD AUTO: 1.15 K/UL — LOW (ref 1.4–6.5)
NEUTROPHILS # BLD AUTO: 1.34 K/UL — LOW (ref 1.4–6.5)
NEUTROPHILS # BLD AUTO: 10.21 K/UL — HIGH (ref 1.4–6.5)
NEUTROPHILS # BLD AUTO: 11.77 K/UL — HIGH (ref 1.4–6.5)
NEUTROPHILS # BLD AUTO: 2.43 K/UL — SIGNIFICANT CHANGE UP (ref 1.4–6.5)
NEUTROPHILS # BLD AUTO: 4.48 K/UL — SIGNIFICANT CHANGE UP (ref 1.4–6.5)
NEUTROPHILS # BLD AUTO: 5.09 K/UL — SIGNIFICANT CHANGE UP (ref 1.4–6.5)
NEUTROPHILS # BLD AUTO: 5.37 K/UL — SIGNIFICANT CHANGE UP (ref 1.4–6.5)
NEUTROPHILS # BLD AUTO: 5.53 K/UL — SIGNIFICANT CHANGE UP (ref 1.4–6.5)
NEUTROPHILS # BLD AUTO: 5.57 K/UL — SIGNIFICANT CHANGE UP (ref 1.4–6.5)
NEUTROPHILS # BLD AUTO: 5.99 K/UL — SIGNIFICANT CHANGE UP (ref 1.4–6.5)
NEUTROPHILS # BLD AUTO: 6.12 K/UL — SIGNIFICANT CHANGE UP (ref 1.4–6.5)
NEUTROPHILS # BLD AUTO: 6.16 K/UL — SIGNIFICANT CHANGE UP (ref 1.4–6.5)
NEUTROPHILS # BLD AUTO: 6.89 K/UL — HIGH (ref 1.4–6.5)
NEUTROPHILS # BLD AUTO: 8.36 K/UL — HIGH (ref 1.4–6.5)
NEUTROPHILS # BLD AUTO: 8.46 K/UL — HIGH (ref 1.4–6.5)
NEUTROPHILS # BLD AUTO: 8.53 K/UL — HIGH (ref 1.4–6.5)
NEUTROPHILS # BLD AUTO: 8.7 K/UL — HIGH (ref 1.4–6.5)
NEUTROPHILS # BLD AUTO: 9.94 K/UL — HIGH (ref 1.4–6.5)
NEUTROPHILS NFR BLD AUTO: 66.1 % — SIGNIFICANT CHANGE UP (ref 42.2–75.2)
NEUTROPHILS NFR BLD AUTO: 68 % — SIGNIFICANT CHANGE UP (ref 42.2–75.2)
NEUTROPHILS NFR BLD AUTO: 70.8 % — SIGNIFICANT CHANGE UP (ref 42.2–75.2)
NEUTROPHILS NFR BLD AUTO: 72.1 % — SIGNIFICANT CHANGE UP (ref 42.2–75.2)
NEUTROPHILS NFR BLD AUTO: 75.2 % — SIGNIFICANT CHANGE UP (ref 42.2–75.2)
NEUTROPHILS NFR BLD AUTO: 75.3 % — HIGH (ref 42.2–75.2)
NEUTROPHILS NFR BLD AUTO: 75.9 % — HIGH (ref 42.2–75.2)
NEUTROPHILS NFR BLD AUTO: 76.7 % — HIGH (ref 42.2–75.2)
NEUTROPHILS NFR BLD AUTO: 78.4 % — HIGH (ref 42.2–75.2)
NEUTROPHILS NFR BLD AUTO: 81.7 % — HIGH (ref 42.2–75.2)
NEUTROPHILS NFR BLD AUTO: 81.8 % — HIGH (ref 42.2–75.2)
NEUTROPHILS NFR BLD AUTO: 83 % — HIGH (ref 42.2–75.2)
NEUTROPHILS NFR BLD AUTO: 83.8 % — HIGH (ref 42.2–75.2)
NEUTROPHILS NFR BLD AUTO: 86.6 % — HIGH (ref 42.2–75.2)
NEUTROPHILS NFR BLD AUTO: 87.6 % — HIGH (ref 42.2–75.2)
NEUTROPHILS NFR BLD AUTO: 88.8 % — HIGH (ref 42.2–75.2)
NEUTROPHILS NFR BLD AUTO: 90.5 % — HIGH (ref 42.2–75.2)
NEUTROPHILS NFR BLD AUTO: 92.2 % — HIGH (ref 42.2–75.2)
NEUTROPHILS NFR BLD AUTO: 93 % — HIGH (ref 42.2–75.2)
NEUTS BAND # BLD: 0.9 % — SIGNIFICANT CHANGE UP (ref 0–6)
NITRITE UR-MCNC: NEGATIVE — SIGNIFICANT CHANGE UP
NON HDL CHOLESTEROL: 46 MG/DL — SIGNIFICANT CHANGE UP
NRBC # BLD: 0 /100 WBCS — SIGNIFICANT CHANGE UP (ref 0–0)
NRBC # BLD: 5 /100 — HIGH (ref 0–0)
NRBC # BLD: SIGNIFICANT CHANGE UP /100 WBCS (ref 0–0)
NT-PROBNP SERPL-SCNC: 2718 PG/ML — HIGH (ref 0–300)
OSMOLALITY UR: 339 MOS/KG — SIGNIFICANT CHANGE UP (ref 50–1200)
OVALOCYTES BLD QL SMEAR: SLIGHT — SIGNIFICANT CHANGE UP
PCO2 BLDV: 34 MMHG — LOW (ref 41–51)
PH BLDV: 7.2 — LOW (ref 7.26–7.43)
PH UR: 6 — SIGNIFICANT CHANGE UP (ref 5–8)
PHOSPHATE SERPL-MCNC: 6.5 MG/DL — HIGH (ref 2.1–4.9)
PHOSPHATE SERPL-MCNC: 6.8 MG/DL — HIGH (ref 2.1–4.9)
PLAT MORPH BLD: NORMAL — SIGNIFICANT CHANGE UP
PLAT MORPH BLD: NORMAL — SIGNIFICANT CHANGE UP
PLATELET # BLD AUTO: 117 K/UL — LOW (ref 130–400)
PLATELET # BLD AUTO: 119 K/UL — LOW (ref 130–400)
PLATELET # BLD AUTO: 129 K/UL — LOW (ref 130–400)
PLATELET # BLD AUTO: 130 K/UL — SIGNIFICANT CHANGE UP (ref 130–400)
PLATELET # BLD AUTO: 132 K/UL — SIGNIFICANT CHANGE UP (ref 130–400)
PLATELET # BLD AUTO: 163 K/UL — SIGNIFICANT CHANGE UP (ref 130–400)
PLATELET # BLD AUTO: 168 K/UL — SIGNIFICANT CHANGE UP (ref 130–400)
PLATELET # BLD AUTO: 170 K/UL — SIGNIFICANT CHANGE UP (ref 130–400)
PLATELET # BLD AUTO: 177 K/UL — SIGNIFICANT CHANGE UP (ref 130–400)
PLATELET # BLD AUTO: 181 K/UL — SIGNIFICANT CHANGE UP (ref 130–400)
PLATELET # BLD AUTO: 183 K/UL — SIGNIFICANT CHANGE UP (ref 130–400)
PLATELET # BLD AUTO: 189 K/UL — SIGNIFICANT CHANGE UP (ref 130–400)
PLATELET # BLD AUTO: 201 K/UL — SIGNIFICANT CHANGE UP (ref 130–400)
PLATELET # BLD AUTO: 202 K/UL — SIGNIFICANT CHANGE UP (ref 130–400)
PLATELET # BLD AUTO: 204 K/UL — SIGNIFICANT CHANGE UP (ref 130–400)
PLATELET # BLD AUTO: 77 K/UL — LOW (ref 130–400)
PLATELET # BLD AUTO: 90 K/UL — LOW (ref 130–400)
PO2 BLDV: 73 MMHG — HIGH (ref 20–40)
POIKILOCYTOSIS BLD QL AUTO: SIGNIFICANT CHANGE UP
POIKILOCYTOSIS BLD QL AUTO: SLIGHT — SIGNIFICANT CHANGE UP
POLYCHROMASIA BLD QL SMEAR: SLIGHT — SIGNIFICANT CHANGE UP
POLYCHROMASIA BLD QL SMEAR: SLIGHT — SIGNIFICANT CHANGE UP
POTASSIUM BLDV-SCNC: 6 MMOL/L — HIGH (ref 3.3–5.6)
POTASSIUM SERPL-MCNC: 4.8 MMOL/L — SIGNIFICANT CHANGE UP (ref 3.5–5)
POTASSIUM SERPL-MCNC: 4.9 MMOL/L — SIGNIFICANT CHANGE UP (ref 3.5–5)
POTASSIUM SERPL-MCNC: 4.9 MMOL/L — SIGNIFICANT CHANGE UP (ref 3.5–5)
POTASSIUM SERPL-MCNC: 5 MMOL/L — SIGNIFICANT CHANGE UP (ref 3.5–5)
POTASSIUM SERPL-MCNC: 5.2 MMOL/L — HIGH (ref 3.5–5)
POTASSIUM SERPL-MCNC: 5.3 MMOL/L — HIGH (ref 3.5–5)
POTASSIUM SERPL-MCNC: 5.3 MMOL/L — HIGH (ref 3.5–5)
POTASSIUM SERPL-MCNC: 5.4 MMOL/L — HIGH (ref 3.5–5)
POTASSIUM SERPL-MCNC: 5.5 MMOL/L — HIGH (ref 3.5–5)
POTASSIUM SERPL-MCNC: 5.5 MMOL/L — HIGH (ref 3.5–5)
POTASSIUM SERPL-MCNC: 5.6 MMOL/L — HIGH (ref 3.5–5)
POTASSIUM SERPL-MCNC: 5.6 MMOL/L — HIGH (ref 3.5–5)
POTASSIUM SERPL-MCNC: 5.7 MMOL/L — HIGH (ref 3.5–5)
POTASSIUM SERPL-MCNC: 5.7 MMOL/L — HIGH (ref 3.5–5)
POTASSIUM SERPL-MCNC: 5.8 MMOL/L — HIGH (ref 3.5–5)
POTASSIUM SERPL-MCNC: 5.9 MMOL/L — HIGH (ref 3.5–5)
POTASSIUM SERPL-MCNC: 6.1 MMOL/L — CRITICAL HIGH (ref 3.5–5)
POTASSIUM SERPL-MCNC: 6.3 MMOL/L — CRITICAL HIGH (ref 3.5–5)
POTASSIUM SERPL-SCNC: 4.8 MMOL/L — SIGNIFICANT CHANGE UP (ref 3.5–5)
POTASSIUM SERPL-SCNC: 4.9 MMOL/L — SIGNIFICANT CHANGE UP (ref 3.5–5)
POTASSIUM SERPL-SCNC: 4.9 MMOL/L — SIGNIFICANT CHANGE UP (ref 3.5–5)
POTASSIUM SERPL-SCNC: 5 MMOL/L — SIGNIFICANT CHANGE UP (ref 3.5–5)
POTASSIUM SERPL-SCNC: 5.2 MMOL/L — HIGH (ref 3.5–5)
POTASSIUM SERPL-SCNC: 5.3 MMOL/L — HIGH (ref 3.5–5)
POTASSIUM SERPL-SCNC: 5.3 MMOL/L — HIGH (ref 3.5–5)
POTASSIUM SERPL-SCNC: 5.4 MMOL/L — HIGH (ref 3.5–5)
POTASSIUM SERPL-SCNC: 5.5 MMOL/L — HIGH (ref 3.5–5)
POTASSIUM SERPL-SCNC: 5.5 MMOL/L — HIGH (ref 3.5–5)
POTASSIUM SERPL-SCNC: 5.6 MMOL/L — HIGH (ref 3.5–5)
POTASSIUM SERPL-SCNC: 5.6 MMOL/L — HIGH (ref 3.5–5)
POTASSIUM SERPL-SCNC: 5.7 MMOL/L — HIGH (ref 3.5–5)
POTASSIUM SERPL-SCNC: 5.7 MMOL/L — HIGH (ref 3.5–5)
POTASSIUM SERPL-SCNC: 5.8 MMOL/L — HIGH (ref 3.5–5)
POTASSIUM SERPL-SCNC: 5.9 MMOL/L — HIGH (ref 3.5–5)
POTASSIUM SERPL-SCNC: 6.1 MMOL/L — CRITICAL HIGH (ref 3.5–5)
POTASSIUM SERPL-SCNC: 6.3 MMOL/L — CRITICAL HIGH (ref 3.5–5)
PROCALCITONIN SERPL-MCNC: 0.22 NG/ML — HIGH (ref 0.02–0.1)
PROCALCITONIN SERPL-MCNC: 0.53 NG/ML — HIGH (ref 0.02–0.1)
PROCALCITONIN SERPL-MCNC: 0.67 NG/ML — HIGH (ref 0.02–0.1)
PROCALCITONIN SERPL-MCNC: 0.92 NG/ML — HIGH (ref 0.02–0.1)
PROCALCITONIN SERPL-MCNC: 1.37 NG/ML — HIGH (ref 0.02–0.1)
PROT SERPL-MCNC: 5.3 G/DL — LOW (ref 6–8)
PROT SERPL-MCNC: 5.4 G/DL — LOW (ref 6–8)
PROT SERPL-MCNC: 5.5 G/DL — LOW (ref 6–8)
PROT SERPL-MCNC: 5.7 G/DL — LOW (ref 6–8)
PROT SERPL-MCNC: 5.7 G/DL — LOW (ref 6–8)
PROT SERPL-MCNC: 5.8 G/DL — LOW (ref 6–8)
PROT SERPL-MCNC: 5.8 G/DL — LOW (ref 6–8)
PROT SERPL-MCNC: 5.9 G/DL — LOW (ref 6–8)
PROT SERPL-MCNC: 6 G/DL — SIGNIFICANT CHANGE UP (ref 6–8)
PROT SERPL-MCNC: 6.1 G/DL — SIGNIFICANT CHANGE UP (ref 6–8)
PROT SERPL-MCNC: 6.2 G/DL — SIGNIFICANT CHANGE UP (ref 6–8)
PROT SERPL-MCNC: 6.3 G/DL — SIGNIFICANT CHANGE UP (ref 6–8)
PROT SERPL-MCNC: 6.3 G/DL — SIGNIFICANT CHANGE UP (ref 6–8)
PROT SERPL-MCNC: 6.4 G/DL — SIGNIFICANT CHANGE UP (ref 6–8)
PROT SERPL-MCNC: 6.6 G/DL — SIGNIFICANT CHANGE UP (ref 6–8)
PROT SERPL-MCNC: 6.7 G/DL — SIGNIFICANT CHANGE UP (ref 6–8)
PROT UR-MCNC: ABNORMAL
PROTHROM AB SERPL-ACNC: 11 SEC — SIGNIFICANT CHANGE UP (ref 9.95–12.87)
PROTHROM AB SERPL-ACNC: 11.2 SEC — SIGNIFICANT CHANGE UP (ref 9.95–12.87)
PROTHROM AB SERPL-ACNC: 11.3 SEC — SIGNIFICANT CHANGE UP (ref 9.95–12.87)
PROTHROM AB SERPL-ACNC: 11.5 SEC — SIGNIFICANT CHANGE UP (ref 9.95–12.87)
PROTHROM AB SERPL-ACNC: 11.5 SEC — SIGNIFICANT CHANGE UP (ref 9.95–12.87)
PROTHROM AB SERPL-ACNC: 12.2 SEC — SIGNIFICANT CHANGE UP (ref 9.95–12.87)
PROTHROM AB SERPL-ACNC: 12.3 SEC — SIGNIFICANT CHANGE UP (ref 9.95–12.87)
PROTHROM AB SERPL-ACNC: 13.1 SEC — HIGH (ref 9.95–12.87)
RBC # BLD: 4.32 M/UL — LOW (ref 4.7–6.1)
RBC # BLD: 4.34 M/UL — LOW (ref 4.7–6.1)
RBC # BLD: 4.39 M/UL — LOW (ref 4.7–6.1)
RBC # BLD: 4.44 M/UL — LOW (ref 4.7–6.1)
RBC # BLD: 4.47 M/UL — LOW (ref 4.7–6.1)
RBC # BLD: 4.48 M/UL — LOW (ref 4.7–6.1)
RBC # BLD: 4.5 M/UL — LOW (ref 4.7–6.1)
RBC # BLD: 4.53 M/UL — LOW (ref 4.7–6.1)
RBC # BLD: 4.56 M/UL — LOW (ref 4.7–6.1)
RBC # BLD: 4.57 M/UL — LOW (ref 4.7–6.1)
RBC # BLD: 4.58 M/UL — LOW (ref 4.7–6.1)
RBC # BLD: 4.66 M/UL — LOW (ref 4.7–6.1)
RBC # BLD: 4.68 M/UL — LOW (ref 4.7–6.1)
RBC # BLD: 4.71 M/UL — SIGNIFICANT CHANGE UP (ref 4.7–6.1)
RBC # BLD: 4.75 M/UL — SIGNIFICANT CHANGE UP (ref 4.7–6.1)
RBC # BLD: 4.77 M/UL — SIGNIFICANT CHANGE UP (ref 4.7–6.1)
RBC # BLD: 4.78 M/UL — SIGNIFICANT CHANGE UP (ref 4.7–6.1)
RBC # BLD: 4.82 M/UL — SIGNIFICANT CHANGE UP (ref 4.7–6.1)
RBC # BLD: 4.99 M/UL — SIGNIFICANT CHANGE UP (ref 4.7–6.1)
RBC # FLD: 13.6 % — SIGNIFICANT CHANGE UP (ref 11.5–14.5)
RBC # FLD: 13.7 % — SIGNIFICANT CHANGE UP (ref 11.5–14.5)
RBC # FLD: 13.8 % — SIGNIFICANT CHANGE UP (ref 11.5–14.5)
RBC # FLD: 13.9 % — SIGNIFICANT CHANGE UP (ref 11.5–14.5)
RBC # FLD: 13.9 % — SIGNIFICANT CHANGE UP (ref 11.5–14.5)
RBC # FLD: 14 % — SIGNIFICANT CHANGE UP (ref 11.5–14.5)
RBC # FLD: 14 % — SIGNIFICANT CHANGE UP (ref 11.5–14.5)
RBC # FLD: 14.1 % — SIGNIFICANT CHANGE UP (ref 11.5–14.5)
RBC # FLD: 14.1 % — SIGNIFICANT CHANGE UP (ref 11.5–14.5)
RBC # FLD: 14.3 % — SIGNIFICANT CHANGE UP (ref 11.5–14.5)
RBC # FLD: 14.5 % — SIGNIFICANT CHANGE UP (ref 11.5–14.5)
RBC # FLD: 14.6 % — HIGH (ref 11.5–14.5)
RBC # FLD: 14.8 % — HIGH (ref 11.5–14.5)
RBC # FLD: 14.9 % — HIGH (ref 11.5–14.5)
RBC # FLD: 15.1 % — HIGH (ref 11.5–14.5)
RBC # FLD: 15.2 % — HIGH (ref 11.5–14.5)
RBC # FLD: 15.8 % — HIGH (ref 11.5–14.5)
RBC BLD AUTO: ABNORMAL
RBC BLD AUTO: ABNORMAL
RBC CASTS # UR COMP ASSIST: 3 /HPF — SIGNIFICANT CHANGE UP (ref 0–4)
SAO2 % BLDV: 92 % — SIGNIFICANT CHANGE UP
SARS-COV-2 RNA SPEC QL NAA+PROBE: DETECTED
SCHISTOCYTES BLD QL AUTO: SLIGHT — SIGNIFICANT CHANGE UP
SMUDGE CELLS # BLD: PRESENT — SIGNIFICANT CHANGE UP
SMUDGE CELLS # BLD: PRESENT — SIGNIFICANT CHANGE UP
SODIUM SERPL-SCNC: 131 MMOL/L — LOW (ref 135–146)
SODIUM SERPL-SCNC: 132 MMOL/L — LOW (ref 135–146)
SODIUM SERPL-SCNC: 133 MMOL/L — LOW (ref 135–146)
SODIUM SERPL-SCNC: 134 MMOL/L — LOW (ref 135–146)
SODIUM SERPL-SCNC: 136 MMOL/L — SIGNIFICANT CHANGE UP (ref 135–146)
SODIUM SERPL-SCNC: 137 MMOL/L — SIGNIFICANT CHANGE UP (ref 135–146)
SODIUM SERPL-SCNC: 138 MMOL/L — SIGNIFICANT CHANGE UP (ref 135–146)
SODIUM SERPL-SCNC: 139 MMOL/L — SIGNIFICANT CHANGE UP (ref 135–146)
SODIUM SERPL-SCNC: 140 MMOL/L — SIGNIFICANT CHANGE UP (ref 135–146)
SODIUM SERPL-SCNC: 141 MMOL/L — SIGNIFICANT CHANGE UP (ref 135–146)
SODIUM SERPL-SCNC: 143 MMOL/L — SIGNIFICANT CHANGE UP (ref 135–146)
SODIUM SERPL-SCNC: 143 MMOL/L — SIGNIFICANT CHANGE UP (ref 135–146)
SODIUM SERPL-SCNC: 144 MMOL/L — SIGNIFICANT CHANGE UP (ref 135–146)
SODIUM SERPL-SCNC: 144 MMOL/L — SIGNIFICANT CHANGE UP (ref 135–146)
SODIUM SERPL-SCNC: 149 MMOL/L — HIGH (ref 135–146)
SODIUM SERPL-SCNC: 150 MMOL/L — HIGH (ref 135–146)
SODIUM SERPL-SCNC: 156 MMOL/L — HIGH (ref 135–146)
SODIUM SERPL-SCNC: 156 MMOL/L — HIGH (ref 135–146)
SODIUM UR-SCNC: <20 MMOL/L — SIGNIFICANT CHANGE UP
SP GR SPEC: 1.02 — SIGNIFICANT CHANGE UP (ref 1.01–1.03)
SPECIMEN SOURCE: SIGNIFICANT CHANGE UP
TIBC SERPL-MCNC: 259 UG/DL — SIGNIFICANT CHANGE UP (ref 220–430)
TRANSFERRIN SERPL-MCNC: 194 MG/DL — LOW (ref 200–360)
TRIGL SERPL-MCNC: 64 MG/DL — SIGNIFICANT CHANGE UP
TROPONIN T SERPL-MCNC: 0.14 NG/ML — CRITICAL HIGH
TROPONIN T SERPL-MCNC: 0.18 NG/ML — CRITICAL HIGH
TSH SERPL-MCNC: 0.46 UIU/ML — SIGNIFICANT CHANGE UP (ref 0.27–4.2)
UIBC SERPL-MCNC: 201 UG/DL — SIGNIFICANT CHANGE UP (ref 110–370)
UROBILINOGEN FLD QL: SIGNIFICANT CHANGE UP
UUN UR-MCNC: 588 MG/DL — SIGNIFICANT CHANGE UP
VARIANT LYMPHS # BLD: 3.6 % — SIGNIFICANT CHANGE UP (ref 0–5)
WBC # BLD: 1.74 K/UL — LOW (ref 4.8–10.8)
WBC # BLD: 1.97 K/UL — LOW (ref 4.8–10.8)
WBC # BLD: 10.19 K/UL — SIGNIFICANT CHANGE UP (ref 4.8–10.8)
WBC # BLD: 10.37 K/UL — SIGNIFICANT CHANGE UP (ref 4.8–10.8)
WBC # BLD: 11.35 K/UL — HIGH (ref 4.8–10.8)
WBC # BLD: 11.51 K/UL — HIGH (ref 4.8–10.8)
WBC # BLD: 12.76 K/UL — HIGH (ref 4.8–10.8)
WBC # BLD: 2.97 K/UL — LOW (ref 4.8–10.8)
WBC # BLD: 5.9 K/UL — SIGNIFICANT CHANGE UP (ref 4.8–10.8)
WBC # BLD: 6.2 K/UL — SIGNIFICANT CHANGE UP (ref 4.8–10.8)
WBC # BLD: 6.77 K/UL — SIGNIFICANT CHANGE UP (ref 4.8–10.8)
WBC # BLD: 7.41 K/UL — SIGNIFICANT CHANGE UP (ref 4.8–10.8)
WBC # BLD: 7.49 K/UL — SIGNIFICANT CHANGE UP (ref 4.8–10.8)
WBC # BLD: 7.81 K/UL — SIGNIFICANT CHANGE UP (ref 4.8–10.8)
WBC # BLD: 7.81 K/UL — SIGNIFICANT CHANGE UP (ref 4.8–10.8)
WBC # BLD: 8.53 K/UL — SIGNIFICANT CHANGE UP (ref 4.8–10.8)
WBC # BLD: 8.69 K/UL — SIGNIFICANT CHANGE UP (ref 4.8–10.8)
WBC # BLD: 9.17 K/UL — SIGNIFICANT CHANGE UP (ref 4.8–10.8)
WBC # BLD: 9.24 K/UL — SIGNIFICANT CHANGE UP (ref 4.8–10.8)
WBC # FLD AUTO: 1.74 K/UL — LOW (ref 4.8–10.8)
WBC # FLD AUTO: 1.97 K/UL — LOW (ref 4.8–10.8)
WBC # FLD AUTO: 10.19 K/UL — SIGNIFICANT CHANGE UP (ref 4.8–10.8)
WBC # FLD AUTO: 10.37 K/UL — SIGNIFICANT CHANGE UP (ref 4.8–10.8)
WBC # FLD AUTO: 11.35 K/UL — HIGH (ref 4.8–10.8)
WBC # FLD AUTO: 11.51 K/UL — HIGH (ref 4.8–10.8)
WBC # FLD AUTO: 12.76 K/UL — HIGH (ref 4.8–10.8)
WBC # FLD AUTO: 2.97 K/UL — LOW (ref 4.8–10.8)
WBC # FLD AUTO: 5.9 K/UL — SIGNIFICANT CHANGE UP (ref 4.8–10.8)
WBC # FLD AUTO: 6.2 K/UL — SIGNIFICANT CHANGE UP (ref 4.8–10.8)
WBC # FLD AUTO: 6.77 K/UL — SIGNIFICANT CHANGE UP (ref 4.8–10.8)
WBC # FLD AUTO: 7.41 K/UL — SIGNIFICANT CHANGE UP (ref 4.8–10.8)
WBC # FLD AUTO: 7.49 K/UL — SIGNIFICANT CHANGE UP (ref 4.8–10.8)
WBC # FLD AUTO: 7.81 K/UL — SIGNIFICANT CHANGE UP (ref 4.8–10.8)
WBC # FLD AUTO: 7.81 K/UL — SIGNIFICANT CHANGE UP (ref 4.8–10.8)
WBC # FLD AUTO: 8.53 K/UL — SIGNIFICANT CHANGE UP (ref 4.8–10.8)
WBC # FLD AUTO: 8.69 K/UL — SIGNIFICANT CHANGE UP (ref 4.8–10.8)
WBC # FLD AUTO: 9.17 K/UL — SIGNIFICANT CHANGE UP (ref 4.8–10.8)
WBC # FLD AUTO: 9.24 K/UL — SIGNIFICANT CHANGE UP (ref 4.8–10.8)
WBC UR QL: 3 /HPF — SIGNIFICANT CHANGE UP (ref 0–5)

## 2021-01-01 PROCEDURE — 71045 X-RAY EXAM CHEST 1 VIEW: CPT | Mod: 26

## 2021-01-01 PROCEDURE — 93010 ELECTROCARDIOGRAM REPORT: CPT

## 2021-01-01 PROCEDURE — 99233 SBSQ HOSP IP/OBS HIGH 50: CPT

## 2021-01-01 PROCEDURE — 99223 1ST HOSP IP/OBS HIGH 75: CPT

## 2021-01-01 PROCEDURE — 99291 CRITICAL CARE FIRST HOUR: CPT

## 2021-01-01 PROCEDURE — 99497 ADVNCD CARE PLAN 30 MIN: CPT | Mod: 25

## 2021-01-01 PROCEDURE — 99232 SBSQ HOSP IP/OBS MODERATE 35: CPT

## 2021-01-01 PROCEDURE — 71275 CT ANGIOGRAPHY CHEST: CPT | Mod: 26

## 2021-01-01 PROCEDURE — 74177 CT ABD & PELVIS W/CONTRAST: CPT | Mod: 26

## 2021-01-01 PROCEDURE — 71045 X-RAY EXAM CHEST 1 VIEW: CPT | Mod: 26,77

## 2021-01-01 PROCEDURE — 99222 1ST HOSP IP/OBS MODERATE 55: CPT

## 2021-01-01 PROCEDURE — 76775 US EXAM ABDO BACK WALL LIM: CPT | Mod: 26

## 2021-01-01 PROCEDURE — 93970 EXTREMITY STUDY: CPT | Mod: 26

## 2021-01-01 PROCEDURE — 93306 TTE W/DOPPLER COMPLETE: CPT | Mod: 26

## 2021-01-01 RX ORDER — ARGATROBAN 50 MG/50ML
1.5 INJECTION, SOLUTION INTRAVENOUS
Qty: 250 | Refills: 0 | Status: DISCONTINUED | OUTPATIENT
Start: 2021-01-01 | End: 2021-01-01

## 2021-01-01 RX ORDER — DEXAMETHASONE 0.5 MG/5ML
6 ELIXIR ORAL ONCE
Refills: 0 | Status: COMPLETED | OUTPATIENT
Start: 2021-01-01 | End: 2021-01-01

## 2021-01-01 RX ORDER — GUAIFENESIN/DEXTROMETHORPHAN 600MG-30MG
5 TABLET, EXTENDED RELEASE 12 HR ORAL EVERY 6 HOURS
Refills: 0 | Status: DISCONTINUED | OUTPATIENT
Start: 2021-01-01 | End: 2021-01-01

## 2021-01-01 RX ORDER — SODIUM BICARBONATE 1 MEQ/ML
325 SYRINGE (ML) INTRAVENOUS THREE TIMES A DAY
Refills: 0 | Status: DISCONTINUED | OUTPATIENT
Start: 2021-01-01 | End: 2021-01-01

## 2021-01-01 RX ORDER — FUROSEMIDE 40 MG
40 TABLET ORAL DAILY
Refills: 0 | Status: DISCONTINUED | OUTPATIENT
Start: 2021-01-01 | End: 2021-01-01

## 2021-01-01 RX ORDER — INSULIN HUMAN 100 [IU]/ML
10 INJECTION, SOLUTION SUBCUTANEOUS ONCE
Refills: 0 | Status: COMPLETED | OUTPATIENT
Start: 2021-01-01 | End: 2021-01-01

## 2021-01-01 RX ORDER — DEXTROSE 50 % IN WATER 50 %
15 SYRINGE (ML) INTRAVENOUS ONCE
Refills: 0 | Status: DISCONTINUED | OUTPATIENT
Start: 2021-01-01 | End: 2021-01-01

## 2021-01-01 RX ORDER — AZITHROMYCIN 500 MG/1
500 TABLET, FILM COATED ORAL EVERY 24 HOURS
Refills: 0 | Status: DISCONTINUED | OUTPATIENT
Start: 2021-01-01 | End: 2021-01-01

## 2021-01-01 RX ORDER — SODIUM CHLORIDE 5 G/100ML
150 INJECTION, SOLUTION INTRAVENOUS
Refills: 0 | Status: DISCONTINUED | OUTPATIENT
Start: 2021-01-01 | End: 2021-01-01

## 2021-01-01 RX ORDER — DEXTROSE 50 % IN WATER 50 %
12.5 SYRINGE (ML) INTRAVENOUS ONCE
Refills: 0 | Status: DISCONTINUED | OUTPATIENT
Start: 2021-01-01 | End: 2021-01-01

## 2021-01-01 RX ORDER — CEFEPIME 1 G/1
1000 INJECTION, POWDER, FOR SOLUTION INTRAMUSCULAR; INTRAVENOUS ONCE
Refills: 0 | Status: COMPLETED | OUTPATIENT
Start: 2021-01-01 | End: 2021-01-01

## 2021-01-01 RX ORDER — SODIUM CHLORIDE 9 MG/ML
1000 INJECTION, SOLUTION INTRAVENOUS
Refills: 0 | Status: DISCONTINUED | OUTPATIENT
Start: 2021-01-01 | End: 2021-01-01

## 2021-01-01 RX ORDER — FUROSEMIDE 40 MG
40 TABLET ORAL EVERY 12 HOURS
Refills: 0 | Status: DISCONTINUED | OUTPATIENT
Start: 2021-01-01 | End: 2021-01-01

## 2021-01-01 RX ORDER — INSULIN LISPRO 100/ML
10 VIAL (ML) SUBCUTANEOUS ONCE
Refills: 0 | Status: COMPLETED | OUTPATIENT
Start: 2021-01-01 | End: 2021-01-01

## 2021-01-01 RX ORDER — EZETIMIBE 10 MG/1
1 TABLET ORAL
Qty: 0 | Refills: 0 | DISCHARGE

## 2021-01-01 RX ORDER — ZOLPIDEM TARTRATE 10 MG/1
5 TABLET ORAL AT BEDTIME
Refills: 0 | Status: DISCONTINUED | OUTPATIENT
Start: 2021-01-01 | End: 2021-01-01

## 2021-01-01 RX ORDER — ACETAMINOPHEN 500 MG
650 TABLET ORAL EVERY 8 HOURS
Refills: 0 | Status: DISCONTINUED | OUTPATIENT
Start: 2021-01-01 | End: 2021-01-01

## 2021-01-01 RX ORDER — SODIUM ZIRCONIUM CYCLOSILICATE 10 G/10G
10 POWDER, FOR SUSPENSION ORAL ONCE
Refills: 0 | Status: COMPLETED | OUTPATIENT
Start: 2021-01-01 | End: 2021-01-01

## 2021-01-01 RX ORDER — FONDAPARINUX SODIUM 2.5 MG/.5ML
2.5 INJECTION, SOLUTION SUBCUTANEOUS DAILY
Refills: 0 | Status: DISCONTINUED | OUTPATIENT
Start: 2021-01-01 | End: 2021-01-01

## 2021-01-01 RX ORDER — SODIUM ZIRCONIUM CYCLOSILICATE 10 G/10G
10 POWDER, FOR SUSPENSION ORAL DAILY
Refills: 0 | Status: DISCONTINUED | OUTPATIENT
Start: 2021-01-01 | End: 2021-01-01

## 2021-01-01 RX ORDER — ASPIRIN/CALCIUM CARB/MAGNESIUM 324 MG
81 TABLET ORAL DAILY
Refills: 0 | Status: DISCONTINUED | OUTPATIENT
Start: 2021-01-01 | End: 2021-01-01

## 2021-01-01 RX ORDER — SODIUM CHLORIDE 5 G/100ML
150 INJECTION, SOLUTION INTRAVENOUS
Refills: 0 | Status: COMPLETED | OUTPATIENT
Start: 2021-01-01 | End: 2021-01-01

## 2021-01-01 RX ORDER — AZITHROMYCIN 500 MG/1
500 TABLET, FILM COATED ORAL ONCE
Refills: 0 | Status: COMPLETED | OUTPATIENT
Start: 2021-01-01 | End: 2021-01-01

## 2021-01-01 RX ORDER — FUROSEMIDE 40 MG
60 TABLET ORAL ONCE
Refills: 0 | Status: COMPLETED | OUTPATIENT
Start: 2021-01-01 | End: 2021-01-01

## 2021-01-01 RX ORDER — LANOLIN ALCOHOL/MO/W.PET/CERES
5 CREAM (GRAM) TOPICAL AT BEDTIME
Refills: 0 | Status: DISCONTINUED | OUTPATIENT
Start: 2021-01-01 | End: 2021-01-01

## 2021-01-01 RX ORDER — ASCORBIC ACID 60 MG
1 TABLET,CHEWABLE ORAL
Qty: 0 | Refills: 0 | DISCHARGE

## 2021-01-01 RX ORDER — SODIUM ZIRCONIUM CYCLOSILICATE 10 G/10G
10 POWDER, FOR SUSPENSION ORAL EVERY 8 HOURS
Refills: 0 | Status: DISCONTINUED | OUTPATIENT
Start: 2021-01-01 | End: 2021-01-01

## 2021-01-01 RX ORDER — DEXAMETHASONE 0.5 MG/5ML
6 ELIXIR ORAL DAILY
Refills: 0 | Status: DISCONTINUED | OUTPATIENT
Start: 2021-01-01 | End: 2021-01-01

## 2021-01-01 RX ORDER — FONDAPARINUX SODIUM 2.5 MG/.5ML
1.5 INJECTION, SOLUTION SUBCUTANEOUS DAILY
Refills: 0 | Status: DISCONTINUED | OUTPATIENT
Start: 2021-01-01 | End: 2021-01-01

## 2021-01-01 RX ORDER — METOPROLOL TARTRATE 50 MG
25 TABLET ORAL DAILY
Refills: 0 | Status: DISCONTINUED | OUTPATIENT
Start: 2021-01-01 | End: 2021-01-01

## 2021-01-01 RX ORDER — CALCIUM GLUCONATE 100 MG/ML
2 VIAL (ML) INTRAVENOUS ONCE
Refills: 0 | Status: COMPLETED | OUTPATIENT
Start: 2021-01-01 | End: 2021-01-01

## 2021-01-01 RX ORDER — SODIUM CHLORIDE 5 G/100ML
500 INJECTION, SOLUTION INTRAVENOUS
Refills: 0 | Status: DISCONTINUED | OUTPATIENT
Start: 2021-01-01 | End: 2021-01-01

## 2021-01-01 RX ORDER — FUROSEMIDE 40 MG
1 TABLET ORAL
Qty: 0 | Refills: 0 | DISCHARGE

## 2021-01-01 RX ORDER — ASPIRIN/CALCIUM CARB/MAGNESIUM 324 MG
1 TABLET ORAL
Qty: 0 | Refills: 0 | DISCHARGE

## 2021-01-01 RX ORDER — LANOLIN ALCOHOL/MO/W.PET/CERES
3 CREAM (GRAM) TOPICAL ONCE
Refills: 0 | Status: COMPLETED | OUTPATIENT
Start: 2021-01-01 | End: 2021-01-01

## 2021-01-01 RX ORDER — METOPROLOL TARTRATE 50 MG
1 TABLET ORAL
Qty: 0 | Refills: 0 | DISCHARGE

## 2021-01-01 RX ORDER — ELECTROLYTE SOLUTION,INJ
1 VIAL (ML) INTRAVENOUS
Refills: 0 | Status: DISCONTINUED | OUTPATIENT
Start: 2021-01-01 | End: 2021-01-01

## 2021-01-01 RX ORDER — CEFEPIME 1 G/1
INJECTION, POWDER, FOR SOLUTION INTRAMUSCULAR; INTRAVENOUS
Refills: 0 | Status: DISCONTINUED | OUTPATIENT
Start: 2021-01-01 | End: 2021-01-01

## 2021-01-01 RX ORDER — DEXTROSE 50 % IN WATER 50 %
25 SYRINGE (ML) INTRAVENOUS ONCE
Refills: 0 | Status: DISCONTINUED | OUTPATIENT
Start: 2021-01-01 | End: 2021-01-01

## 2021-01-01 RX ORDER — FUROSEMIDE 40 MG
40 TABLET ORAL ONCE
Refills: 0 | Status: COMPLETED | OUTPATIENT
Start: 2021-01-01 | End: 2021-01-01

## 2021-01-01 RX ORDER — INSULIN LISPRO 100/ML
VIAL (ML) SUBCUTANEOUS
Refills: 0 | Status: DISCONTINUED | OUTPATIENT
Start: 2021-01-01 | End: 2021-01-01

## 2021-01-01 RX ORDER — CEFEPIME 1 G/1
1000 INJECTION, POWDER, FOR SOLUTION INTRAMUSCULAR; INTRAVENOUS EVERY 12 HOURS
Refills: 0 | Status: DISCONTINUED | OUTPATIENT
Start: 2021-01-01 | End: 2021-01-01

## 2021-01-01 RX ORDER — SACUBITRIL AND VALSARTAN 24; 26 MG/1; MG/1
1 TABLET, FILM COATED ORAL
Qty: 0 | Refills: 0 | DISCHARGE

## 2021-01-01 RX ORDER — HEPARIN SODIUM 5000 [USP'U]/ML
7500 INJECTION INTRAVENOUS; SUBCUTANEOUS EVERY 8 HOURS
Refills: 0 | Status: DISCONTINUED | OUTPATIENT
Start: 2021-01-01 | End: 2021-01-01

## 2021-01-01 RX ORDER — CHLORHEXIDINE GLUCONATE 213 G/1000ML
1 SOLUTION TOPICAL DAILY
Refills: 0 | Status: DISCONTINUED | OUTPATIENT
Start: 2021-01-01 | End: 2021-01-01

## 2021-01-01 RX ORDER — REMDESIVIR 5 MG/ML
INJECTION INTRAVENOUS
Refills: 0 | Status: DISCONTINUED | OUTPATIENT
Start: 2021-01-01 | End: 2021-01-01

## 2021-01-01 RX ORDER — DEXTROSE 50 % IN WATER 50 %
100 SYRINGE (ML) INTRAVENOUS ONCE
Refills: 0 | Status: COMPLETED | OUTPATIENT
Start: 2021-01-01 | End: 2021-01-01

## 2021-01-01 RX ORDER — INFLUENZA VIRUS VACCINE 15; 15; 15; 15 UG/.5ML; UG/.5ML; UG/.5ML; UG/.5ML
0.5 SUSPENSION INTRAMUSCULAR ONCE
Refills: 0 | Status: DISCONTINUED | OUTPATIENT
Start: 2021-01-01 | End: 2021-01-01

## 2021-01-01 RX ORDER — ATORVASTATIN CALCIUM 80 MG/1
1 TABLET, FILM COATED ORAL
Qty: 0 | Refills: 0 | DISCHARGE

## 2021-01-01 RX ORDER — HEPARIN SODIUM 5000 [USP'U]/ML
5000 INJECTION INTRAVENOUS; SUBCUTANEOUS EVERY 8 HOURS
Refills: 0 | Status: DISCONTINUED | OUTPATIENT
Start: 2021-01-01 | End: 2021-01-01

## 2021-01-01 RX ORDER — CHOLECALCIFEROL (VITAMIN D3) 125 MCG
1 CAPSULE ORAL
Qty: 0 | Refills: 0 | DISCHARGE

## 2021-01-01 RX ORDER — GLUCAGON INJECTION, SOLUTION 0.5 MG/.1ML
1 INJECTION, SOLUTION SUBCUTANEOUS ONCE
Refills: 0 | Status: DISCONTINUED | OUTPATIENT
Start: 2021-01-01 | End: 2021-01-01

## 2021-01-01 RX ORDER — INSULIN LISPRO 100/ML
2 VIAL (ML) SUBCUTANEOUS ONCE
Refills: 0 | Status: DISCONTINUED | OUTPATIENT
Start: 2021-01-01 | End: 2021-01-01

## 2021-01-01 RX ORDER — CEFTRIAXONE 500 MG/1
1000 INJECTION, POWDER, FOR SOLUTION INTRAMUSCULAR; INTRAVENOUS ONCE
Refills: 0 | Status: COMPLETED | OUTPATIENT
Start: 2021-01-01 | End: 2021-01-01

## 2021-01-01 RX ORDER — HYDROMORPHONE HYDROCHLORIDE 2 MG/ML
0.25 INJECTION INTRAMUSCULAR; INTRAVENOUS; SUBCUTANEOUS
Refills: 0 | Status: DISCONTINUED | OUTPATIENT
Start: 2021-01-01 | End: 2021-01-01

## 2021-01-01 RX ORDER — POLYETHYLENE GLYCOL 3350 17 G/17G
17 POWDER, FOR SOLUTION ORAL EVERY 12 HOURS
Refills: 0 | Status: DISCONTINUED | OUTPATIENT
Start: 2021-01-01 | End: 2021-01-01

## 2021-01-01 RX ORDER — LANOLIN ALCOHOL/MO/W.PET/CERES
3 CREAM (GRAM) TOPICAL AT BEDTIME
Refills: 0 | Status: DISCONTINUED | OUTPATIENT
Start: 2021-01-01 | End: 2021-01-01

## 2021-01-01 RX ORDER — FUROSEMIDE 40 MG
60 TABLET ORAL DAILY
Refills: 0 | Status: DISCONTINUED | OUTPATIENT
Start: 2021-01-01 | End: 2021-01-01

## 2021-01-01 RX ORDER — FUROSEMIDE 40 MG
80 TABLET ORAL ONCE
Refills: 0 | Status: COMPLETED | OUTPATIENT
Start: 2021-01-01 | End: 2021-01-01

## 2021-01-01 RX ORDER — MAGNESIUM SULFATE 500 MG/ML
2 VIAL (ML) INJECTION
Refills: 0 | Status: COMPLETED | OUTPATIENT
Start: 2021-01-01 | End: 2021-01-01

## 2021-01-01 RX ORDER — REMDESIVIR 5 MG/ML
200 INJECTION INTRAVENOUS EVERY 24 HOURS
Refills: 0 | Status: COMPLETED | OUTPATIENT
Start: 2021-01-01 | End: 2021-01-01

## 2021-01-01 RX ORDER — ACETAMINOPHEN 500 MG
975 TABLET ORAL ONCE
Refills: 0 | Status: COMPLETED | OUTPATIENT
Start: 2021-01-01 | End: 2021-01-01

## 2021-01-01 RX ORDER — SENNA PLUS 8.6 MG/1
2 TABLET ORAL AT BEDTIME
Refills: 0 | Status: DISCONTINUED | OUTPATIENT
Start: 2021-01-01 | End: 2021-01-01

## 2021-01-01 RX ORDER — DEXTROSE 50 % IN WATER 50 %
50 SYRINGE (ML) INTRAVENOUS ONCE
Refills: 0 | Status: COMPLETED | OUTPATIENT
Start: 2021-01-01 | End: 2021-01-01

## 2021-01-01 RX ORDER — CEFTRIAXONE 500 MG/1
2000 INJECTION, POWDER, FOR SOLUTION INTRAMUSCULAR; INTRAVENOUS EVERY 24 HOURS
Refills: 0 | Status: DISCONTINUED | OUTPATIENT
Start: 2021-01-01 | End: 2021-01-01

## 2021-01-01 RX ORDER — BUMETANIDE 0.25 MG/ML
2 INJECTION INTRAMUSCULAR; INTRAVENOUS EVERY 12 HOURS
Refills: 0 | Status: DISCONTINUED | OUTPATIENT
Start: 2021-01-01 | End: 2021-01-01

## 2021-01-01 RX ORDER — ATORVASTATIN CALCIUM 80 MG/1
80 TABLET, FILM COATED ORAL AT BEDTIME
Refills: 0 | Status: DISCONTINUED | OUTPATIENT
Start: 2021-01-01 | End: 2021-01-01

## 2021-01-01 RX ORDER — TOCILIZUMAB 20 MG/ML
800 INJECTION, SOLUTION, CONCENTRATE INTRAVENOUS ONCE
Refills: 0 | Status: COMPLETED | OUTPATIENT
Start: 2021-01-01 | End: 2021-01-01

## 2021-01-01 RX ORDER — I.V. FAT EMULSION 20 G/100ML
0.71 EMULSION INTRAVENOUS
Qty: 100.39 | Refills: 0 | Status: DISCONTINUED | OUTPATIENT
Start: 2021-01-01 | End: 2021-01-01

## 2021-01-01 RX ORDER — SODIUM BICARBONATE 1 MEQ/ML
650 SYRINGE (ML) INTRAVENOUS THREE TIMES A DAY
Refills: 0 | Status: DISCONTINUED | OUTPATIENT
Start: 2021-01-01 | End: 2021-01-01

## 2021-01-01 RX ORDER — PREGABALIN 225 MG/1
1 CAPSULE ORAL
Qty: 0 | Refills: 0 | DISCHARGE

## 2021-01-01 RX ADMIN — Medication 81 MILLIGRAM(S): at 11:46

## 2021-01-01 RX ADMIN — Medication 200 GRAM(S): at 15:01

## 2021-01-01 RX ADMIN — Medication 5 MILLIGRAM(S): at 22:54

## 2021-01-01 RX ADMIN — ATORVASTATIN CALCIUM 80 MILLIGRAM(S): 80 TABLET, FILM COATED ORAL at 21:52

## 2021-01-01 RX ADMIN — BUMETANIDE 2 MILLIGRAM(S): 0.25 INJECTION INTRAMUSCULAR; INTRAVENOUS at 05:58

## 2021-01-01 RX ADMIN — SODIUM ZIRCONIUM CYCLOSILICATE 10 GRAM(S): 10 POWDER, FOR SUSPENSION ORAL at 21:29

## 2021-01-01 RX ADMIN — CEFEPIME 100 MILLIGRAM(S): 1 INJECTION, POWDER, FOR SOLUTION INTRAMUSCULAR; INTRAVENOUS at 11:54

## 2021-01-01 RX ADMIN — POLYETHYLENE GLYCOL 3350 17 GRAM(S): 17 POWDER, FOR SOLUTION ORAL at 16:54

## 2021-01-01 RX ADMIN — SODIUM ZIRCONIUM CYCLOSILICATE 10 GRAM(S): 10 POWDER, FOR SUSPENSION ORAL at 12:22

## 2021-01-01 RX ADMIN — BUMETANIDE 2 MILLIGRAM(S): 0.25 INJECTION INTRAMUSCULAR; INTRAVENOUS at 06:09

## 2021-01-01 RX ADMIN — POLYETHYLENE GLYCOL 3350 17 GRAM(S): 17 POWDER, FOR SOLUTION ORAL at 17:09

## 2021-01-01 RX ADMIN — Medication 25 MILLIGRAM(S): at 06:28

## 2021-01-01 RX ADMIN — Medication 6 MILLIGRAM(S): at 23:27

## 2021-01-01 RX ADMIN — SODIUM ZIRCONIUM CYCLOSILICATE 10 GRAM(S): 10 POWDER, FOR SUSPENSION ORAL at 11:55

## 2021-01-01 RX ADMIN — Medication 6 MILLIGRAM(S): at 05:41

## 2021-01-01 RX ADMIN — Medication 6: at 12:46

## 2021-01-01 RX ADMIN — HEPARIN SODIUM 7500 UNIT(S): 5000 INJECTION INTRAVENOUS; SUBCUTANEOUS at 05:12

## 2021-01-01 RX ADMIN — SENNA PLUS 2 TABLET(S): 8.6 TABLET ORAL at 21:50

## 2021-01-01 RX ADMIN — Medication 81 MILLIGRAM(S): at 12:18

## 2021-01-01 RX ADMIN — Medication 325 MILLIGRAM(S): at 00:14

## 2021-01-01 RX ADMIN — Medication 6: at 11:24

## 2021-01-01 RX ADMIN — SODIUM ZIRCONIUM CYCLOSILICATE 10 GRAM(S): 10 POWDER, FOR SUSPENSION ORAL at 12:27

## 2021-01-01 RX ADMIN — Medication 6 MILLIGRAM(S): at 06:10

## 2021-01-01 RX ADMIN — Medication 5 MILLILITER(S): at 22:20

## 2021-01-01 RX ADMIN — SODIUM ZIRCONIUM CYCLOSILICATE 10 GRAM(S): 10 POWDER, FOR SUSPENSION ORAL at 17:06

## 2021-01-01 RX ADMIN — Medication 6 MILLIGRAM(S): at 06:19

## 2021-01-01 RX ADMIN — Medication 650 MILLIGRAM(S): at 13:14

## 2021-01-01 RX ADMIN — HEPARIN SODIUM 5000 UNIT(S): 5000 INJECTION INTRAVENOUS; SUBCUTANEOUS at 22:01

## 2021-01-01 RX ADMIN — SENNA PLUS 2 TABLET(S): 8.6 TABLET ORAL at 22:04

## 2021-01-01 RX ADMIN — BUMETANIDE 2 MILLIGRAM(S): 0.25 INJECTION INTRAMUSCULAR; INTRAVENOUS at 17:16

## 2021-01-01 RX ADMIN — Medication 81 MILLIGRAM(S): at 12:30

## 2021-01-01 RX ADMIN — HEPARIN SODIUM 5000 UNIT(S): 5000 INJECTION INTRAVENOUS; SUBCUTANEOUS at 23:07

## 2021-01-01 RX ADMIN — BUMETANIDE 2 MILLIGRAM(S): 0.25 INJECTION INTRAMUSCULAR; INTRAVENOUS at 05:35

## 2021-01-01 RX ADMIN — CHLORHEXIDINE GLUCONATE 1 APPLICATION(S): 213 SOLUTION TOPICAL at 12:17

## 2021-01-01 RX ADMIN — CHLORHEXIDINE GLUCONATE 1 APPLICATION(S): 213 SOLUTION TOPICAL at 12:43

## 2021-01-01 RX ADMIN — Medication 4: at 12:58

## 2021-01-01 RX ADMIN — Medication 25 MILLIGRAM(S): at 05:35

## 2021-01-01 RX ADMIN — Medication 2: at 11:58

## 2021-01-01 RX ADMIN — Medication 40 MILLIGRAM(S): at 10:53

## 2021-01-01 RX ADMIN — HEPARIN SODIUM 5000 UNIT(S): 5000 INJECTION INTRAVENOUS; SUBCUTANEOUS at 05:58

## 2021-01-01 RX ADMIN — Medication 6 MILLIGRAM(S): at 06:14

## 2021-01-01 RX ADMIN — Medication 81 MILLIGRAM(S): at 11:24

## 2021-01-01 RX ADMIN — AZITHROMYCIN 255 MILLIGRAM(S): 500 TABLET, FILM COATED ORAL at 10:53

## 2021-01-01 RX ADMIN — Medication 6: at 12:57

## 2021-01-01 RX ADMIN — HEPARIN SODIUM 5000 UNIT(S): 5000 INJECTION INTRAVENOUS; SUBCUTANEOUS at 06:14

## 2021-01-01 RX ADMIN — BUMETANIDE 2 MILLIGRAM(S): 0.25 INJECTION INTRAMUSCULAR; INTRAVENOUS at 17:20

## 2021-01-01 RX ADMIN — Medication 60 MILLIGRAM(S): at 09:27

## 2021-01-01 RX ADMIN — Medication 81 MILLIGRAM(S): at 11:25

## 2021-01-01 RX ADMIN — Medication 25 MILLIGRAM(S): at 05:31

## 2021-01-01 RX ADMIN — Medication 25 MILLIGRAM(S): at 06:20

## 2021-01-01 RX ADMIN — Medication 650 MILLIGRAM(S): at 22:46

## 2021-01-01 RX ADMIN — Medication 25 MILLIGRAM(S): at 06:10

## 2021-01-01 RX ADMIN — Medication 2: at 12:42

## 2021-01-01 RX ADMIN — Medication 40 MILLIGRAM(S): at 15:01

## 2021-01-01 RX ADMIN — Medication 5 MILLIGRAM(S): at 22:46

## 2021-01-01 RX ADMIN — SODIUM ZIRCONIUM CYCLOSILICATE 10 GRAM(S): 10 POWDER, FOR SUSPENSION ORAL at 06:14

## 2021-01-01 RX ADMIN — Medication 50 GRAM(S): at 12:54

## 2021-01-01 RX ADMIN — Medication 6 MILLIGRAM(S): at 05:38

## 2021-01-01 RX ADMIN — ATORVASTATIN CALCIUM 80 MILLIGRAM(S): 80 TABLET, FILM COATED ORAL at 22:54

## 2021-01-01 RX ADMIN — HEPARIN SODIUM 5000 UNIT(S): 5000 INJECTION INTRAVENOUS; SUBCUTANEOUS at 05:09

## 2021-01-01 RX ADMIN — Medication 4: at 12:20

## 2021-01-01 RX ADMIN — ATORVASTATIN CALCIUM 80 MILLIGRAM(S): 80 TABLET, FILM COATED ORAL at 00:15

## 2021-01-01 RX ADMIN — Medication 2: at 17:29

## 2021-01-01 RX ADMIN — Medication 81 MILLIGRAM(S): at 11:41

## 2021-01-01 RX ADMIN — Medication 60 MILLIGRAM(S): at 06:28

## 2021-01-01 RX ADMIN — INSULIN HUMAN 10 UNIT(S): 100 INJECTION, SOLUTION SUBCUTANEOUS at 20:13

## 2021-01-01 RX ADMIN — SODIUM ZIRCONIUM CYCLOSILICATE 10 GRAM(S): 10 POWDER, FOR SUSPENSION ORAL at 06:21

## 2021-01-01 RX ADMIN — SODIUM ZIRCONIUM CYCLOSILICATE 10 GRAM(S): 10 POWDER, FOR SUSPENSION ORAL at 12:06

## 2021-01-01 RX ADMIN — Medication 6 MILLIGRAM(S): at 05:02

## 2021-01-01 RX ADMIN — Medication 4: at 11:24

## 2021-01-01 RX ADMIN — SODIUM ZIRCONIUM CYCLOSILICATE 10 GRAM(S): 10 POWDER, FOR SUSPENSION ORAL at 15:00

## 2021-01-01 RX ADMIN — HEPARIN SODIUM 5000 UNIT(S): 5000 INJECTION INTRAVENOUS; SUBCUTANEOUS at 22:03

## 2021-01-01 RX ADMIN — Medication 650 MILLIGRAM(S): at 06:20

## 2021-01-01 RX ADMIN — Medication 650 MILLIGRAM(S): at 21:29

## 2021-01-01 RX ADMIN — Medication 81 MILLIGRAM(S): at 11:40

## 2021-01-01 RX ADMIN — Medication 650 MILLIGRAM(S): at 05:00

## 2021-01-01 RX ADMIN — Medication 81 MILLIGRAM(S): at 11:47

## 2021-01-01 RX ADMIN — BUMETANIDE 2 MILLIGRAM(S): 0.25 INJECTION INTRAMUSCULAR; INTRAVENOUS at 17:25

## 2021-01-01 RX ADMIN — SODIUM ZIRCONIUM CYCLOSILICATE 10 GRAM(S): 10 POWDER, FOR SUSPENSION ORAL at 07:01

## 2021-01-01 RX ADMIN — ATORVASTATIN CALCIUM 80 MILLIGRAM(S): 80 TABLET, FILM COATED ORAL at 22:14

## 2021-01-01 RX ADMIN — HEPARIN SODIUM 5000 UNIT(S): 5000 INJECTION INTRAVENOUS; SUBCUTANEOUS at 00:15

## 2021-01-01 RX ADMIN — HEPARIN SODIUM 5000 UNIT(S): 5000 INJECTION INTRAVENOUS; SUBCUTANEOUS at 16:45

## 2021-01-01 RX ADMIN — ATORVASTATIN CALCIUM 80 MILLIGRAM(S): 80 TABLET, FILM COATED ORAL at 22:16

## 2021-01-01 RX ADMIN — Medication 5 MILLIGRAM(S): at 21:55

## 2021-01-01 RX ADMIN — Medication 40 MILLIGRAM(S): at 23:06

## 2021-01-01 RX ADMIN — Medication 6 MILLIGRAM(S): at 06:04

## 2021-01-01 RX ADMIN — Medication 81 MILLIGRAM(S): at 11:53

## 2021-01-01 RX ADMIN — ZOLPIDEM TARTRATE 5 MILLIGRAM(S): 10 TABLET ORAL at 22:17

## 2021-01-01 RX ADMIN — HEPARIN SODIUM 7500 UNIT(S): 5000 INJECTION INTRAVENOUS; SUBCUTANEOUS at 21:48

## 2021-01-01 RX ADMIN — Medication 6 MILLIGRAM(S): at 06:28

## 2021-01-01 RX ADMIN — Medication 4: at 11:47

## 2021-01-01 RX ADMIN — Medication 25 MILLIGRAM(S): at 06:19

## 2021-01-01 RX ADMIN — HEPARIN SODIUM 5000 UNIT(S): 5000 INJECTION INTRAVENOUS; SUBCUTANEOUS at 21:27

## 2021-01-01 RX ADMIN — Medication 2: at 17:28

## 2021-01-01 RX ADMIN — HEPARIN SODIUM 5000 UNIT(S): 5000 INJECTION INTRAVENOUS; SUBCUTANEOUS at 06:10

## 2021-01-01 RX ADMIN — AZITHROMYCIN 255 MILLIGRAM(S): 500 TABLET, FILM COATED ORAL at 01:24

## 2021-01-01 RX ADMIN — HEPARIN SODIUM 5000 UNIT(S): 5000 INJECTION INTRAVENOUS; SUBCUTANEOUS at 15:12

## 2021-01-01 RX ADMIN — HEPARIN SODIUM 5000 UNIT(S): 5000 INJECTION INTRAVENOUS; SUBCUTANEOUS at 21:53

## 2021-01-01 RX ADMIN — SODIUM ZIRCONIUM CYCLOSILICATE 10 GRAM(S): 10 POWDER, FOR SUSPENSION ORAL at 22:04

## 2021-01-01 RX ADMIN — Medication 5 MILLILITER(S): at 22:18

## 2021-01-01 RX ADMIN — Medication 5 MILLILITER(S): at 08:23

## 2021-01-01 RX ADMIN — Medication 50 GRAM(S): at 16:00

## 2021-01-01 RX ADMIN — CEFTRIAXONE 100 MILLIGRAM(S): 500 INJECTION, POWDER, FOR SOLUTION INTRAMUSCULAR; INTRAVENOUS at 10:54

## 2021-01-01 RX ADMIN — POLYETHYLENE GLYCOL 3350 17 GRAM(S): 17 POWDER, FOR SOLUTION ORAL at 05:55

## 2021-01-01 RX ADMIN — Medication 5 MILLIGRAM(S): at 21:50

## 2021-01-01 RX ADMIN — POLYETHYLENE GLYCOL 3350 17 GRAM(S): 17 POWDER, FOR SOLUTION ORAL at 18:01

## 2021-01-01 RX ADMIN — POLYETHYLENE GLYCOL 3350 17 GRAM(S): 17 POWDER, FOR SOLUTION ORAL at 18:33

## 2021-01-01 RX ADMIN — SODIUM ZIRCONIUM CYCLOSILICATE 10 GRAM(S): 10 POWDER, FOR SUSPENSION ORAL at 11:41

## 2021-01-01 RX ADMIN — CEFTRIAXONE 100 MILLIGRAM(S): 500 INJECTION, POWDER, FOR SOLUTION INTRAMUSCULAR; INTRAVENOUS at 01:10

## 2021-01-01 RX ADMIN — Medication 650 MILLIGRAM(S): at 21:50

## 2021-01-01 RX ADMIN — HEPARIN SODIUM 5000 UNIT(S): 5000 INJECTION INTRAVENOUS; SUBCUTANEOUS at 06:01

## 2021-01-01 RX ADMIN — HEPARIN SODIUM 5000 UNIT(S): 5000 INJECTION INTRAVENOUS; SUBCUTANEOUS at 22:16

## 2021-01-01 RX ADMIN — Medication 5 MILLILITER(S): at 22:55

## 2021-01-01 RX ADMIN — CHLORHEXIDINE GLUCONATE 1 APPLICATION(S): 213 SOLUTION TOPICAL at 11:25

## 2021-01-01 RX ADMIN — Medication 81 MILLIGRAM(S): at 12:36

## 2021-01-01 RX ADMIN — Medication 5 MILLIGRAM(S): at 22:04

## 2021-01-01 RX ADMIN — ZOLPIDEM TARTRATE 5 MILLIGRAM(S): 10 TABLET ORAL at 22:04

## 2021-01-01 RX ADMIN — ZOLPIDEM TARTRATE 5 MILLIGRAM(S): 10 TABLET ORAL at 22:16

## 2021-01-01 RX ADMIN — SENNA PLUS 2 TABLET(S): 8.6 TABLET ORAL at 22:54

## 2021-01-01 RX ADMIN — Medication 40 MILLIGRAM(S): at 05:37

## 2021-01-01 RX ADMIN — BUMETANIDE 2 MILLIGRAM(S): 0.25 INJECTION INTRAMUSCULAR; INTRAVENOUS at 06:14

## 2021-01-01 RX ADMIN — SODIUM CHLORIDE 50 MILLILITER(S): 5 INJECTION, SOLUTION INTRAVENOUS at 18:01

## 2021-01-01 RX ADMIN — Medication 4: at 12:12

## 2021-01-01 RX ADMIN — ATORVASTATIN CALCIUM 80 MILLIGRAM(S): 80 TABLET, FILM COATED ORAL at 22:01

## 2021-01-01 RX ADMIN — Medication 81 MILLIGRAM(S): at 12:22

## 2021-01-01 RX ADMIN — CHLORHEXIDINE GLUCONATE 1 APPLICATION(S): 213 SOLUTION TOPICAL at 11:55

## 2021-01-01 RX ADMIN — Medication 6: at 17:44

## 2021-01-01 RX ADMIN — HEPARIN SODIUM 5000 UNIT(S): 5000 INJECTION INTRAVENOUS; SUBCUTANEOUS at 13:02

## 2021-01-01 RX ADMIN — Medication 25 MILLIGRAM(S): at 06:03

## 2021-01-01 RX ADMIN — Medication 975 MILLIGRAM(S): at 10:54

## 2021-01-01 RX ADMIN — ZOLPIDEM TARTRATE 5 MILLIGRAM(S): 10 TABLET ORAL at 22:01

## 2021-01-01 RX ADMIN — CHLORHEXIDINE GLUCONATE 1 APPLICATION(S): 213 SOLUTION TOPICAL at 12:06

## 2021-01-01 RX ADMIN — BUMETANIDE 2 MILLIGRAM(S): 0.25 INJECTION INTRAMUSCULAR; INTRAVENOUS at 17:29

## 2021-01-01 RX ADMIN — Medication 6 MILLIGRAM(S): at 05:53

## 2021-01-01 RX ADMIN — HEPARIN SODIUM 5000 UNIT(S): 5000 INJECTION INTRAVENOUS; SUBCUTANEOUS at 13:55

## 2021-01-01 RX ADMIN — Medication 2: at 07:51

## 2021-01-01 RX ADMIN — Medication 650 MILLIGRAM(S): at 05:08

## 2021-01-01 RX ADMIN — SODIUM CHLORIDE 50 MILLILITER(S): 5 INJECTION, SOLUTION INTRAVENOUS at 06:01

## 2021-01-01 RX ADMIN — POLYETHYLENE GLYCOL 3350 17 GRAM(S): 17 POWDER, FOR SOLUTION ORAL at 05:31

## 2021-01-01 RX ADMIN — Medication 650 MILLIGRAM(S): at 06:00

## 2021-01-01 RX ADMIN — HEPARIN SODIUM 5000 UNIT(S): 5000 INJECTION INTRAVENOUS; SUBCUTANEOUS at 12:48

## 2021-01-01 RX ADMIN — Medication 5 MILLILITER(S): at 11:53

## 2021-01-01 RX ADMIN — ATORVASTATIN CALCIUM 80 MILLIGRAM(S): 80 TABLET, FILM COATED ORAL at 21:53

## 2021-01-01 RX ADMIN — Medication 5 MILLILITER(S): at 22:27

## 2021-01-01 RX ADMIN — BUMETANIDE 2 MILLIGRAM(S): 0.25 INJECTION INTRAMUSCULAR; INTRAVENOUS at 17:32

## 2021-01-01 RX ADMIN — POLYETHYLENE GLYCOL 3350 17 GRAM(S): 17 POWDER, FOR SOLUTION ORAL at 05:08

## 2021-01-01 RX ADMIN — SENNA PLUS 2 TABLET(S): 8.6 TABLET ORAL at 21:28

## 2021-01-01 RX ADMIN — BUMETANIDE 2 MILLIGRAM(S): 0.25 INJECTION INTRAMUSCULAR; INTRAVENOUS at 18:01

## 2021-01-01 RX ADMIN — CEFTRIAXONE 100 MILLIGRAM(S): 500 INJECTION, POWDER, FOR SOLUTION INTRAMUSCULAR; INTRAVENOUS at 01:29

## 2021-01-01 RX ADMIN — Medication 100 MILLILITER(S): at 11:16

## 2021-01-01 RX ADMIN — Medication 5 MILLIGRAM(S): at 22:16

## 2021-01-01 RX ADMIN — Medication 650 MILLIGRAM(S): at 13:21

## 2021-01-01 RX ADMIN — Medication 650 MILLIGRAM(S): at 22:03

## 2021-01-01 RX ADMIN — ATORVASTATIN CALCIUM 80 MILLIGRAM(S): 80 TABLET, FILM COATED ORAL at 22:03

## 2021-01-01 RX ADMIN — HEPARIN SODIUM 5000 UNIT(S): 5000 INJECTION INTRAVENOUS; SUBCUTANEOUS at 13:37

## 2021-01-01 RX ADMIN — Medication 25 MILLIGRAM(S): at 05:00

## 2021-01-01 RX ADMIN — HEPARIN SODIUM 5000 UNIT(S): 5000 INJECTION INTRAVENOUS; SUBCUTANEOUS at 05:35

## 2021-01-01 RX ADMIN — SENNA PLUS 2 TABLET(S): 8.6 TABLET ORAL at 22:03

## 2021-01-01 RX ADMIN — Medication 6 MILLIGRAM(S): at 05:35

## 2021-01-01 RX ADMIN — AZITHROMYCIN 255 MILLIGRAM(S): 500 TABLET, FILM COATED ORAL at 00:41

## 2021-01-01 RX ADMIN — ATORVASTATIN CALCIUM 80 MILLIGRAM(S): 80 TABLET, FILM COATED ORAL at 21:27

## 2021-01-01 RX ADMIN — Medication 25 MILLIGRAM(S): at 05:38

## 2021-01-01 RX ADMIN — Medication 40 MILLIGRAM(S): at 06:19

## 2021-01-01 RX ADMIN — Medication 25 MILLIGRAM(S): at 05:08

## 2021-01-01 RX ADMIN — SODIUM ZIRCONIUM CYCLOSILICATE 10 GRAM(S): 10 POWDER, FOR SUSPENSION ORAL at 14:22

## 2021-01-01 RX ADMIN — HEPARIN SODIUM 5000 UNIT(S): 5000 INJECTION INTRAVENOUS; SUBCUTANEOUS at 11:48

## 2021-01-01 RX ADMIN — CEFTRIAXONE 100 MILLIGRAM(S): 500 INJECTION, POWDER, FOR SOLUTION INTRAMUSCULAR; INTRAVENOUS at 00:41

## 2021-01-01 RX ADMIN — Medication 0: at 16:54

## 2021-01-01 RX ADMIN — HEPARIN SODIUM 5000 UNIT(S): 5000 INJECTION INTRAVENOUS; SUBCUTANEOUS at 22:45

## 2021-01-01 RX ADMIN — HEPARIN SODIUM 5000 UNIT(S): 5000 INJECTION INTRAVENOUS; SUBCUTANEOUS at 13:21

## 2021-01-01 RX ADMIN — POLYETHYLENE GLYCOL 3350 17 GRAM(S): 17 POWDER, FOR SOLUTION ORAL at 16:55

## 2021-01-01 RX ADMIN — HEPARIN SODIUM 5000 UNIT(S): 5000 INJECTION INTRAVENOUS; SUBCUTANEOUS at 06:21

## 2021-01-01 RX ADMIN — SODIUM ZIRCONIUM CYCLOSILICATE 10 GRAM(S): 10 POWDER, FOR SUSPENSION ORAL at 13:25

## 2021-01-01 RX ADMIN — HEPARIN SODIUM 5000 UNIT(S): 5000 INJECTION INTRAVENOUS; SUBCUTANEOUS at 06:28

## 2021-01-01 RX ADMIN — AZITHROMYCIN 255 MILLIGRAM(S): 500 TABLET, FILM COATED ORAL at 01:10

## 2021-01-01 RX ADMIN — Medication 81 MILLIGRAM(S): at 12:06

## 2021-01-01 RX ADMIN — Medication 5 MILLIGRAM(S): at 21:28

## 2021-01-01 RX ADMIN — Medication 325 MILLIGRAM(S): at 06:14

## 2021-01-01 RX ADMIN — ZOLPIDEM TARTRATE 5 MILLIGRAM(S): 10 TABLET ORAL at 21:26

## 2021-01-01 RX ADMIN — Medication 5 MILLIGRAM(S): at 21:52

## 2021-01-01 RX ADMIN — Medication 650 MILLIGRAM(S): at 22:54

## 2021-01-01 RX ADMIN — SODIUM ZIRCONIUM CYCLOSILICATE 10 GRAM(S): 10 POWDER, FOR SUSPENSION ORAL at 00:14

## 2021-01-01 RX ADMIN — Medication 25 MILLIGRAM(S): at 05:12

## 2021-01-01 RX ADMIN — SODIUM ZIRCONIUM CYCLOSILICATE 10 GRAM(S): 10 POWDER, FOR SUSPENSION ORAL at 11:40

## 2021-01-01 RX ADMIN — ZOLPIDEM TARTRATE 5 MILLIGRAM(S): 10 TABLET ORAL at 21:52

## 2021-01-01 RX ADMIN — Medication 2: at 09:59

## 2021-01-01 RX ADMIN — ATORVASTATIN CALCIUM 80 MILLIGRAM(S): 80 TABLET, FILM COATED ORAL at 01:14

## 2021-01-01 RX ADMIN — Medication 81 MILLIGRAM(S): at 13:03

## 2021-01-01 RX ADMIN — SODIUM ZIRCONIUM CYCLOSILICATE 10 GRAM(S): 10 POWDER, FOR SUSPENSION ORAL at 12:35

## 2021-01-01 RX ADMIN — HEPARIN SODIUM 5000 UNIT(S): 5000 INJECTION INTRAVENOUS; SUBCUTANEOUS at 22:04

## 2021-01-01 RX ADMIN — Medication 6 MILLIGRAM(S): at 12:15

## 2021-01-01 RX ADMIN — TOCILIZUMAB 100 MILLIGRAM(S): 20 INJECTION, SOLUTION, CONCENTRATE INTRAVENOUS at 15:34

## 2021-01-01 RX ADMIN — CHLORHEXIDINE GLUCONATE 1 APPLICATION(S): 213 SOLUTION TOPICAL at 11:35

## 2021-01-01 RX ADMIN — Medication 5 MILLIGRAM(S): at 21:27

## 2021-01-01 RX ADMIN — ATORVASTATIN CALCIUM 80 MILLIGRAM(S): 80 TABLET, FILM COATED ORAL at 22:46

## 2021-01-01 RX ADMIN — BUMETANIDE 2 MILLIGRAM(S): 0.25 INJECTION INTRAMUSCULAR; INTRAVENOUS at 06:00

## 2021-01-01 RX ADMIN — AZITHROMYCIN 255 MILLIGRAM(S): 500 TABLET, FILM COATED ORAL at 01:47

## 2021-01-01 RX ADMIN — Medication 325 MILLIGRAM(S): at 13:02

## 2021-01-01 RX ADMIN — Medication 5 MILLIGRAM(S): at 21:29

## 2021-01-01 RX ADMIN — Medication 650 MILLIGRAM(S): at 05:22

## 2021-01-01 RX ADMIN — HEPARIN SODIUM 5000 UNIT(S): 5000 INJECTION INTRAVENOUS; SUBCUTANEOUS at 13:26

## 2021-01-01 RX ADMIN — AZITHROMYCIN 255 MILLIGRAM(S): 500 TABLET, FILM COATED ORAL at 00:15

## 2021-01-01 RX ADMIN — Medication 650 MILLIGRAM(S): at 06:28

## 2021-01-01 RX ADMIN — SENNA PLUS 2 TABLET(S): 8.6 TABLET ORAL at 22:46

## 2021-01-01 RX ADMIN — ARGATROBAN 12.7 MICROGRAM(S)/KG/MIN: 50 INJECTION, SOLUTION INTRAVENOUS at 19:03

## 2021-01-01 RX ADMIN — HEPARIN SODIUM 5000 UNIT(S): 5000 INJECTION INTRAVENOUS; SUBCUTANEOUS at 13:00

## 2021-01-01 RX ADMIN — SODIUM ZIRCONIUM CYCLOSILICATE 10 GRAM(S): 10 POWDER, FOR SUSPENSION ORAL at 13:03

## 2021-01-01 RX ADMIN — SODIUM CHLORIDE 50 MILLILITER(S): 5 INJECTION, SOLUTION INTRAVENOUS at 06:09

## 2021-01-01 RX ADMIN — Medication 650 MILLIGRAM(S): at 11:47

## 2021-01-01 RX ADMIN — Medication 6 MILLIGRAM(S): at 05:58

## 2021-01-01 RX ADMIN — HEPARIN SODIUM 5000 UNIT(S): 5000 INJECTION INTRAVENOUS; SUBCUTANEOUS at 06:19

## 2021-01-01 RX ADMIN — Medication 81 MILLIGRAM(S): at 11:43

## 2021-01-01 RX ADMIN — Medication 5 MILLILITER(S): at 08:56

## 2021-01-01 RX ADMIN — HEPARIN SODIUM 5000 UNIT(S): 5000 INJECTION INTRAVENOUS; SUBCUTANEOUS at 14:44

## 2021-01-01 RX ADMIN — Medication 2: at 17:09

## 2021-01-01 RX ADMIN — SODIUM ZIRCONIUM CYCLOSILICATE 10 GRAM(S): 10 POWDER, FOR SUSPENSION ORAL at 22:45

## 2021-01-01 RX ADMIN — Medication 5 MILLIGRAM(S): at 22:01

## 2021-01-01 RX ADMIN — Medication 60 MILLIGRAM(S): at 06:05

## 2021-01-01 RX ADMIN — ZOLPIDEM TARTRATE 5 MILLIGRAM(S): 10 TABLET ORAL at 22:54

## 2021-01-01 RX ADMIN — SODIUM ZIRCONIUM CYCLOSILICATE 10 GRAM(S): 10 POWDER, FOR SUSPENSION ORAL at 05:38

## 2021-01-01 RX ADMIN — REMDESIVIR 500 MILLIGRAM(S): 5 INJECTION INTRAVENOUS at 06:14

## 2021-01-01 RX ADMIN — Medication 12: at 11:34

## 2021-01-01 RX ADMIN — SENNA PLUS 2 TABLET(S): 8.6 TABLET ORAL at 21:55

## 2021-01-01 RX ADMIN — Medication 25 MILLIGRAM(S): at 05:56

## 2021-01-01 RX ADMIN — Medication 2: at 17:16

## 2021-01-01 RX ADMIN — BUMETANIDE 2 MILLIGRAM(S): 0.25 INJECTION INTRAMUSCULAR; INTRAVENOUS at 05:45

## 2021-01-01 RX ADMIN — SENNA PLUS 2 TABLET(S): 8.6 TABLET ORAL at 00:14

## 2021-01-01 RX ADMIN — SODIUM ZIRCONIUM CYCLOSILICATE 10 GRAM(S): 10 POWDER, FOR SUSPENSION ORAL at 22:42

## 2021-01-01 RX ADMIN — HEPARIN SODIUM 5000 UNIT(S): 5000 INJECTION INTRAVENOUS; SUBCUTANEOUS at 22:54

## 2021-01-01 RX ADMIN — Medication 6 MILLIGRAM(S): at 05:11

## 2021-01-01 RX ADMIN — Medication 81 MILLIGRAM(S): at 11:42

## 2021-01-01 RX ADMIN — HEPARIN SODIUM 5000 UNIT(S): 5000 INJECTION INTRAVENOUS; SUBCUTANEOUS at 06:04

## 2021-01-01 RX ADMIN — SODIUM ZIRCONIUM CYCLOSILICATE 10 GRAM(S): 10 POWDER, FOR SUSPENSION ORAL at 11:24

## 2021-01-01 RX ADMIN — Medication 4: at 17:20

## 2021-01-01 RX ADMIN — HEPARIN SODIUM 5000 UNIT(S): 5000 INJECTION INTRAVENOUS; SUBCUTANEOUS at 21:50

## 2021-01-01 RX ADMIN — Medication 6 MILLIGRAM(S): at 05:32

## 2021-01-01 RX ADMIN — Medication 40 MILLIGRAM(S): at 17:50

## 2021-01-01 RX ADMIN — Medication 5 MILLIGRAM(S): at 22:15

## 2021-01-01 RX ADMIN — Medication 10 UNIT(S): at 01:00

## 2021-01-01 RX ADMIN — HEPARIN SODIUM 5000 UNIT(S): 5000 INJECTION INTRAVENOUS; SUBCUTANEOUS at 05:01

## 2021-01-01 RX ADMIN — Medication 80 MILLIGRAM(S): at 12:42

## 2021-01-01 RX ADMIN — POLYETHYLENE GLYCOL 3350 17 GRAM(S): 17 POWDER, FOR SOLUTION ORAL at 17:30

## 2021-01-01 RX ADMIN — Medication 81 MILLIGRAM(S): at 12:47

## 2021-01-01 RX ADMIN — Medication 3 MILLIGRAM(S): at 10:27

## 2021-01-01 RX ADMIN — Medication 5 MILLILITER(S): at 13:21

## 2021-01-01 RX ADMIN — Medication 325 MILLIGRAM(S): at 21:27

## 2021-01-01 RX ADMIN — CEFTRIAXONE 100 MILLIGRAM(S): 500 INJECTION, POWDER, FOR SOLUTION INTRAMUSCULAR; INTRAVENOUS at 01:24

## 2021-01-01 RX ADMIN — CEFTRIAXONE 100 MILLIGRAM(S): 500 INJECTION, POWDER, FOR SOLUTION INTRAMUSCULAR; INTRAVENOUS at 00:16

## 2021-01-01 RX ADMIN — HEPARIN SODIUM 5000 UNIT(S): 5000 INJECTION INTRAVENOUS; SUBCUTANEOUS at 21:52

## 2021-01-01 RX ADMIN — HEPARIN SODIUM 5000 UNIT(S): 5000 INJECTION INTRAVENOUS; SUBCUTANEOUS at 05:38

## 2021-01-01 RX ADMIN — BUMETANIDE 2 MILLIGRAM(S): 0.25 INJECTION INTRAMUSCULAR; INTRAVENOUS at 05:19

## 2021-01-01 RX ADMIN — HEPARIN SODIUM 5000 UNIT(S): 5000 INJECTION INTRAVENOUS; SUBCUTANEOUS at 22:46

## 2021-01-01 RX ADMIN — ATORVASTATIN CALCIUM 80 MILLIGRAM(S): 80 TABLET, FILM COATED ORAL at 22:04

## 2021-01-01 RX ADMIN — Medication 50 MILLILITER(S): at 20:12

## 2021-01-01 RX ADMIN — Medication 6 MILLIGRAM(S): at 06:20

## 2021-01-01 RX ADMIN — HEPARIN SODIUM 5000 UNIT(S): 5000 INJECTION INTRAVENOUS; SUBCUTANEOUS at 13:03

## 2021-01-01 RX ADMIN — Medication 40 MILLIGRAM(S): at 06:11

## 2021-01-01 RX ADMIN — SODIUM ZIRCONIUM CYCLOSILICATE 10 GRAM(S): 10 POWDER, FOR SUSPENSION ORAL at 05:31

## 2021-01-01 RX ADMIN — POLYETHYLENE GLYCOL 3350 17 GRAM(S): 17 POWDER, FOR SOLUTION ORAL at 17:18

## 2021-01-01 RX ADMIN — HEPARIN SODIUM 5000 UNIT(S): 5000 INJECTION INTRAVENOUS; SUBCUTANEOUS at 05:42

## 2021-01-01 RX ADMIN — HEPARIN SODIUM 5000 UNIT(S): 5000 INJECTION INTRAVENOUS; SUBCUTANEOUS at 21:30

## 2021-01-01 RX ADMIN — POLYETHYLENE GLYCOL 3350 17 GRAM(S): 17 POWDER, FOR SOLUTION ORAL at 06:20

## 2021-01-01 RX ADMIN — ZOLPIDEM TARTRATE 5 MILLIGRAM(S): 10 TABLET ORAL at 21:54

## 2021-01-01 RX ADMIN — Medication 650 MILLIGRAM(S): at 06:03

## 2021-01-01 RX ADMIN — SODIUM ZIRCONIUM CYCLOSILICATE 10 GRAM(S): 10 POWDER, FOR SUSPENSION ORAL at 05:09

## 2021-01-01 RX ADMIN — ATORVASTATIN CALCIUM 80 MILLIGRAM(S): 80 TABLET, FILM COATED ORAL at 21:50

## 2021-01-01 RX ADMIN — Medication 5 MILLIGRAM(S): at 22:03

## 2021-01-01 RX ADMIN — Medication 650 MILLIGRAM(S): at 12:47

## 2021-01-01 RX ADMIN — HEPARIN SODIUM 5000 UNIT(S): 5000 INJECTION INTRAVENOUS; SUBCUTANEOUS at 12:46

## 2021-01-01 RX ADMIN — Medication 6 MILLIGRAM(S): at 05:21

## 2021-01-01 RX ADMIN — SENNA PLUS 2 TABLET(S): 8.6 TABLET ORAL at 21:52

## 2021-01-01 RX ADMIN — HEPARIN SODIUM 5000 UNIT(S): 5000 INJECTION INTRAVENOUS; SUBCUTANEOUS at 13:14

## 2021-01-01 RX ADMIN — HEPARIN SODIUM 5000 UNIT(S): 5000 INJECTION INTRAVENOUS; SUBCUTANEOUS at 21:28

## 2021-01-01 RX ADMIN — Medication 4: at 12:27

## 2021-01-01 RX ADMIN — Medication 2: at 11:49

## 2021-01-01 RX ADMIN — Medication 2: at 16:48

## 2021-01-01 RX ADMIN — HEPARIN SODIUM 5000 UNIT(S): 5000 INJECTION INTRAVENOUS; SUBCUTANEOUS at 05:21

## 2021-01-01 RX ADMIN — SODIUM ZIRCONIUM CYCLOSILICATE 10 GRAM(S): 10 POWDER, FOR SUSPENSION ORAL at 13:21

## 2021-01-01 RX ADMIN — Medication 4: at 16:45

## 2021-01-01 RX ADMIN — Medication 2: at 11:34

## 2021-01-01 RX ADMIN — Medication 25 MILLIGRAM(S): at 06:01

## 2021-01-01 RX ADMIN — INSULIN HUMAN 10 UNIT(S): 100 INJECTION, SOLUTION SUBCUTANEOUS at 11:16

## 2021-01-01 RX ADMIN — SODIUM ZIRCONIUM CYCLOSILICATE 10 GRAM(S): 10 POWDER, FOR SUSPENSION ORAL at 12:13

## 2021-01-01 RX ADMIN — CEFEPIME 100 MILLIGRAM(S): 1 INJECTION, POWDER, FOR SOLUTION INTRAMUSCULAR; INTRAVENOUS at 17:51

## 2021-01-01 RX ADMIN — HEPARIN SODIUM 5000 UNIT(S): 5000 INJECTION INTRAVENOUS; SUBCUTANEOUS at 13:36

## 2021-01-01 RX ADMIN — POLYETHYLENE GLYCOL 3350 17 GRAM(S): 17 POWDER, FOR SOLUTION ORAL at 06:02

## 2021-01-01 RX ADMIN — Medication 6 MILLIGRAM(S): at 06:01

## 2021-01-01 RX ADMIN — HEPARIN SODIUM 5000 UNIT(S): 5000 INJECTION INTRAVENOUS; SUBCUTANEOUS at 05:31

## 2021-01-01 RX ADMIN — SODIUM ZIRCONIUM CYCLOSILICATE 10 GRAM(S): 10 POWDER, FOR SUSPENSION ORAL at 11:53

## 2021-01-01 RX ADMIN — Medication 2: at 11:46

## 2021-01-01 RX ADMIN — Medication 4: at 12:53

## 2021-01-01 RX ADMIN — Medication 6 MILLIGRAM(S): at 05:09

## 2021-01-01 RX ADMIN — SODIUM CHLORIDE 75 MILLILITER(S): 9 INJECTION, SOLUTION INTRAVENOUS at 11:59

## 2021-01-01 RX ADMIN — POLYETHYLENE GLYCOL 3350 17 GRAM(S): 17 POWDER, FOR SOLUTION ORAL at 17:07

## 2021-01-01 RX ADMIN — Medication 81 MILLIGRAM(S): at 12:27

## 2021-01-01 RX ADMIN — HEPARIN SODIUM 5000 UNIT(S): 5000 INJECTION INTRAVENOUS; SUBCUTANEOUS at 01:14

## 2021-01-01 RX ADMIN — Medication 60 MILLIGRAM(S): at 05:02

## 2021-01-01 RX ADMIN — CHLORHEXIDINE GLUCONATE 1 APPLICATION(S): 213 SOLUTION TOPICAL at 12:36

## 2021-01-01 RX ADMIN — SODIUM ZIRCONIUM CYCLOSILICATE 10 GRAM(S): 10 POWDER, FOR SUSPENSION ORAL at 00:11

## 2021-01-01 RX ADMIN — HEPARIN SODIUM 5000 UNIT(S): 5000 INJECTION INTRAVENOUS; SUBCUTANEOUS at 06:11

## 2021-01-01 RX ADMIN — Medication 25 MILLIGRAM(S): at 05:57

## 2021-03-19 NOTE — H&P ADULT - NSHPLABSRESULTS_GEN_ALL_CORE
LABS:                        12.5   2.97  )-----------( 119      ( 19 Mar 2021 09:46 )             37.8     03-19    136  |  110  |  75<HH>  ----------------------------<  97  6.1<HH>   |  13<L>  |  2.9<H>    Ca    8.3<L>      19 Mar 2021 09:46    TPro  6.1  /  Alb  3.6  /  TBili  0.9  /  DBili  x   /  AST  85<H>  /  ALT  40  /  AlkPhos  34  03-19    PT/INR - ( 19 Mar 2021 09:46 )   PT: 12.30 sec;   INR: 1.07 ratio         PTT - ( 19 Mar 2021 09:46 )  PTT:33.9 sec      Lactate, Blood: 1.1 mmol/L (03-19-21 @ 09:46)  Troponin T, Serum: 0.18 ng/mL <HH> (03-19-21 @ 09:46)      CARDIAC MARKERS ( 19 Mar 2021 09:46 )  x     / 0.18 ng/mL / x     / x     / x          Cultures:

## 2021-03-19 NOTE — H&P ADULT - HISTORY OF PRESENT ILLNESS
71 yo M with PMHx of CKD (Unknown baseline), HFrEF (On Entresto), HTN, HLD, DM (A1c 6.5 per patient), LGIB secondary to Diverticulitis s/p colon resection (at [facility] about 6.5 years ago) and CAD presenting today with SOB. Patient states he has had progressive SOB x 6 weeks however it has worsened in the last 2 weeks. Patient states he developed chills, and productive cough x 2 days. Endorses diarrhea and diffuse abdominal discomfort x 6 weeks as well. Denies chest pain though    Vital Signs Last 24 Hrs  T(F): 99.5 (19 Mar 2021 12:07), Max: 102.3 (19 Mar 2021 08:53)  HR: 71 (19 Mar 2021 12:07) (71 - 93)  BP: 150/92 (19 Mar 2021 12:07) (136/84 - 150/92)  RR: 24 (19 Mar 2021 13:18) (20 - 24)  SpO2: 100% (19 Mar 2021 13:18) (92% - 100%)    In ED patient was received septic on admission (HR 93, Temp 102.3, RR 20, WBC <2k, COVID +) lactate 1.1  Was hypoxic to 84% with minimal exertion in the stretcher. Patient on high-lina at this time. Patient initially saturating at 96% on 3L NC; however later desatted requiring over 6L NC. Patient now on high-lina. No signs of right heart strain on POCUS.  CXR showed b/l opcities. Was unable to tolerate CTA to r/o PE  Labs are significant for hyperkalemia 6.1, with tall T waves on my read, Calcium gluconate is ordered STAT and insulin push was already given by ER.  Dimer 562, pro-BNP 2718, Trop 0.18, Cr 2.9         69 yo M with PMHx of CKD (Unknown baseline), HFrEF (On Entresto), HTN, HLD, DM (A1c 6.5 per patient), LGIB secondary to Diverticulitis s/p colon resection (at [facility] about 6.5 years ago) and CAD presenting today with SOB. Patient states he has had progressive SOB x 6 weeks however it has worsened in the last 2 weeks. Patient states he developed chills, and productive cough x 2 days. Endorses diarrhea and diffuse abdominal discomfort x 6 weeks as well. Denies chest pain though    Vital Signs Last 24 Hrs  T(F): 99.5 (19 Mar 2021 12:07), Max: 102.3 (19 Mar 2021 08:53)  HR: 71 (19 Mar 2021 12:07) (71 - 93)  BP: 150/92 (19 Mar 2021 12:07) (136/84 - 150/92)  RR: 24 (19 Mar 2021 13:18) (20 - 24)  SpO2: 100% (19 Mar 2021 13:18) (92% - 100%)    In ED patient was received septic on admission (HR 93, Temp 102.3, RR 20, WBC <2k, COVID +) lactate 1.1  Was hypoxic to 84% with minimal exertion in the stretcher. Patient on high-lina at this time. Patient initially saturating at 96% on 3L NC; however later desatted requiring over 6L NC. Patient now on high-lina. No signs of right heart strain on POCUS.  CXR showed b/l opcities. Was unable to tolerate CTA ruled out PE.  Labs are significant for hyperkalemia 6.1, with tall T waves on my read, Calcium gluconate is ordered STAT and insulin push was already given by ER.  Dimer 562, pro-BNP 2718, Trop 0.18, Cr 2.9

## 2021-03-19 NOTE — H&P ADULT - ASSESSMENT
IMPRESSION:    Acute Hypoxemic Resp Failure  2/2 COVID vs fluid overload  Hyperkalemia,  SAGE on CKD  Troponemia      PLAN    CNS: No sedation    HEENT: HOB 30-45 degrees, aspiration precautions. oral care    CARDIAC: I<O, avoid volume overload, lasix 40 bid, Ca gluconate, Echo, Follow Trops    PULMONARY: NIV / HF PRN, Keep POx > 94%.     GASTROENTEROLOGY: PPI ppx. NPO    RENAL: Monitor CMP & UO. Keep K <5, insulin pushes PRN,      INFECTIOUS: check COVID Ab, Inflammatory markers, Dexa 6mg IV qd,     HEMATOLOGY: Iron studies, DVT ppx, va dupex    ENDOCRINOLOGY: Check FS, insulin protocol as needed, TSH,     MUSCULOSKELETAL: Bed rest     IMPRESSION:    Acute Hypoxemic Resp Failure  2/2 COVID-19 vs fluid overload  H/o HFrEF  SAGE on CKD  Troponemia  Hyperkalemia,        PLAN    CNS: No sedation    HEENT: HOB 30-45 degrees, aspiration precautions. oral care    CARDIAC: I<O, avoid volume overload, lasix 40 bid, Ca gluconate, Echo, Follow Trops    PULMONARY: NIV / HF PRN, Keep POx > 94%.     GASTROENTEROLOGY: PPI ppx. NPO    RENAL: Monitor CMP & UO. Keep K <5, insulin pushes PRN,      INFECTIOUS: check COVID Ab, Inflammatory markers, Dexa 6mg IV qd, Follow up Cultures and keep on Empiric Abx    HEMATOLOGY: Iron studies, DVT ppx, va dupex    ENDOCRINOLOGY: Check FS, insulin protocol as needed, TSH,     MUSCULOSKELETAL: Bed rest

## 2021-03-19 NOTE — ED PROVIDER NOTE - ATTENDING CONTRIBUTION TO CARE
71 y/o male with hx of CHF, HTN, dyslipidemia, CKD, NIDDM, CAD presents to ED with dyspnea x 6 weeks, progressively worsening over the past 2 weeks.  Now with cough x 2 days, chills, and diarrhea.  No CP.  PE:  agree with above.  A/P:  Dyspnea, supplemental O2, High suspicion for COVID.  Labs, EKG, CXR and bedside Echo.

## 2021-03-19 NOTE — ED PROVIDER NOTE - NS ED ROS FT
Constitutional: + fevers.   Eyes:  No visual changes, eye pain or discharge.  ENMT:  No sore throat or runny nose.  Cardiac:  +SOB.   Respiratory:  +cough.   GI: +abd pain. +diarrhea.   :  No dysuria, frequency or burning.  MS:  No myalgia, muscle weakness, joint pain or back pain.  Neuro:  No headache or weakness.  No LOC.  Skin:  No skin rash.   Endocrine: No history of thyroid disease or diabetes.

## 2021-03-19 NOTE — ED PROVIDER NOTE - OBJECTIVE STATEMENT
Patient is a 71 yo M w/ hx of CKD (Dr. Fleming), CHF, HTN, HLD, DM, LGIB s/p colon resection, CAD p/w SOB. Patient states he has had progressive SOB x 6 weeks however it has worsened in the last 2 weeks. Patient states he developed chills, and productive cough x 2 days. Endorses diarrhea and diffuse abdominal discomfort x 6 weeks as well. Denies chest pain.

## 2021-03-19 NOTE — ED PROVIDER NOTE - CLINICAL SUMMARY MEDICAL DECISION MAKING FREE TEXT BOX
CRITICAL CARE TIME SPENT: 30 minutes    Patient initially saturating at 96% on 3L NC; however later desaturated requiring over 6L NC. Patient became hypoxic to 84% with minimal exertion in the stretcher.  Increased RR.  Placed on high-lina oxygen. No signs of right heart strain on POCUS.  Approved to ICU.

## 2021-03-19 NOTE — ED PROVIDER NOTE - PHYSICAL EXAMINATION
CONSTITUTIONAL: Well-developed; obese; in no acute distress.   SKIN: warm, dry.  HEAD: Normocephalic; atraumatic.  EYES: PERRL, EOMI, no conjunctival erythema.  ENT: No nasal discharge; airway clear.  NECK: Supple; non tender.  CARD: S1, S2 normal; no murmurs, gallops, or rubs. Regular rate and rhythm.   RESP: tachypneic, increased work of breathing; crackles diffusely.   ABD: soft nondistended, tender in the RLQ.   : No CVA tenderness bilaterally.   EXT: Normal ROM.  No clubbing, cyanosis or edema. DP pulses 2+ bilat; LEs wrapped in gauze day prior to arrival by vascular surgeon.   NEURO: Alert, oriented, grossly unremarkable.  PSYCH: Cooperative, appropriate.
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
## 2021-03-19 NOTE — ED PROVIDER NOTE - CARE PLAN
97.5
Principal Discharge DX:	Acute hypoxemic respiratory failure due to COVID-19  Secondary Diagnosis:	Hyperkalemia  Secondary Diagnosis:	SAGE (acute kidney injury)  Secondary Diagnosis:	Elevated troponin  Secondary Diagnosis:	Fluid overload

## 2021-03-19 NOTE — ED PROVIDER NOTE - PROGRESS NOTE DETAILS
DC: Patient became hypoxic to 84% with minimal exertion in the stretcher. Patient on high-lina at this time. Patient initially saturating at 96% on 3L NC; however later desatted requiring over 6L NC. Patient now on high-lina. No signs of right heart strain on POCUS. Approved to ICU. DC: Unsure if patient will be able to tolerate CT scan.

## 2021-03-20 NOTE — CONSULT NOTE ADULT - ASSESSMENT
IMPRESSION:    Acute hypoxemic respiratory failure  Severe COVI D pneumonia  Possible superimposed bacterial infection  HO HF   Probable SAIDA   SAGE on CKD     PLAN:    CNS: NO depressants     HEENT: Oral care    PULMONARY:  HOB @ 45 degrees.  Aspiration precautions.  Wean O2.  NIV during sleep    CARDIOVASCULAR:  Hold Lasix.  Avoid volume overload     GI: GI prophylaxis.  Feeding.  Bowel regimen     RENAL:  Follow up lytes.  Correct as needed.  Kidney Bladder US     INFECTIOUS DISEASE: Follow up cultures.  FU inflammatory markers.  ID consult appreciated.  COVID Antibodies,      HEMATOLOGICAL:  DVT prophylaxis.      ENDOCRINE:  Follow up FS.  Insulin protocol if needed.    MUSCULOSKELETAL:  Bed rest    MICU for now

## 2021-03-20 NOTE — PROGRESS NOTE ADULT - SUBJECTIVE AND OBJECTIVE BOX
Progress Note  This morning patient was seen and examined at bedside.    Today is hospital day 1d.  Mr. Mcgill is doing fine.   He reports he had a good night sleep.   His shortness of breath has improved a little and he seems comfortable on HFNC 50L 50% during day and BiPAP overnight. His appetite is adequate, and he denies nausea or vomiting. He denies any abdominal pain, diarrhea, or constipation. He is not ambulating but denies any chest pain, palpitations, or light headedness. He denies urinary symptoms (dysuria, urgency, frequency, intermittence).      Vital Signs in the last 24 hours   Vitals Summary T(C): 36.3 (03-20-21 @ 20:00), Max: 36.3 (03-20-21 @ 20:00)  HR: 58 (03-20-21 @ 21:00) (56 - 66)  BP: 107/76 (03-20-21 @ 21:00) (88/65 - 135/92)  RR: 19 (03-20-21 @ 21:00) (13 - 27)  SpO2: 94% (03-20-21 @ 21:00) (92% - 100%)  Vent Data   Intake/ Output   03-19-21 @ 07:01  -  03-20-21 @ 07:00  --------------------------------------------------------  IN: 0 mL / OUT: 450 mL / NET: -450 mL    03-20-21 @ 07:01  -  03-20-21 @ 22:35  --------------------------------------------------------  IN: 0 mL / OUT: 290 mL / NET: -290 mL      Physical Exam  * General Appearance: Alert, cooperative, interactive, well-groomed, oriented to time, place, and person, in no acute distress  * Head: Normocephalic, without obvious abnormality, atraumatic  * Thyroid:  No enlargement/tenderness/nodules; no carotid bruit or JVD  * Lungs: on HFNC during day and BiPAP overnight, Respirations unlabored, Good bilateral air entry, audible crackles bilaterally, no audible wheezes or rhonchi)  * Heart: Regular Rate and Rhythm, normal S1 and S2, no audible murmur, rub, or gallop  * Abdomen: Symmetric, non-distended, no scar, soft, non-tender, bowel sounds active all four quadrants, no masses, no organomegaly (no hepatosplenomegaly)  * Extremities: Extremities normal, atraumatic, no cyanosis, no lower extremity pitting edema bilaterally, adequate dorsalis pedis pulses  * Pulses: 2+ and symmetric all extremities  * Skin: Skin color, texture, turgor normal, no rashes or lesions  * Lymph nodes: Cervical, supraclavicular, and axillary nodes normal  * Neurologic:CNII-XII intact, normal strength, sensation and reflexes throughout      Investigations   Laboratory Workup  - CBC:                        12.3   1.97  )-----------( 132      ( 20 Mar 2021 08:12 )             36.8     - Chemistry:  03-20    134<L>  |  106  |  86<HH>  ----------------------------<  140<H>  5.8<H>   |  12<L>  |  2.9<H>    Ca    8.4<L>      20 Mar 2021 08:12  Phos  6.8     03-20  Mg     2.5     03-20    TPro  5.5<L>  /  Alb  3.1<L>  /  TBili  0.4  /  DBili  x   /  AST  68<H>  /  ALT  36  /  AlkPhos  33  03-20    - Coagulation Studies:  PT/INR - ( 20 Mar 2021 04:30 )   PT: 12.20 sec;   INR: 1.06 ratio         PTT - ( 20 Mar 2021 04:30 )  PTT:33.1 sec  - ABG:    - Cardiac Markers:  CARDIAC MARKERS ( 19 Mar 2021 09:46 )  x     / 0.18 ng/mL / x     / x     / x          Microbiological Workup    Culture - Blood (collected 19 Mar 2021 09:50)  Source: .Blood Blood-Peripheral  Preliminary Report (20 Mar 2021 19:01):    No growth to date.    Culture - Blood (collected 19 Mar 2021 09:50)  Source: .Blood Blood-Peripheral  Preliminary Report (20 Mar 2021 19:01):    No growth to date.      Current Medications  Standing Medications  aspirin enteric coated 81 milliGRAM(s) Oral daily  atorvastatin 80 milliGRAM(s) Oral at bedtime  azithromycin  IVPB 500 milliGRAM(s) IV Intermittent every 24 hours  cefTRIAXone   IVPB 2000 milliGRAM(s) IV Intermittent every 24 hours  chlorhexidine 4% Liquid 1 Application(s) Topical daily  dexAMETHasone  Injectable 6 milliGRAM(s) IV Push daily  heparin   Injectable 5000 Unit(s) SubCutaneous every 8 hours  influenza   Vaccine 0.5 milliLiter(s) IntraMuscular once  metoprolol succinate ER 25 milliGRAM(s) Oral daily  polyethylene glycol 3350 17 Gram(s) Oral every 12 hours  remdesivir  IVPB   IV Intermittent   remdesivir  IVPB 200 milliGRAM(s) IV Intermittent every 24 hours  senna 2 Tablet(s) Oral at bedtime  sodium bicarbonate 325 milliGRAM(s) Oral three times a day  sodium zirconium cyclosilicate 10 Gram(s) Oral every 8 hours    PRN Medications    Singles Doses Administered  (Floorstock) 3 each &lt;see task&gt; GiveOnce  (Floorstock) 1 each &lt;see task&gt; GiveOnce  (Floorstock) 1 each &lt;see task&gt; GiveOnce  (Floorstock) 1 each &lt;see task&gt; GiveOnce  (Floorstock) 1 each &lt;see task&gt; GiveOnce  (Floorstock) 2 each &lt;see task&gt; GiveOnce  acetaminophen   Tablet .. 975 milliGRAM(s) Oral once  azithromycin  IVPB 500 milliGRAM(s) IV Intermittent once  calcium gluconate IVPB 2 Gram(s) IV Intermittent once  cefTRIAXone   IVPB 1000 milliGRAM(s) IV Intermittent Once  dexAMETHasone  Injectable 6 milliGRAM(s) IV Push Once  dextrose 50% Injectable 50 milliLiter(s) IV Push once  dextrose 50% Injectable 100 milliLiter(s) IV Push Once  furosemide   Injectable 40 milliGRAM(s) IV Push Once  furosemide   Injectable 40 milliGRAM(s) IV Push once  insulin regular  human recombinant. 10 Unit(s) IV Push once  insulin regular human recombinant IV Push - Peds 10 Unit(s) IV Push once

## 2021-03-20 NOTE — CONSULT NOTE ADULT - ASSESSMENT
ASSESSMENT  71 yo M with PMHx of CKD (Unknown baseline), HFrEF (On Entresto), HTN, HLD, DM (A1c 6.5 per patient), LGIB secondary to Diverticulitis s/p colon resection (at [facility] about 6.5 years ago) and CAD presenting with dyspnea    IMPRESSION  #Acute hypoxic respiratory failure  #COVID-19  - D-Dimer Assay, Quantitative: 562: Manufacturers recommended Cut off for VTE is 230 ng/ml D-DU ng/mL DDU (03.19.21 @ 09:46)    #HRrEF/CHF Exacerbation  #CKD  #Diverticulitis s/p colon resection  #Obesity BMI (kg/m2): 42.3    RECOMMENDATIONS  - dex 6 mg daily  - follow-up rest of inflammatory markers  - continue antibiotics for now while awaiting procalcitonin   - diuresis per primary team  - if CRP>75, may consider tocilizumab    Please call or message on [employer] Teams if with any questions.  Spectra [phone]   ASSESSMENT  69 yo M with PMHx of CKD (Unknown baseline), HFrEF (On Entresto), HTN, HLD, DM (A1c 6.5 per patient), LGIB secondary to Diverticulitis s/p colon resection (at [facility] about 6.5 years ago) and CAD presenting with dyspnea    IMPRESSION  #Acute hypoxic respiratory failure  #COVID-19  - D-Dimer Assay, Quantitative: 562: Manufacturers recommended Cut off for VTE is 230 ng/ml D-DU ng/mL DDU (03.19.21 @ 09:46)    #HRrEF/CHF Exacerbation  #CKD  #Diverticulitis s/p colon resection  #Obesity BMI (kg/m2): 42.3    Creatinine, Serum: 2.9 mg/dL (03.20.21 @ 04:30)  Weight (kg): 141.4 (20 Mar 2021 00:00)  CrCl 47    RECOMMENDATIONS  - unclear onset of symptoms - Shortness of breath for 2 weeks, but chills and rhinorrhea within the past week  - can give  followed by 100 mg daily for 5 days, trend Cr  - dex 6 mg daily  - follow-up rest of inflammatory markers  - continue antibiotics for now while awaiting procalcitonin   - diuresis per primary team  - if CRP>75, may consider tocilizumab, but would give adequate trial of diuresis first     Please call or message on [employer] Teams if with any questions.  Spectra [phone]

## 2021-03-20 NOTE — PROGRESS NOTE ADULT - ASSESSMENT
Assessment and Plan  Case of a 70 year old male patient with CKD, HFrEF, HTN, DM, CAD, and DM who presented on 03/19 with few days of shortness of breath, found to have COVID pneumonia, admitted for hypoxic RF secondary to COVID pneumonia. Currently hemodynamically stable.      Acute Hypoxic Respiratory Failure Secondary to COVID Pneumonia  * SARS-COV2: positive 03/19  * COVID Antibody ordered for 03/21  * Chest X Ray: 03/19 bilateral opacities  * CT angio chest AP IC PE Protocol (03/19) no PE, bilateral groundglass opacities, enlarged thoracic lymph nodes  * Markers as below    - Infectious Disease and Pulmonary team are on board  - Monitor for fever: afebrile in last 24 hours  - Trend WBC: 03/20 1.97  - Monitor SaO2 and Oxygen Requirements: 03/20 HFNC 50L 50% with S92-98%. Will place on BiPAP overnight  - Follow up Chest X Ray on 03/21. Last Chest X Ray on 03/20 stable bilateral opacities  - Trend Inflammatory Markers:  --> D-dimer 03/19 562-> follow up repeat on 03/21  --> Procalcitonin 03/19 0.67-> follow up repeat on 03/21  --> C-reactive protein 03/19 43-> follow up repeat on 03/21  --> LDH -> follow up repeat on  --> Ferritin follow up level 03/21  --> Fibrinogen -> follow up repeat on  - Follow up blood cultures 03/19 x2 received  - COVID therapy:  --> Will start a 5 day course of IV Remdesevir 200mg followed by 100mg QD 03/20  --> Consider IV Tocilizumab in case CRP >75.  --> Started on IV Dexamethasone 6mg QD on 03/19 for 10 days  - Antibiotics:  --> Continue IV Azithromcyin 500mg QD (03/19) and IV Rocephin 2g QD (03/19) pending procalcitonin per ID  - Anticoagulation Heparin 5000 Q8h. 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 S/P IV Lasix 40mg x2 doses 03/19. Keep off IV fluid hydration for now since euvolemic  - Monitor K and HCO3 QD. Started NaHCO3 325mg TID 03/20 and Lokelma 10mg Q8h 03/19  - Order bladder scan  - Follow up ultrasound renal (ordered 03/20)  - Order urinalysis (ordered pending collection)      HTN  * Home meds: Entresto 97, 103mg BID, Lasix 20mg 2x/week, Toprol 25mg QD  - Monitor BP   - Continue Toprol 25mg QD      DM II  * Hba1c 6.9 03/20  * No home meds  - Monitor POCT premeals and at bedtime  - No need for insulin for now      Dyslipidemia  CAD  * Lipid profile 03/20 chol 87, TG 64, HDL 41, LDL 38  * Home meds: Aspirin 81mg QD, Atorvastatin 80mg QD, Entresto 90, 103mg BID, Ezetimibe 10mg QD, Toprol 25mg QD  * ECG 03/19 NSR, L axis deviation, LAFB  * Troponin 03/19 0.18 -> repeat 03/21    -Trend troponin 03/19 0.18 -> repeat 03/21  - Continue Aspirin 81mg QD  - Continue Atorvastatin 80mg QD  - Continue Toprol 25mg QD       HFrEF  * Last TTE not present in chart  * Home meds: Lasix 20mg 2x/week, Entresto 97,103mg BID, Toprol 25mg QD  * Pro-BNP (03/19) 2718  * TSH (03/20) received  - Monitor accurate intake/output  - Monitor daily weight   - Monitor SaO2 and Oxygen Requirements: 03/20 HFNC 50L 50% with S92-98%. Will place on BiPAP overnight  - Follow up Chest X Ray on 03/21. Last Chest X Ray on 03/20 stable bilateral opacities  - Hold diuretics for few days given recent exposure to contrast. S/P IV Lasix 40mg x2 doses 03/19. Keep off IV fluid hydration for now since euvolemic      Others  - DVT Prophylaxis Heparin 5000 Q8h. Duplex venous LE 03/19 no DVT  - GI Prophylaxis: not on Pantoprazole 40mg PO QD  - Diet: DASH/TLC  - Code Status: Full      Barriers to learning: NO  Discharge Planning: Patient will be discharged once stable   Plan was communicated with medical team and patient      Stacy Valerio MD  PGY - 1 Internal Medicine   [facility]

## 2021-03-20 NOTE — CONSULT NOTE ADULT - SUBJECTIVE AND OBJECTIVE BOX
patient known to me and followed in the office (last seen 3/18/2021).  has CKD Stage 3 possibly secondary to diabetes vs. morbid obesity.  patient also has h/o osteoarthritis, HTN, CHF - systolic dysfunction, followed by Dr. Bojorquez.  patient was very SOB when he came to e office on Thurs and I advised him to go to hospital.  when arrived at the hospital he had T 102.3 and was positive for COVID.  found to have severe COVID pneumonia an dacute respiratory failure.   When I saw patient today he was on high flow oxygen, but feeling better, awake, alert, oriented x 3 and not c/o SOB.  Vital Signs Last 24 Hrs  T(C): 36.2 (20 Mar 2021 12:00), Max: 36.5 (19 Mar 2021 16:48)  T(F): 97.1 (20 Mar 2021 12:00), Max: 97.7 (19 Mar 2021 16:48)  HR: 60 (20 Mar 2021 14:00) (54 - 66)  BP: 98/55 (20 Mar 2021 14:00) (77/52 - 135/92)  BP(mean): 69 (20 Mar 2021 14:00) (60 - 104)  RR: 17 (20 Mar 2021 14:00) (13 - 27)  SpO2: 96% (20 Mar 2021 14:00) (95% - 100%)     conj pink, no jaundice  neck veins not visible - thick neck.  neck supple.  lungs good air entry bilaterally.  heart regular rhythm.   abd. obese.  bs pos. no palpable bladder  extremities 1+ edema                   12.3   1.97  )-----------( 132      ( 20 Mar 2021 08:12 )             36.8   03-20    134<L>  |  106  |  86<HH>  ----------------------------<  140<H>  5.8<H>   |  12<L>  |  2.9<H>    Ca    8.4<L>      20 Mar 2021 08:12  Phos  6.8     03-20  Mg     2.5     03-20    TPro  5.5<L>  /  Alb  3.1<L>  /  TBili  0.4  /  DBili  x   /  AST  68<H>  /  ALT  36  /  AlkPhos  33  03-20

## 2021-03-20 NOTE — CONSULT NOTE ADULT - ASSESSMENT
Acute severe COVID pneumonia  Acute hypoxemic respiratory failure  Acute on CKD (baseline serum creatinine 1.5 mg/dL)  looks like type 4 RTA - need to r/o obstruction of hte urinary tract vs. COVID vs. contrast as reason for acute kidney injury  systolic CHF - usually NYHA Class 2-3 now Class 4    Suggest:  check bladder scan and renal sonogram  u/a - urine electrolytes  right now patient looks more euvolemic - volume overloaded would not give IVF and agree with  holdin diuretic for a few days given recent contrast  monitor K and bicarb - would start on po Na bicarb 325 mg po tid and monitor bicarb  check VBG to make sure he has metabolic acidosis vs. respiratory alkalosis given hypoxemia and respiratory failure

## 2021-03-20 NOTE — CONSULT NOTE ADULT - SUBJECTIVE AND OBJECTIVE BOX
HAMMAD KING  70y, Male  Allergy: ACE inhibitors (Angioedema)      CHIEF COMPLAINT: SOB (19 Mar 2021 14:40)      LOS  1d    HPI:  69 yo M with PMHx of CKD (Unknown baseline), HFrEF (On Entresto), HTN, HLD, DM (A1c 6.5 per patient), LGIB secondary to Diverticulitis s/p colon resection (at [facility] about 6.5 years ago) and CAD presenting today with SOB. Patient states he has had progressive SOB x 6 weeks however it has worsened in the last 2 weeks. Patient states he developed chills, and productive cough x 2 days. Endorses diarrhea and diffuse abdominal discomfort x 6 weeks as well. Denies chest pain though    Vital Signs Last 24 Hrs  T(F): 99.5 (19 Mar 2021 12:07), Max: 102.3 (19 Mar 2021 08:53)  HR: 71 (19 Mar 2021 12:07) (71 - 93)  BP: 150/92 (19 Mar 2021 12:07) (136/84 - 150/92)  RR: 24 (19 Mar 2021 13:18) (20 - 24)  SpO2: 100% (19 Mar 2021 13:18) (92% - 100%)    In ED patient was received septic on admission (HR 93, Temp 102.3, RR 20, WBC <2k, COVID +) lactate 1.1  Was hypoxic to 84% with minimal exertion in the stretcher. Patient on high-lina at this time. Patient initially saturating at 96% on 3L NC; however later desatted requiring over 6L NC. Patient now on high-lina. No signs of right heart strain on POCUS.  CXR showed b/l opcities. Was unable to tolerate CTA ruled out PE.  Labs are significant for hyperkalemia 6.1, with tall T waves on my read, Calcium gluconate is ordered STAT and insulin push was already given by ER.  Dimer 562, pro-BNP 2718, Trop 0.18, Cr 2.9         (19 Mar 2021 14:40)      INFECTIOUS DISEASE HISTORY:  History as above  On [facility]  CXR with bilateral infiltrates  Febrile on admission.    PAST MEDICAL & SURGICAL HISTORY:  Diabetes    Dyslipidemia    Hypertension    Heart failure    Diverticulitis    S/P partial resection of colon        FAMILY HISTORY  FH: heart disease    FH: hyperlipidemia    FH: diabetes mellitus        SOCIAL HISTORY  Social History:  Former smoker, quit 18 years ago, 40 pack year hisotry  denies alcohol and illicit drug use (19 Mar 2021 14:40)        ROS  General: Denies rigors, nightsweats  HEENT: Denies headache, rhinorrhea, sore throat, eye pain  CV: Denies CP, palpitations  PULM: Denies wheezing, hemoptysis  GI: Denies hematemesis, hematochezia, melena  : Denies discharge, hematuria  MSK: Denies arthralgias, myalgias  SKIN: Denies rash, lesions  NEURO: Denies paresthesias, weakness  PSYCH: Denies depression, anxiety    VITALS:  T(F): 96.1, Max: 102.3 (03-19-21 @ 08:53)  HR: 66  BP: 135/92  RR: 27Vital Signs Last 24 Hrs  T(C): 35.6 (20 Mar 2021 04:00), Max: 39.1 (19 Mar 2021 08:53)  T(F): 96.1 (20 Mar 2021 04:00), Max: 102.3 (19 Mar 2021 08:53)  HR: 66 (20 Mar 2021 06:00) (54 - 93)  BP: 135/92 (20 Mar 2021 06:00) (77/52 - 150/92)  BP(mean): 104 (20 Mar 2021 06:00) (60 - 104)  RR: 27 (20 Mar 2021 06:00) (13 - 27)  SpO2: 95% (20 Mar 2021 06:00) (92% - 100%)    PHYSICAL EXAM:  Gen: NAD, resting in bed  HEENT: Normocephalic, atraumatic  Neck: supple, no lymphadenopathy  CV: Regular rate & regular rhythm  Lungs: decreased BS at bases, no fremitus  Abdomen: Soft, BS present  Ext: Warm, well perfused  Neuro: non focal, awake  Skin: no rash, no erythema  Lines: no phlebitis    TESTS & MEASUREMENTS:                        12.4   1.74  )-----------( 129      ( 20 Mar 2021 04:30 )             36.6     03-19    136  |  110  |  75<HH>  ----------------------------<  97  6.1<HH>   |  13<L>  |  2.9<H>    Ca    8.3<L>      19 Mar 2021 09:46    TPro  6.1  /  Alb  3.6  /  TBili  0.9  /  DBili  x   /  AST  85<H>  /  ALT  40  /  AlkPhos  34  03-19    eGFR if Non African American: 21 mL/min/1.73M2 (03-19-21 @ 09:46)  eGFR if : 24 mL/min/1.73M2 (03-19-21 @ 09:46)    LIVER FUNCTIONS - ( 19 Mar 2021 09:46 )  Alb: 3.6 g/dL / Pro: 6.1 g/dL / ALK PHOS: 34 U/L / ALT: 40 U/L / AST: 85 U/L / GGT: x                 Blood Gas Venous - Lactate: 0.7 mmoL/L (03-19-21 @ 14:06)  Lactate, Blood: 1.1 mmol/L (03-19-21 @ 09:46)      INFECTIOUS DISEASES TESTING  COVID-19 PCR: Detected (03-19-21 @ 09:50)      RADIOLOGY & ADDITIONAL TESTS:  I have personally reviewed the last Chest xray  CXR      CT  CT Angio Chest PE Protocol w/ IV Cont:   EXAM:  CT ABDOMEN AND PELVIS IC        EXAM:  CT ANGIO CHEST PE PROTOCOL IC            PROCEDURE DATE:  03/19/2021            INTERPRETATION:  Clinical History / Reason for exam: Shortness of breath and abdominal discomfort.    CT evaluation of thechest was performed as a CT angiogram to evaluate for pulmonary embolism after 100 cc of Isovue-370 intravenous contrast. Delayed images were then obtained of the abdomen pelvis. Sagittal, coronal and MIP reformats were performed. Study is compared to prior abdominal pelvic CT dated 1/22/2019. No oral contrast was given    The upper chest is incompletely imaged.    There is no evidence of pulmonary embolism.    There are mildly enlarged prevascular and paratracheal lymph nodes measuring up to 1.3cm in shortest dimension.    The thoracic aorta is normal in caliber. There is mild atherosclerosis. The heart size within normal limits. There is no pericardial effusion. There is contour deformity to the pulmonary artery as it leaves the right ventricle on image 39 of series 3 with pooling of contrast. This may represent a right coronary artery to pulmonary artery fistula. Further evaluation with cardiac CTA is recommended for complete characterization    The central tracheobronchial tree is patent. There are bilateral patchy groundglass opacities. There is no pneumothorax or pleural effusion.    The liver, Adrenal glands, spleen and pancreas are unremarkable. The gallbladder is present.    There is no hydronephrosis. There are subcentimeter renal hypodensities, too small to characterize. The bladder is collapsed    There is abdominal pelvic atherosclerosis. The abdominal aorta is normal in caliber.  There is no abdominal or pelvic lymphadenopathy or free fluid.    There is no evidence of free air or obstruction. There is diffuse diverticulosis. There appears to be suture line in the region of the cecum.    The osseous structures demonstrates degenerative changes.    Impression    Bilateral groundglass opacities, which can be seen with infections including atypical infection such as viral including Covid.    No evidence of pulmonary embolism    Contour deformity to the pulmonary artery as it leaves the right ventricle with pooling of contrast. 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    Further evaluation with cardiac CTA is recommended for complete characterization    Mildly enlarged thoracic lymph nodes. Short-term interval follow-up chest CT in one month is recommended to demonstrate stability    Spoke with JONEL AHN on 3/19/2021 2:42 PM with readback.              SUZANNE BARRAGAN MD; Attending Radiologist  This document has been electronically signed. Mar 19 2021  2:44PM (03-19-21 @ 13:45)      CARDIOLOGY TESTING  12 Lead ECG:   Ventricular Rate 85 BPM    Atrial Rate 85 BPM    P-R Interval 164 ms    QRS Duration 80 ms    Q-T Interval 358 ms    QTC Calculation(Bazett) 426 ms    P Axis 49 degrees    R Axis -30 degrees    T Axis 69 degrees    Diagnosis Line Normal sinus rhythm  Left axis deviation  Left anterior fascicular block  Abnormal ECG    Confirmed by OZZIE ANDERSON MD ([id number]) on 3/19/2021 10:59:33 AM (03-19-21 @ 09:01)      MEDICATIONS  aspirin enteric coated 81 Oral daily  atorvastatin 80 Oral at bedtime  azithromycin  IVPB 500 IV Intermittent every 24 hours  cefTRIAXone   IVPB 2000 IV Intermittent every 24 hours  chlorhexidine 4% Liquid 1 Topical daily  dexAMETHasone  Injectable 6 IV Push daily  furosemide   Injectable 40 IV Push every 12 hours  heparin   Injectable 5000 SubCutaneous every 8 hours  influenza   Vaccine 0.5 IntraMuscular once  metoprolol succinate ER 25 Oral daily  sodium zirconium cyclosilicate 10 Oral every 8 hours      Weight  Weight (kg): 141.4 (03-20-21 @ 00:00)    ANTIBIOTICS:  azithromycin  IVPB 500 milliGRAM(s) IV Intermittent every 24 hours  cefTRIAXone   IVPB 2000 milliGRAM(s) IV Intermittent every 24 hours      ALLERGIES:  ACE inhibitors (Angioedema)       HAMMAD KING  70y, Male  Allergy: ACE inhibitors (Angioedema)      CHIEF COMPLAINT: SOB (19 Mar 2021 14:40)      LOS  1d    HPI:  69 yo M with PMHx of CKD (Unknown baseline), HFrEF (On Entresto), HTN, HLD, DM (A1c 6.5 per patient), LGIB secondary to Diverticulitis s/p colon resection (at [facility] about 6.5 years ago) and CAD presenting today with SOB. Patient states he has had progressive SOB x 6 weeks however it has worsened in the last 2 weeks. Patient states he developed chills, and productive cough x 2 days. Endorses diarrhea and diffuse abdominal discomfort x 6 weeks as well. Denies chest pain though    Vital Signs Last 24 Hrs  T(F): 99.5 (19 Mar 2021 12:07), Max: 102.3 (19 Mar 2021 08:53)  HR: 71 (19 Mar 2021 12:07) (71 - 93)  BP: 150/92 (19 Mar 2021 12:07) (136/84 - 150/92)  RR: 24 (19 Mar 2021 13:18) (20 - 24)  SpO2: 100% (19 Mar 2021 13:18) (92% - 100%)    In ED patient was received septic on admission (HR 93, Temp 102.3, RR 20, WBC <2k, COVID +) lactate 1.1  Was hypoxic to 84% with minimal exertion in the stretcher. Patient on high-lina at this time. Patient initially saturating at 96% on 3L NC; however later desatted requiring over 6L NC. Patient now on high-lina. No signs of right heart strain on POCUS.  CXR showed b/l opcities. Was unable to tolerate CTA ruled out PE.  Labs are significant for hyperkalemia 6.1, with tall T waves on my read, Calcium gluconate is ordered STAT and insulin push was already given by ER.  Dimer 562, pro-BNP 2718, Trop 0.18, Cr 2.9         (19 Mar 2021 14:40)      INFECTIOUS DISEASE HISTORY:  History as above  On [facility]  CXR with bilateral infiltrates  Febrile on admission.  Last tested two weeks ago and was negative.   Has been having diarrhea for 6 weeks, reports rhinorrhea for 1 week.   Given IV lasix 40 but does not feel he is completely emptying his bladder   No abdominal pain, nausea, vomiting    PAST MEDICAL & SURGICAL HISTORY:  Diabetes    Dyslipidemia    Hypertension    Heart failure    Diverticulitis    S/P partial resection of colon        FAMILY HISTORY  FH: heart disease    FH: hyperlipidemia    FH: diabetes mellitus        SOCIAL HISTORY  Social History:  Former smoker, quit 18 years ago, 40 pack year hisotry  denies alcohol and illicit drug use (19 Mar 2021 14:40)        ROS  General: Denies rigors, nightsweats  HEENT: Denies headache, rhinorrhea, sore throat, eye pain  CV: Denies CP, palpitations  PULM: Denies wheezing, hemoptysis  GI: Denies hematemesis, hematochezia, melena  : Denies discharge, hematuria  MSK: Denies arthralgias, myalgias  SKIN: Denies rash, lesions  NEURO: Denies paresthesias, weakness  PSYCH: Denies depression, anxiety    VITALS:  T(F): 96.1, Max: 102.3 (03-19-21 @ 08:53)  HR: 66  BP: 135/92  RR: 27Vital Signs Last 24 Hrs  T(C): 35.6 (20 Mar 2021 04:00), Max: 39.1 (19 Mar 2021 08:53)  T(F): 96.1 (20 Mar 2021 04:00), Max: 102.3 (19 Mar 2021 08:53)  HR: 66 (20 Mar 2021 06:00) (54 - 93)  BP: 135/92 (20 Mar 2021 06:00) (77/52 - 150/92)  BP(mean): 104 (20 Mar 2021 06:00) (60 - 104)  RR: 27 (20 Mar 2021 06:00) (13 - 27)  SpO2: 95% (20 Mar 2021 06:00) (92% - 100%)    PHYSICAL EXAM:  Gen: NAD, resting in bed  HEENT: Normocephalic, atraumatic  Neck: supple, no lymphadenopathy  CV: Regular rate & regular rhythm  Lungs: decreased BS at bases, no fremitus  Abdomen: Soft, BS present  Ext: Warm, well perfused  Neuro: non focal, awake  Skin: no rash, no erythema  Lines: no phlebitis    TESTS & MEASUREMENTS:                        12.4   1.74  )-----------( 129      ( 20 Mar 2021 04:30 )             36.6     03-19    136  |  110  |  75<HH>  ----------------------------<  97  6.1<HH>   |  13<L>  |  2.9<H>    Ca    8.3<L>      19 Mar 2021 09:46    TPro  6.1  /  Alb  3.6  /  TBili  0.9  /  DBili  x   /  AST  85<H>  /  ALT  40  /  AlkPhos  34  03-19    eGFR if Non African American: 21 mL/min/1.73M2 (03-19-21 @ 09:46)  eGFR if : 24 mL/min/1.73M2 (03-19-21 @ 09:46)    LIVER FUNCTIONS - ( 19 Mar 2021 09:46 )  Alb: 3.6 g/dL / Pro: 6.1 g/dL / ALK PHOS: 34 U/L / ALT: 40 U/L / AST: 85 U/L / GGT: x                 Blood Gas Venous - Lactate: 0.7 mmoL/L (03-19-21 @ 14:06)  Lactate, Blood: 1.1 mmol/L (03-19-21 @ 09:46)      INFECTIOUS DISEASES TESTING  COVID-19 PCR: Detected (03-19-21 @ 09:50)      RADIOLOGY & ADDITIONAL TESTS:  I have personally reviewed the last Chest xray  CXR      CT  CT Angio Chest PE Protocol w/ IV Cont:   EXAM:  CT ABDOMEN AND PELVIS IC        EXAM:  CT ANGIO CHEST PE PROTOCOL IC            PROCEDURE DATE:  03/19/2021            INTERPRETATION:  Clinical History / Reason for exam: Shortness of breath and abdominal discomfort.    CT evaluation of thechest was performed as a CT angiogram to evaluate for pulmonary embolism after 100 cc of Isovue-370 intravenous contrast. Delayed images were then obtained of the abdomen pelvis. Sagittal, coronal and MIP reformats were performed. Study is compared to prior abdominal pelvic CT dated 1/22/2019. No oral contrast was given    The upper chest is incompletely imaged.    There is no evidence of pulmonary embolism.    There are mildly enlarged prevascular and paratracheal lymph nodes measuring up to 1.3cm in shortest dimension.    The thoracic aorta is normal in caliber. There is mild atherosclerosis. The heart size within normal limits. There is no pericardial effusion. There is contour deformity to the pulmonary artery as it leaves the right ventricle on image 39 of series 3 with pooling of contrast. This may represent a right coronary artery to pulmonary artery fistula. Further evaluation with cardiac CTA is recommended for complete characterization    The central tracheobronchial tree is patent. There are bilateral patchy groundglass opacities. There is no pneumothorax or pleural effusion.    The liver, Adrenal glands, spleen and pancreas are unremarkable. The gallbladder is present.    There is no hydronephrosis. There are subcentimeter renal hypodensities, too small to characterize. The bladder is collapsed    There is abdominal pelvic atherosclerosis. The abdominal aorta is normal in caliber.  There is no abdominal or pelvic lymphadenopathy or free fluid.    There is no evidence of free air or obstruction. There is diffuse diverticulosis. There appears to be suture line in the region of the cecum.    The osseous structures demonstrates degenerative changes.    Impression    Bilateral groundglass opacities, which can be seen with infections including atypical infection such as viral including Covid.    No evidence of pulmonary embolism    Contour deformity to the pulmonary artery as it leaves the right ventricle with pooling of contrast. 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    Further evaluation with cardiac CTA is recommended for complete characterization    Mildly enlarged thoracic lymph nodes. Short-term interval follow-up chest CT in one month is recommended to demonstrate stability    Spoke with JONEL AHN on 3/19/2021 2:42 PM with readback.              SUZANNE BARRAGAN MD; Attending Radiologist  This document has been electronically signed. Mar 19 2021  2:44PM (03-19-21 @ 13:45)      CARDIOLOGY TESTING  12 Lead ECG:   Ventricular Rate 85 BPM    Atrial Rate 85 BPM    P-R Interval 164 ms    QRS Duration 80 ms    Q-T Interval 358 ms    QTC Calculation(Bazett) 426 ms    P Axis 49 degrees    R Axis -30 degrees    T Axis 69 degrees    Diagnosis Line Normal sinus rhythm  Left axis deviation  Left anterior fascicular block  Abnormal ECG    Confirmed by OZZIE ANDERSON MD ([id number]) on 3/19/2021 10:59:33 AM (03-19-21 @ 09:01)      MEDICATIONS  aspirin enteric coated 81 Oral daily  atorvastatin 80 Oral at bedtime  azithromycin  IVPB 500 IV Intermittent every 24 hours  cefTRIAXone   IVPB 2000 IV Intermittent every 24 hours  chlorhexidine 4% Liquid 1 Topical daily  dexAMETHasone  Injectable 6 IV Push daily  furosemide   Injectable 40 IV Push every 12 hours  heparin   Injectable 5000 SubCutaneous every 8 hours  influenza   Vaccine 0.5 IntraMuscular once  metoprolol succinate ER 25 Oral daily  sodium zirconium cyclosilicate 10 Oral every 8 hours      Weight  Weight (kg): 141.4 (03-20-21 @ 00:00)    ANTIBIOTICS:  azithromycin  IVPB 500 milliGRAM(s) IV Intermittent every 24 hours  cefTRIAXone   IVPB 2000 milliGRAM(s) IV Intermittent every 24 hours      ALLERGIES:  ACE inhibitors (Angioedema)

## 2021-03-20 NOTE — CONSULT NOTE ADULT - SUBJECTIVE AND OBJECTIVE BOX
Patient is a 70y old  Male who presents with a chief complaint of SOB (20 Mar 2021 06:39)      HPI:  71 yo M with PMHx of CKD (Unknown baseline), HFrEF (On Entresto), HTN, HLD, DM (A1c 6.5 per patient), LGIB secondary to Diverticulitis s/p colon resection (at [facility] about 6.5 years ago) and CAD presenting today with SOB. Patient states he has had progressive SOB x 6 weeks however it has worsened in the last 2 weeks. Patient states he developed chills, and productive cough x 2 days. Endorses diarrhea and diffuse abdominal discomfort x 6 weeks as well. Denies chest pain though    Vital Signs Last 24 Hrs  T(F): 99.5 (19 Mar 2021 12:07), Max: 102.3 (19 Mar 2021 08:53)  HR: 71 (19 Mar 2021 12:07) (71 - 93)  BP: 150/92 (19 Mar 2021 12:07) (136/84 - 150/92)  RR: 24 (19 Mar 2021 13:18) (20 - 24)  SpO2: 100% (19 Mar 2021 13:18) (92% - 100%)    In ED patient was received septic on admission (HR 93, Temp 102.3, RR 20, WBC <2k, COVID +) lactate 1.1  Was hypoxic to 84% with minimal exertion in the stretcher. Patient on high-lina at this time. Patient initially saturating at 96% on 3L NC; however later desatted requiring over 6L NC. Patient now on high-lina. No signs of right heart strain on POCUS.  CXR showed b/l opcities. Was unable to tolerate CTA ruled out PE.  Labs are significant for hyperkalemia 6.1, with tall T waves on my read, Calcium gluconate is ordered STAT and insulin push was already given by ER.  Dimer 562, pro-BNP 2718, Trop 0.18, Cr 2.9         (19 Mar 2021 14:40)      PAST MEDICAL & SURGICAL HISTORY:  Diabetes    Dyslipidemia    Hypertension    Heart failure    Diverticulitis    S/P partial resection of colon        SOCIAL HX:   Smoking          Former 40py                ETOH       Occ                     Other    FAMILY HISTORY:  FH: heart disease    FH: hyperlipidemia    FH: diabetes mellitus    :  No known cardiovacular family hisotry     Review Of Systems:     All ROS are negative except per HPI       Allergies    ACE inhibitors (Angioedema)    Intolerances          PHYSICAL EXAM    ICU Vital Signs Last 24 Hrs  T(C): 35.6 (20 Mar 2021 04:00), Max: 39.1 (19 Mar 2021 08:53)  T(F): 96.1 (20 Mar 2021 04:00), Max: 102.3 (19 Mar 2021 08:53)  HR: 66 (20 Mar 2021 06:00) (54 - 93)  BP: 135/92 (20 Mar 2021 06:00) (77/52 - 150/92)  BP(mean): 104 (20 Mar 2021 06:00) (60 - 104)  ABP: --  ABP(mean): --  RR: 27 (20 Mar 2021 06:00) (13 - 27)  SpO2: 95% (20 Mar 2021 06:00) (92% - 100%)      CONSTITUTIONAL:  Well nourished.   NAD    ENT:   Airway patent,   Mouth with normal mucosa.   No thrush      CARDIAC:   Normal rate,   Regular rhythm.     edema      Vascular:   normal systolic impulse  no bruits    RESPIRATORY:   No wheezing  Bilateral BS   Not tachypneic,  No use of accessory muscles    GASTROINTESTINAL:  Abdomen soft,   Non-tender,   No guarding,   + BS      NEUROLOGICAL:   Alert and oriented   No motor deficits.    SKIN:   Skin normal color for race,   No evidence of rash.      HEME LYMPH: .  No cervical  lymphadenopathy.  No inguinal lymphadenopathy            03-19-21 @ 07:01  -  03-20-21 @ 07:00  --------------------------------------------------------  IN:  Total IN: 0 mL    OUT:    Voided (mL): 450 mL  Total OUT: 450 mL    Total NET: -450 mL          LABS:                          12.4   1.74  )-----------( 129      ( 20 Mar 2021 04:30 )             36.6                                               03-20    133<L>  |  106  |  84<HH>  ----------------------------<  166<H>  5.6<H>   |  12<L>  |  2.9<H>    Creatinine Trend  BUN 84, Cr 2.9, (03-20-21 @ 04:30)  Creatinine Trend  BUN 75, Cr 2.9, (03-19-21 @ 09:46)      Ca    8.5      20 Mar 2021 04:30  Phos  6.8     03-20  Mg     2.6     03-20    TPro  5.5<L>  /  Alb  3.2<L>  /  TBili  0.4  /  DBili  x   /  AST  71<H>  /  ALT  37  /  AlkPhos  33  03-20      PT/INR - ( 20 Mar 2021 04:30 )   PT: 12.20 sec;   INR: 1.06 ratio         PTT - ( 20 Mar 2021 04:30 )  PTT:33.1 sec                                           CARDIAC MARKERS ( 19 Mar 2021 09:46 )  x     / 0.18 ng/mL / x     / x     / x                                                LIVER FUNCTIONS - ( 20 Mar 2021 04:30 )  Alb: 3.2 g/dL / Pro: 5.5 g/dL / ALK PHOS: 33 U/L / ALT: 37 U/L / AST: 71 U/L / GGT: x                                                                                                                                       X-Rays reviewed                                                                                     ECHO    CXR interpreted by me Bilateral infiltrates     MEDICATIONS  (STANDING):  aspirin enteric coated 81 milliGRAM(s) Oral daily  atorvastatin 80 milliGRAM(s) Oral at bedtime  azithromycin  IVPB 500 milliGRAM(s) IV Intermittent every 24 hours  cefTRIAXone   IVPB 2000 milliGRAM(s) IV Intermittent every 24 hours  chlorhexidine 4% Liquid 1 Application(s) Topical daily  dexAMETHasone  Injectable 6 milliGRAM(s) IV Push daily  furosemide   Injectable 40 milliGRAM(s) IV Push every 12 hours  heparin   Injectable 5000 Unit(s) SubCutaneous every 8 hours  influenza   Vaccine 0.5 milliLiter(s) IntraMuscular once  metoprolol succinate ER 25 milliGRAM(s) Oral daily  sodium zirconium cyclosilicate 10 Gram(s) Oral every 8 hours    MEDICATIONS  (PRN):

## 2021-03-21 NOTE — CHART NOTE - NSCHARTNOTEFT_GEN_A_CORE
History of Present Illness  Mr. Mcgill is a 70 year old male patient known to have:  - CKD (baseline Cr 1.5)  - HFrEF (no TTE in chart): Home meds Entresto 97,103mg BID, lasix 20mg 2x/week, Toprol 25mg QD  - DL/CAD (Lipid profile 03/20 Chol 87, TG 64, HDL 41, LDL 38): Home meds Aspirin 81mg QD, Atorvastatin 80mg QD, Entresto as above, Toprol 25mg QD  - DM II (Last Hba1c 6.9 03/20) not on home meds  - History of lower gI bleed and diverticulitis s/p partial colonic resection at [facility] in 2015      He presented to the ED on 03/19 for shortness of breath.  History goes back to 2 weeks prior to presentation when the patient started complaining of shortness of breath on exertion and at rest.  This has been associated with productive cough, chills, and fatigue with reduced PO intake in absence of fever.  On review of systems, patient reports few days of abdominal discomfort and episodes of watery non bloody loose stools. He otherwise denies any recent fever, night sweats, urinary symptoms (urinary frequency, urgency, intermittence, dysuria, foul smelling urine, cloudy urine), headache, nausea, or vomiting. No sick contacts. No recent travel or exposure to recent travelers.      Upon presentation to the ED, the patient was desaturating so was placed on NC 4LPM  Vital Signs in ED   - /92 mmHg  - S 92% on NC 4LPM  - HR 71bpm  - RR 24 bpm      Investigations in ED  - Labs within normal except for BUN 75 and Cr 2.9  - COVID (03/19) +  - Chest X Ray (03/19): bilateral opacities  - CT angio chest PE protocol (03/19) with IC: bilateral ground glass opacities, no PE, possible RCA to PA fistula; might need Cardiac CTA   - Duplex venous LE (03/10) no DVT      Patient was then placed on High Flow NC and admitted to ICU for monitoring  Please refer to below assessment and plan section for a more detailed overview of hospital course      Vital Signs in the last 24 hours   Vitals Summary T(C): 35.9 (03-21-21 @ 08:00), Max: 36.6 (03-21-21 @ 00:00)  HR: 66 (03-21-21 @ 11:00) (56 - 70)  BP: 114/65 (03-21-21 @ 11:00) (82/58 - 114/65)  RR: 20 (03-21-21 @ 11:00) (13 - 28)  SpO2: 97% (03-21-21 @ 11:00) (85% - 99%)  Vent Data   Intake/ Output   03-20-21 @ 07:01  -  03-21-21 @ 07:00  --------------------------------------------------------  IN: 0 mL / OUT: 715 mL / NET: -715 mL      Physical Exam  * General Appearance: Alert, cooperative, interactive, well-groomed, oriented to time, place, and person, in no acute distress  * Head: Normocephalic, without obvious abnormality, atraumatic  * Thyroid:  No enlargement/tenderness/nodules; no carotid bruit or JVD  * Lungs: on NC 5LPM, Respirations unlabored, Good bilateral air entry, audible crackles bilaterally, no audible wheezes or rhonchi)  * Heart: Regular Rate and Rhythm, normal S1 and S2, no audible murmur, rub, or gallop  * Abdomen: Symmetric, non-distended, no scar, soft, non-tender, bowel sounds active all four quadrants, no masses, no organomegaly (no hepatosplenomegaly)  * Extremities: Extremities normal, atraumatic, no cyanosis, no lower extremity pitting edema bilaterally, adequate dorsalis pedis pulses  * Pulses: 2+ and symmetric all extremities  * Skin: Skin color, texture, turgor normal, no rashes or lesions  * Lymph nodes: Cervical, supraclavicular, and axillary nodes normal  * Neurologic:CNII-XII intact, normal strength, sensation and reflexes throughout      Investigations   Laboratory Workup  - CBC:                        11.9   6.20  )-----------( 130      ( 21 Mar 2021 05:00 )             35.5     - Chemistry:  03-21    133<L>  |  106  |  94<HH>  ----------------------------<  132<H>  5.9<H>   |  13<L>  |  3.4<H>    Ca    7.7<L>      21 Mar 2021 05:00  Phos  6.8     03-20  Mg     2.4     03-21    TPro  5.3<L>  /  Alb  2.9<L>  /  TBili  0.3  /  DBili  x   /  AST  62<H>  /  ALT  40  /  AlkPhos  36  03-21    - Coagulation Studies:  PT/INR - ( 20 Mar 2021 23:30 )   PT: 11.50 sec;   INR: 1.00 ratio      - Cardiac Markers:  CARDIAC MARKERS ( 21 Mar 2021 05:00 )  x     / 0.14 ng/mL / x     / x     / x          Microbiological Workup  Culture - Blood (collected 19 Mar 2021 09:50)  Source: .Blood Blood-Peripheral  Preliminary Report (20 Mar 2021 19:01):    No growth to date.    Culture - Blood (collected 19 Mar 2021 09:50)  Source: .Blood Blood-Peripheral  Preliminary Report (20 Mar 2021 19:01):    No growth to date.      Current Medications  Standing Medications  aspirin enteric coated 81 milliGRAM(s) Oral daily  atorvastatin 80 milliGRAM(s) Oral at bedtime  azithromycin  IVPB 500 milliGRAM(s) IV Intermittent every 24 hours  cefTRIAXone   IVPB 2000 milliGRAM(s) IV Intermittent every 24 hours  chlorhexidine 4% Liquid 1 Application(s) Topical daily  dexAMETHasone  Injectable 6 milliGRAM(s) IV Push daily  dextrose 40% Gel 15 Gram(s) Oral once  dextrose 5%. 1000 milliLiter(s) (50 mL/Hr) IV Continuous <Continuous>  dextrose 5%. 1000 milliLiter(s) (100 mL/Hr) IV Continuous <Continuous>  dextrose 50% Injectable 25 Gram(s) IV Push once  dextrose 50% Injectable 12.5 Gram(s) IV Push once  dextrose 50% Injectable 25 Gram(s) IV Push once  glucagon  Injectable 1 milliGRAM(s) IntraMuscular once  heparin   Injectable 5000 Unit(s) SubCutaneous every 8 hours  influenza   Vaccine 0.5 milliLiter(s) IntraMuscular once  insulin lispro (ADMELOG) corrective regimen sliding scale   SubCutaneous three times a day before meals  melatonin 5 milliGRAM(s) Oral at bedtime  metoprolol succinate ER 25 milliGRAM(s) Oral daily  polyethylene glycol 3350 17 Gram(s) Oral every 12 hours  senna 2 Tablet(s) Oral at bedtime  sodium bicarbonate 325 milliGRAM(s) Oral three times a day  sodium zirconium cyclosilicate 10 Gram(s) Oral every 8 hours    PRN Medications    Singles Doses Administered  (Floorstock) 3 each &lt;see task&gt; GiveOnce  (Floorstock) 1 each &lt;see task&gt; GiveOnce  (Floorstock) 1 each &lt;see task&gt; GiveOnce  (Floorstock) 1 each &lt;see task&gt; GiveOnce  (Floorstock) 1 each &lt;see task&gt; GiveOnce  (Floorstock) 2 each &lt;see task&gt; GiveOnce  acetaminophen   Tablet .. 975 milliGRAM(s) Oral once  azithromycin  IVPB 500 milliGRAM(s) IV Intermittent once  calcium gluconate IVPB 2 Gram(s) IV Intermittent once  cefTRIAXone   IVPB 1000 milliGRAM(s) IV Intermittent Once  dexAMETHasone  Injectable 6 milliGRAM(s) IV Push Once  dextrose 50% Injectable 50 milliLiter(s) IV Push once  dextrose 50% Injectable 100 milliLiter(s) IV Push Once  furosemide   Injectable 40 milliGRAM(s) IV Push Once  furosemide   Injectable 40 milliGRAM(s) IV Push once  insulin regular  human recombinant. 10 Unit(s) IV Push once  insulin regular human recombinant IV Push - Peds 10 Unit(s) IV Push once  remdesivir  IVPB 200 milliGRAM(s) IV Intermittent every 24 hours      Assessment and Plan  Case of a 70 year old male patient with CKD, HFrEF, HTN, DM, CAD, and DM who presented on 03/19 with few days of shortness of breath, found to have COVID pneumonia, admitted for hypoxic RF secondary to COVID pneumonia. Currently hemodynamically stable.      Acute Hypoxic Respiratory Failure Secondary to COVID Pneumonia  * SARS-COV2: positive 03/19  * COVID Antibody ordered for 03/21  * Chest X Ray: 03/19 bilateral opacities  * CT angio chest AP IC PE Protocol (03/19) no PE, bilateral groundglass opacities, enlarged thoracic lymph nodes  * Markers as below    - Infectious Disease and Pulmonary team are on board  - Monitor for fever: afebrile in last 24 hours  - Trend WBC: 03/20 1.97  - Monitor SaO2 and Oxygen Requirements: 03/20 HFNC 50L 50% with S92-98% -> switched to NC at 5LPM 03/21 with S96 % so will downgrade to F9  - Follow up Chest X Ray on 03/21. Last Chest X Ray on 03/20 stable bilateral opacities  - Trend Inflammatory Markers:  --> D-dimer 03/19 562-> follow up repeat on 03/21  --> Procalcitonin 03/19 0.67-> follow up repeat on 03/21  --> C-reactive protein 03/19 43-> follow up repeat on 03/21  --> LDH -> follow up repeat on  --> Ferritin follow up level 03/21  --> Fibrinogen -> follow up repeat on  - Follow up blood cultures 03/19 x2 received  - COVID therapy:  --> Will discontinue course of IV Remdesevir. S/P 200mg x1 03/21  --> Consider IV Tocilizumab in case CRP >75.  --> Started on IV Dexamethasone 6mg QD on 03/19 for 10 days  - Antibiotics:  --> Continue IV Azithromcyin 500mg QD (03/19) and IV Rocephin 2g QD (03/19) pending procalcitonin per ID  - Anticoagulation Heparin 5000 Q8h. 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 S/P IV Lasix 40mg x2 doses 03/19. Keep off IV fluid hydration for now since euvolemic  - Monitor K and HCO3 QD. Started NaHCO3 325mg TID 03/20 and Lokelma 10mg Q8h 03/19  - Order bladder scan  - Follow up ultrasound renal (ordered 03/20)  - Order urinalysis (ordered pending collection)      HTN  * Home meds: Entresto 97, 103mg BID, Lasix 20mg 2x/week, Toprol 25mg QD  - Monitor BP   - Continue Toprol 25mg QD      DM II  * Hba1c 6.9 03/20  * No home meds  - Monitor POCT premeals and at bedtime  - No need for insulin for now      Dyslipidemia  CAD  * Lipid profile 03/20 chol 87, TG 64, HDL 41, LDL 38  * Home meds: Aspirin 81mg QD, Atorvastatin 80mg QD, Entresto 90, 103mg BID, Ezetimibe 10mg QD, Toprol 25mg QD  * ECG 03/19 NSR, L axis deviation, LAFB  * Troponin 03/19 0.18 -> repeat 03/21    -Trend troponin 03/19 0.18 -> repeat 03/21  - Continue Aspirin 81mg QD  - Continue Atorvastatin 80mg QD  - Continue Toprol 25mg QD       HFrEF  * Last TTE not present in chart  * Home meds: Lasix 20mg 2x/week, Entresto 97,103mg BID, Toprol 25mg QD  * Pro-BNP (03/19) 2718  * TSH (03/20) received  - Monitor accurate intake/output  - Monitor daily weight   - Monitor SaO2 and Oxygen Requirements: 03/20 HFNC 50L 50% with S92-98%. Will place on BiPAP overnight  - Follow up Chest X Ray on 03/21. Last Chest X Ray on 03/20 stable bilateral opacities  - Hold diuretics for few days given recent exposure to contrast. S/P IV Lasix 40mg x2 doses 03/19. Keep off IV fluid hydration for now since euvolemic      Others  - DVT Prophylaxis Heparin 5000 Q8h. Duplex venous LE 03/19 no DVT  - GI Prophylaxis: not on Pantoprazole 40mg PO QD  - Diet: DASH/TLC  - Code Status: Full      Barriers to learning: NO  Discharge Planning: Patient will be discharged once stable   Plan was communicated with medical team and patient      Stacy Valerio MD  PGY - 1 Internal Medicine   [facility]

## 2021-03-21 NOTE — PROGRESS NOTE ADULT - ASSESSMENT
IMPRESSION:    Acute hypoxemic respiratory failure improving   Severe COVI D pneumonia  Possible superimposed bacterial infection  HO HF   Probable SAIDA   SAGE on CKD     PLAN:    CNS: NO depressants     HEENT: Oral care    PULMONARY:  HOB @ 45 degrees.  Aspiration precautions.  Wean O2.  NIV during sleep    CARDIOVASCULAR:  Avoid volume overload     GI: GI prophylaxis.  Feeding.  Bowel regimen     RENAL:  Follow up lytes.  Correct as needed. Lou  Kidney Bladder US.  Lokelma     INFECTIOUS DISEASE: Follow up cultures.  FU inflammatory markers. Continue ABX.      HEMATOLOGICAL:  DVT prophylaxis.      ENDOCRINE:  Follow up FS.     MUSCULOSKELETAL:  Bed Chair position     F9

## 2021-03-21 NOTE — PROGRESS NOTE ADULT - SUBJECTIVE AND OBJECTIVE BOX
Patient is a 70y old  Male who presents with a chief complaint of SOB (20 Mar 2021 22:09)        Over Night Events:  on 5 liters 96% Off pressors.          ROS:     All ROS are negative except HPI         PHYSICAL EXAM    ICU Vital Signs Last 24 Hrs  T(C): 35.9 (21 Mar 2021 08:00), Max: 36.6 (21 Mar 2021 00:00)  T(F): 96.6 (21 Mar 2021 08:00), Max: 97.9 (21 Mar 2021 00:00)  HR: 62 (21 Mar 2021 09:00) (56 - 70)  BP: 99/62 (21 Mar 2021 09:00) (82/58 - 111/63)  BP(mean): 72 (21 Mar 2021 09:00) (66 - 90)  ABP: --  ABP(mean): --  RR: 22 (21 Mar 2021 09:00) (13 - 28)  SpO2: 96% (21 Mar 2021 09:00) (85% - 99%)      CONSTITUTIONAL:  Well nourished.  NAD    ENT:   Airway patent,   Mouth with normal mucosa.   No thrush    EYES:   Pupils equal,   Round and reactive to light.    CARDIAC:   Normal rate,   Regular rhythm.     edema      Vascular:  Normal systolic impulse  No Carotid bruits    RESPIRATORY:   No wheezing  Bilateral BS  Normal chest expansion  Not tachypneic,  No use of accessory muscles    GASTROINTESTINAL:  Abdomen soft,   Non-tender,   No guarding,   + BS    MUSCULOSKELETAL:   Range of motion is not limited,  No clubbing, cyanosis    NEUROLOGICAL:   Alert and oriented   No motor  deficits.    SKIN:   Skin normal color for race,   Warm and dry and intact.   No evidence of rash.    PSYCHIATRIC:   Normal mood and affect.   No apparent risk to self or others.    HEMATOLOGICAL:  No cervical  lymphadenopathy.  no inguinal lymphadenopathy      03-20-21 @ 07:01  -  03-21-21 @ 07:00  --------------------------------------------------------  IN:  Total IN: 0 mL    OUT:    Voided (mL): 715 mL  Total OUT: 715 mL    Total NET: -715 mL          LABS:                            11.9   6.20  )-----------( 130      ( 21 Mar 2021 05:00 )             35.5                                               03-21    133<L>  |  106  |  94<HH>  ----------------------------<  132<H>  5.9<H>   |  13<L>  |  3.4<H>    21 Mar 2021 05:00    133<L>  |  106    |  94<HH>  ----------------------------<  132<H>  5.9<H>   |  13<L>  |  3.4<H>  20 Mar 2021 23:30    x      |  x      |  x      ----------------------------<  x      x       |  x      |  3.0<H>    Ca    7.7<L>      21 Mar 2021 05:00  Ca    8.4<L>      20 Mar 2021 08:12  Phos  6.8<H>     20 Mar 2021 04:30  Mg     2.4       21 Mar 2021 05:00  Mg     2.5<H>     20 Mar 2021 08:12    TPro  5.3<L>  /  Alb  2.9<L>  /  TBili  0.3    /  DBili  x      /  AST  62<H>  /  ALT  40     /  AlkPhos  36     21 Mar 2021 05:00  TPro  5.4<L>  /  Alb  3.2<L>  /  TBili  0.3    /  DBili  0.2    /  AST  60<H>  /  ALT  40     /  AlkPhos  40     20 Mar 2021 23:30      Ca    7.7<L>      21 Mar 2021 05:00  Phos  6.8     03-20  Mg     2.4     03-21    TPro  5.3<L>  /  Alb  2.9<L>  /  TBili  0.3  /  DBili  x   /  AST  62<H>  /  ALT  40  /  AlkPhos  36  03-21      PT/INR - ( 20 Mar 2021 23:30 )   PT: 11.50 sec;   INR: 1.00 ratio         PTT - ( 20 Mar 2021 04:30 )  PTT:33.1 sec                                           CARDIAC MARKERS ( 21 Mar 2021 05:00 )  x     / 0.14 ng/mL / x     / x     / x      CARDIAC MARKERS ( 19 Mar 2021 09:46 )  x     / 0.18 ng/mL / x     / x     / x                                                LIVER FUNCTIONS - ( 21 Mar 2021 05:00 )  Alb: 2.9 g/dL / Pro: 5.3 g/dL / ALK PHOS: 36 U/L / ALT: 40 U/L / AST: 62 U/L / GGT: x                                                  Culture - Blood (collected 19 Mar 2021 09:50)  Source: .Blood Blood-Peripheral  Preliminary Report (20 Mar 2021 19:01):    No growth to date.    Culture - Blood (collected 19 Mar 2021 09:50)  Source: .Blood Blood-Peripheral  Preliminary Report (20 Mar 2021 19:01):    No growth to date.                                                                                           MEDICATIONS  (STANDING):  aspirin enteric coated 81 milliGRAM(s) Oral daily  atorvastatin 80 milliGRAM(s) Oral at bedtime  azithromycin  IVPB 500 milliGRAM(s) IV Intermittent every 24 hours  cefTRIAXone   IVPB 2000 milliGRAM(s) IV Intermittent every 24 hours  chlorhexidine 4% Liquid 1 Application(s) Topical daily  dexAMETHasone  Injectable 6 milliGRAM(s) IV Push daily  dextrose 40% Gel 15 Gram(s) Oral once  dextrose 5%. 1000 milliLiter(s) (50 mL/Hr) IV Continuous <Continuous>  dextrose 5%. 1000 milliLiter(s) (100 mL/Hr) IV Continuous <Continuous>  dextrose 50% Injectable 25 Gram(s) IV Push once  dextrose 50% Injectable 12.5 Gram(s) IV Push once  dextrose 50% Injectable 25 Gram(s) IV Push once  glucagon  Injectable 1 milliGRAM(s) IntraMuscular once  heparin   Injectable 5000 Unit(s) SubCutaneous every 8 hours  influenza   Vaccine 0.5 milliLiter(s) IntraMuscular once  insulin lispro (ADMELOG) corrective regimen sliding scale   SubCutaneous three times a day before meals  melatonin 5 milliGRAM(s) Oral at bedtime  metoprolol succinate ER 25 milliGRAM(s) Oral daily  polyethylene glycol 3350 17 Gram(s) Oral every 12 hours  remdesivir  IVPB   IV Intermittent   senna 2 Tablet(s) Oral at bedtime  sodium bicarbonate 325 milliGRAM(s) Oral three times a day  sodium zirconium cyclosilicate 10 Gram(s) Oral every 8 hours    MEDICATIONS  (PRN):      New X-rays reviewed:                                                                                  ECHO    CXR interpreted by me:  Bilateral infiltrates

## 2021-03-21 NOTE — PROGRESS NOTE ADULT - ASSESSMENT
Acute severe COVID pneumonia  Acute hypoxemic respiratory failure  Acute on CKD (baseline serum creatinine 1.5 mg/dL)  looks like type 4 RTA - obstruction r/o with 80 ml urine on bladder scan -  COVID vs. contrast as reason for acute kidney injury  systolic CHF - usually NYHA Class 2-3 now Class 4    Suggest:  check renal sonogram  u/a - urine electrolytes  patient clinically euvolemic - would not give either IVF or diuretic at this point  monitor K and bicarb - now on bicarb 650 mg tid with meals  no indication today for dialysis

## 2021-03-21 NOTE — PROVIDER CONTACT NOTE (EICU) - SITUATION
Lou ordered for retention. RN bladder scanned pt. 83cc in bladder per scan. Pt has B/L dressings from outpatient. Pt reported he has leg ulcers and got his legs wrapped on 3/18. Pt states the wraps are to stay on until 3/25.

## 2021-03-21 NOTE — PROGRESS NOTE ADULT - SUBJECTIVE AND OBJECTIVE BOX
patient seen and examined and discussed with ICU team.  today patient reports he feels better as far as his breathing - now on NP 5L, off high flow oxygen.  had BM yesterday.  no sensation of urgency or need to urinate.  Vital Signs Last 24 Hrs  T(C): 35.9 (21 Mar 2021 08:00), Max: 36.6 (21 Mar 2021 00:00)  T(F): 96.6 (21 Mar 2021 08:00), Max: 97.9 (21 Mar 2021 00:00)  HR: 62 (21 Mar 2021 09:00) (56 - 70)  BP: 99/62 (21 Mar 2021 09:00) (82/58 - 111/63)  BP(mean): 72 (21 Mar 2021 09:00) (66 - 90)  RR: 22 (21 Mar 2021 09:00) (13 - 28)  SpO2: 96% (21 Mar 2021 09:00) (85% - 99%)  conj pink, no jaundice  neck veins not visible - thick neck.  neck supple.  lungs good air entry bilaterally.  heart regular rhythm.   abd. obese.  bs pos. no palpable bladder  extremities 1+ edema                            11.9   6.20  )-----------( 130      ( 21 Mar 2021 05:00 )             35.5   03-21    133<L>  |  106  |  94<HH>  ----------------------------<  132<H>  5.9<H>   |  13<L>  |  3.4<H>    Ca    7.7<L>      21 Mar 2021 05:00  Phos  6.8     03-20  Mg     2.4     03-21    TPro  5.3<L>  /  Alb  2.9<L>  /  TBili  0.3  /  DBili  x   /  AST  62<H>  /  ALT  40  /  AlkPhos  36  03-21

## 2021-03-22 NOTE — PROGRESS NOTE ADULT - ASSESSMENT
Acute severe COVID pneumonia  Acute hypoxemic respiratory failure  Acute on CKD (baseline serum creatinine 1.5 mg/dL)  looks like type 4 RTA - obstruction r/o with 80 ml urine on bladder scan -  COVID vs. contrast as reason for acute kidney injury  systolic CHF - usually NYHA Class 2-3 now Class 4    Suggest:  Minimize fluid intake  Continue PO bicarb  Continue Lokelma  Can use IV Lasix to drive urine output if needed   No indication for dialysis yet, hopefully, renal recovery in next few days

## 2021-03-22 NOTE — PROGRESS NOTE ADULT - ASSESSMENT
70 year old male patient with CKD, HFrEF, HTN, DM, CAD, and DM who presented on 03/19 with few days of shortness of breath, found to have COVID pneumonia, admitted for hypoxic RF secondary to COVID pneumonia. Currently hemodynamically stable.    Acute Hypoxic Respiratory Failure Secondary to COVID Pneumonia  * SARS-COV2: positive 03/19  * COVID Antibody pending  * Chest X Ray: increase b/l opacities today  * CT angio chest AP IC PE Protocol (03/19) no PE, bilateral groundglass opacities, enlarged thoracic lymph nodes  * Markers as below  - Infectious Disease and Pulmonary team are on board  - Monitor for fever: afebrile in last 24 hours  - saturating well on 5 L NC  - blood cx negative  - s/p 1 dose of RDV, stopped secondary to low GFR  - f/u CRP, consider toci if >75  - c/w dexa for 10 days  - COVID therapy:  - Continue IV Azithromcyin 500mg QD (03/19) and IV Rocephin 2g QD (03/19) pending procalcitonin per ID  - Anticoagulation Heparin 5000 Q8h. 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 S/P IV Lasix 40mg x2 doses 03/19. Keep off IV fluid hydration for now since euvolemic  - Monitor K and HCO3 QD. started on lokelma and bicarb  - f/u nephro recs  - CT a/p negative for hydro    HTN  * Home meds: Entresto 97, 103mg BID, Lasix 20mg 2x/week, Toprol 25mg QD  - hold entresto and lasix given SAGE  - Monitor BP   - Continue Toprol 25mg QD    DM II  * Hba1c 6.9 03/20  * No home meds  - Monitor POCT premeals and at bedtime  - No need for insulin for now      Dyslipidemia  CAD  * Lipid profile 03/20 chol 87, TG 64, HDL 41, LDL 38  * Home meds: Aspirin 81mg QD, Atorvastatin 80mg QD, Entresto 90, 103mg BID, Ezetimibe 10mg QD, Toprol 25mg QD  * ECG 03/19 NSR, L axis deviation, LAFB  * Troponin not uptrending, likely secondary to kidney injury  - Continue Aspirin 81mg QD  - Continue Atorvastatin 80mg QD  - Continue Toprol 25mg QD       HFrEF  * Last TTE not present in chart  * Home meds: Lasix 20mg 2x/week, Entresto 97,103mg BID, Toprol 25mg QD  * Pro-BNP (03/19) 7238  - Monitor accurate intake/output  - Monitor daily weight   - Hold diuretics for few days given recent exposure to contrast. S/P IV Lasix 40mg x2 doses 03/19. Keep off IV fluid hydration for now since euvolemic      Others  - DVT Prophylaxis Heparin 5000 Q8h. Duplex venous LE 03/19 no DVT  - GI Prophylaxis: not on Pantoprazole 40mg PO QD  - Diet: DASH/TLC  - Code Status: Full   70 year old male patient with CKD, HFrEF, HTN, DM, CAD, and DM who presented on 03/19 with few days of shortness of breath, found to have COVID pneumonia, admitted for hypoxic RF secondary to COVID pneumonia. Currently hemodynamically stable.    Acute Hypoxemic Respiratory Failure Secondary to COVID Pneumonia  * SARS-COV2: positive 03/19  * COVID Antibody pending  * Chest X Ray: increase b/l opacities today  * CT angio chest AP IC PE Protocol (03/19) no PE, bilateral groundglass opacities, enlarged thoracic lymph nodes  * Markers as below  - Infectious Disease and Pulmonary team are on board  - Monitor for fever: afebrile in last 24 hours  - saturating well on 5 L NC  - blood cx negative  - s/p 1 dose of RDV, stopped secondary to low GFR  - f/u CRP, consider toci if >75  - c/w dexa for 10 days  - COVID therapy:  - Continue IV Azithromcyin 500mg QD (03/19) and IV Rocephin 2g QD (03/19) pending procalcitonin per ID  - Anticoagulation Heparin 5000 Q8h. 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 S/P IV Lasix 40mg x2 doses 03/19. Keep off IV fluid hydration for now since euvolemic  - Monitor K and HCO3 QD. started on lokelma and bicarb  - f/u nephro recs  - CT a/p negative for hydro    HTN  * Home meds: Entresto 97, 103mg BID, Lasix 20mg 2x/week, Toprol 25mg QD  - hold entresto and lasix given SAGE  - Monitor BP   - Continue Toprol 25mg QD    DM II  * Hba1c 6.9 03/20  * No home meds  - Monitor POCT premeals and at bedtime  - No need for insulin for now      Dyslipidemia  CAD  * Lipid profile 03/20 chol 87, TG 64, HDL 41, LDL 38  * Home meds: Aspirin 81mg QD, Atorvastatin 80mg QD, Entresto 90, 103mg BID, Ezetimibe 10mg QD, Toprol 25mg QD  * ECG 03/19 NSR, L axis deviation, LAFB  * Troponin not uptrending, likely secondary to kidney injury  - Continue Aspirin 81mg QD  - Continue Atorvastatin 80mg QD  - Continue Toprol 25mg QD       HFrEF  * Last TTE not present in chart  * Home meds: Lasix 20mg 2x/week, Entresto 97,103mg BID, Toprol 25mg QD  * Pro-BNP (03/19) 1088  - Monitor accurate intake/output  - Monitor daily weight   - Hold diuretics for few days given recent exposure to contrast. S/P IV Lasix 40mg x2 doses 03/19. Keep off IV fluid hydration for now since euvolemic      Others  - DVT Prophylaxis Heparin 5000 Q8h. Duplex venous LE 03/19 no DVT  - GI Prophylaxis: not on Pantoprazole 40mg PO QD  - Diet: DASH/TLC  - Code Status: Full

## 2021-03-22 NOTE — PROGRESS NOTE ADULT - SUBJECTIVE AND OBJECTIVE BOX
[facility] [facility] FOLLOW UP NOTE  --------------------------------------------------------------------------------  24 hour events/subjective: Patient examined. Appears comfortable.    PAST HISTORY  --------------------------------------------------------------------------------  No significant changes to PMH, PSH, FHx, SHx, unless otherwise noted    ALLERGIES & MEDICATIONS  --------------------------------------------------------------------------------  Allergies    ACE inhibitors (Angioedema)    Intolerances      Standing Inpatient Medications  aspirin enteric coated 81 milliGRAM(s) Oral daily  atorvastatin 80 milliGRAM(s) Oral at bedtime  azithromycin  IVPB 500 milliGRAM(s) IV Intermittent every 24 hours  cefTRIAXone   IVPB 2000 milliGRAM(s) IV Intermittent every 24 hours  chlorhexidine 4% Liquid 1 Application(s) Topical daily  dexAMETHasone  Injectable 6 milliGRAM(s) IV Push daily  dextrose 40% Gel 15 Gram(s) Oral once  dextrose 5%. 1000 milliLiter(s) IV Continuous <Continuous>  dextrose 5%. 1000 milliLiter(s) IV Continuous <Continuous>  dextrose 50% Injectable 25 Gram(s) IV Push once  dextrose 50% Injectable 12.5 Gram(s) IV Push once  dextrose 50% Injectable 25 Gram(s) IV Push once  glucagon  Injectable 1 milliGRAM(s) IntraMuscular once  heparin   Injectable 5000 Unit(s) SubCutaneous every 8 hours  influenza   Vaccine 0.5 milliLiter(s) IntraMuscular once  insulin lispro (ADMELOG) corrective regimen sliding scale   SubCutaneous three times a day before meals  melatonin 5 milliGRAM(s) Oral at bedtime  metoprolol succinate ER 25 milliGRAM(s) Oral daily  polyethylene glycol 3350 17 Gram(s) Oral every 12 hours  senna 2 Tablet(s) Oral at bedtime  sodium bicarbonate 650 milliGRAM(s) Oral three times a day  sodium zirconium cyclosilicate 10 Gram(s) Oral every 8 hours    PRN Inpatient Medications  guaifenesin/dextromethorphan  Syrup 5 milliLiter(s) Oral every 6 hours PRN      VITALS/PHYSICAL EXAM  --------------------------------------------------------------------------------  T(C): 36.3 (03-22-21 @ 05:25), Max: 36.4 (03-21-21 @ 14:00)  HR: 69 (03-22-21 @ 05:25) (68 - 95)  BP: 100/76 (03-22-21 @ 05:25) (90/53 - 132/73)  RR: 20 (03-22-21 @ 05:25) (20 - 22)  SpO2: 97% (03-22-21 @ 08:14) (92% - 97%)    03-21-21 @ 07:01  -  03-22-21 @ 07:00  --------------------------------------------------------  IN: 301 mL / OUT: 450 mL / NET: -149 mL    Physical Exam:  	Gen: NAD  	Pulm: CTA B/L  	CV: RRR, S1S2  	Abd: +BS, soft, nontender/nondistended  	: No suprapubic tenderness  	LE: Warm, edema    LABS/STUDIES  --------------------------------------------------------------------------------              13.1   7.49  >-----------<  163      [03-22-21 @ 08:03]              38.3     131  |  101  |  111  ----------------------------<  124      [03-22-21 @ 08:03]  5.4   |  14  |  3.4        Ca     7.8     [03-22-21 @ 08:03]      Mg     2.7     [03-22-21 @ 08:03]    TPro  5.9  /  Alb  3.4  /  TBili  0.4  /  DBili  0.2  /  AST  81  /  ALT  55  /  AlkPhos  48  [03-22-21 @ 08:03]    PT/INR: PT 11.20, INR 0.97       [03-22-21 @ 08:03]    Troponin 0.14      [03-21-21 @ 05:00]    Creatinine Trend:  SCr 3.4 [03-22 @ 08:03]  SCr 3.4 [03-22 @ 00:50]  SCr 3.5 [03-21 @ 17:21]  SCr 3.4 [03-21 @ 05:00]  SCr 3.0 [03-20 @ 23:30]    Ferritin 1478      [03-19-21 @ 09:46]  HbA1c 6.1      [11-23-19 @ 08:02]  TSH 0.46      [03-20-21 @ 04:30]  Lipid: chol 87, TG 64, HDL 41, LDL --      [03-20-21 @ 04:30]

## 2021-03-22 NOTE — PROGRESS NOTE ADULT - SUBJECTIVE AND OBJECTIVE BOX
SUBJECTIVE:    Patient is a 70y old Male who presents with a chief complaint of SOB (21 Mar 2021 10:21)    Overnight Events: Patient is stable, no acute events overnight.  He is on 5 L NC saturating well.  No major complaints.    PAST MEDICAL & SURGICAL HISTORY  Diabetes    Dyslipidemia    Hypertension    Heart failure    Diverticulitis    S/P partial resection of colon      SOCIAL HISTORY:  Negative for smoking/alcohol/drug use.     ALLERGIES:  ACE inhibitors (Angioedema)    MEDICATIONS:  STANDING MEDICATIONS  aspirin enteric coated 81 milliGRAM(s) Oral daily  atorvastatin 80 milliGRAM(s) Oral at bedtime  azithromycin  IVPB 500 milliGRAM(s) IV Intermittent every 24 hours  cefTRIAXone   IVPB 2000 milliGRAM(s) IV Intermittent every 24 hours  chlorhexidine 4% Liquid 1 Application(s) Topical daily  dexAMETHasone  Injectable 6 milliGRAM(s) IV Push daily  dextrose 40% Gel 15 Gram(s) Oral once  dextrose 5%. 1000 milliLiter(s) IV Continuous <Continuous>  dextrose 5%. 1000 milliLiter(s) IV Continuous <Continuous>  dextrose 50% Injectable 25 Gram(s) IV Push once  dextrose 50% Injectable 12.5 Gram(s) IV Push once  dextrose 50% Injectable 25 Gram(s) IV Push once  glucagon  Injectable 1 milliGRAM(s) IntraMuscular once  heparin   Injectable 5000 Unit(s) SubCutaneous every 8 hours  influenza   Vaccine 0.5 milliLiter(s) IntraMuscular once  insulin lispro (ADMELOG) corrective regimen sliding scale   SubCutaneous three times a day before meals  melatonin 5 milliGRAM(s) Oral at bedtime  metoprolol succinate ER 25 milliGRAM(s) Oral daily  polyethylene glycol 3350 17 Gram(s) Oral every 12 hours  senna 2 Tablet(s) Oral at bedtime  sodium bicarbonate 650 milliGRAM(s) Oral three times a day  sodium zirconium cyclosilicate 10 Gram(s) Oral every 8 hours    PRN MEDICATIONS  guaifenesin/dextromethorphan  Syrup 5 milliLiter(s) Oral every 6 hours PRN    VITALS:   T(F): 97.4, Max: 97.5 (03-21-21 @ 14:00)  HR: 69 (68 - 95)  BP: 100/76 (90/53 - 132/73)  RR: 20 (20 - 22)  SpO2: 97% (92% - 97%)    LABS:                        13.1   7.49  )-----------( 163      ( 22 Mar 2021 08:03 )             38.3     03-22    131<L>  |  101  |  111<HH>  ----------------------------<  124<H>  5.4<H>   |  14<L>  |  3.4<H>    Ca    7.8<L>      22 Mar 2021 08:03  Mg     2.7     03-22    TPro  5.9<L>  /  Alb  3.4<L>  /  TBili  0.4  /  DBili  0.2  /  AST  81<H>  /  ALT  55<H>  /  AlkPhos  48  03-22    PT/INR - ( 22 Mar 2021 08:03 )   PT: 11.20 sec;   INR: 0.97 ratio                   CARDIAC MARKERS ( 21 Mar 2021 05:00 )  x     / 0.14 ng/mL / x     / x     / x              03-21-21 @ 07:01  -  03-22-21 @ 07:00  --------------------------------------------------------  IN: 301 mL / OUT: 450 mL / NET: -149 mL          PHYSICAL EXAM:  GEN: NAD, comfortable  LUNGS: CTAB, b/l crackles  HEART: RRR, s1 and s2 appreciated, no m/r/g  ABD: soft, NT/ND, +BS  EXT: b/l AURA  NEURO: AAOX3 SUBJECTIVE:    Patient is a 70y old Male who presents with a chief complaint of SOB (21 Mar 2021 10:21)    Overnight Events: Patient is stable, no acute events overnight.  He is on 5 L NC saturating well.  No major complaints.    PAST MEDICAL & SURGICAL HISTORY  Diabetes    Dyslipidemia    Hypertension    Heart failure    Diverticulitis    S/P partial resection of colon      ALLERGIES:  ACE inhibitors (Angioedema)    MEDICATIONS:  STANDING MEDICATIONS  aspirin enteric coated 81 milliGRAM(s) Oral daily  atorvastatin 80 milliGRAM(s) Oral at bedtime  azithromycin  IVPB 500 milliGRAM(s) IV Intermittent every 24 hours  cefTRIAXone   IVPB 2000 milliGRAM(s) IV Intermittent every 24 hours  chlorhexidine 4% Liquid 1 Application(s) Topical daily  dexAMETHasone  Injectable 6 milliGRAM(s) IV Push daily  dextrose 40% Gel 15 Gram(s) Oral once  dextrose 5%. 1000 milliLiter(s) IV Continuous <Continuous>  dextrose 5%. 1000 milliLiter(s) IV Continuous <Continuous>  dextrose 50% Injectable 25 Gram(s) IV Push once  dextrose 50% Injectable 12.5 Gram(s) IV Push once  dextrose 50% Injectable 25 Gram(s) IV Push once  glucagon  Injectable 1 milliGRAM(s) IntraMuscular once  heparin   Injectable 5000 Unit(s) SubCutaneous every 8 hours  influenza   Vaccine 0.5 milliLiter(s) IntraMuscular once  insulin lispro (ADMELOG) corrective regimen sliding scale   SubCutaneous three times a day before meals  melatonin 5 milliGRAM(s) Oral at bedtime  metoprolol succinate ER 25 milliGRAM(s) Oral daily  polyethylene glycol 3350 17 Gram(s) Oral every 12 hours  senna 2 Tablet(s) Oral at bedtime  sodium bicarbonate 650 milliGRAM(s) Oral three times a day  sodium zirconium cyclosilicate 10 Gram(s) Oral every 8 hours    PRN MEDICATIONS  guaifenesin/dextromethorphan  Syrup 5 milliLiter(s) Oral every 6 hours PRN    VITALS:   T(F): 97.4, Max: 97.5 (03-21-21 @ 14:00)  HR: 69 (68 - 95)  BP: 100/76 (90/53 - 132/73)  RR: 20 (20 - 22)  SpO2: 97% (92% - 97%)    LABS:                        13.1   7.49  )-----------( 163      ( 22 Mar 2021 08:03 )             38.3     03-22    131<L>  |  101  |  111<HH>  ----------------------------<  124<H>  5.4<H>   |  14<L>  |  3.4<H>    Ca    7.8<L>      22 Mar 2021 08:03  Mg     2.7     03-22    TPro  5.9<L>  /  Alb  3.4<L>  /  TBili  0.4  /  DBili  0.2  /  AST  81<H>  /  ALT  55<H>  /  AlkPhos  48  03-22    PT/INR - ( 22 Mar 2021 08:03 )   PT: 11.20 sec;   INR: 0.97 ratio                   CARDIAC MARKERS ( 21 Mar 2021 05:00 )  x     / 0.14 ng/mL / x     / x     / x              03-21-21 @ 07:01  -  03-22-21 @ 07:00  --------------------------------------------------------  IN: 301 mL / OUT: 450 mL / NET: -149 mL          PHYSICAL EXAM:  GEN: NAD, comfortable  LUNGS: CTAB, b/l crackles  HEART: RRR, s1 and s2 appreciated, no m/r/g  ABD: soft, NT/ND, +BS  EXT: b/l AURA  NEURO: AAOX3

## 2021-03-23 NOTE — PROGRESS NOTE ADULT - SUBJECTIVE AND OBJECTIVE BOX
SUBJECTIVE:    Patient is a 70y old Male who presents with a chief complaint of SOB (22 Mar 2021 12:48)    Overnight Events: Patient desaturated overnight, he was given 40 mg of lasix iv and was put on NRFM.  Today he was titrated to NC 6 L saturating 92.  VS are normal. no major complaints.    PAST MEDICAL & SURGICAL HISTORY  Diabetes    Dyslipidemia    Hypertension    Heart failure    Diverticulitis    S/P partial resection of colon      SOCIAL HISTORY:  Negative for smoking/alcohol/drug use.     ALLERGIES:  ACE inhibitors (Angioedema)    MEDICATIONS:  STANDING MEDICATIONS  aspirin enteric coated 81 milliGRAM(s) Oral daily  atorvastatin 80 milliGRAM(s) Oral at bedtime  azithromycin  IVPB 500 milliGRAM(s) IV Intermittent every 24 hours  cefTRIAXone   IVPB 2000 milliGRAM(s) IV Intermittent every 24 hours  chlorhexidine 4% Liquid 1 Application(s) Topical daily  dexAMETHasone  Injectable 6 milliGRAM(s) IV Push daily  dextrose 40% Gel 15 Gram(s) Oral once  dextrose 5%. 1000 milliLiter(s) IV Continuous <Continuous>  dextrose 5%. 1000 milliLiter(s) IV Continuous <Continuous>  dextrose 50% Injectable 25 Gram(s) IV Push once  dextrose 50% Injectable 12.5 Gram(s) IV Push once  dextrose 50% Injectable 25 Gram(s) IV Push once  glucagon  Injectable 1 milliGRAM(s) IntraMuscular once  heparin   Injectable 5000 Unit(s) SubCutaneous every 8 hours  influenza   Vaccine 0.5 milliLiter(s) IntraMuscular once  insulin lispro (ADMELOG) corrective regimen sliding scale   SubCutaneous three times a day before meals  melatonin 5 milliGRAM(s) Oral at bedtime  metoprolol succinate ER 25 milliGRAM(s) Oral daily  polyethylene glycol 3350 17 Gram(s) Oral every 12 hours  senna 2 Tablet(s) Oral at bedtime  sodium bicarbonate 650 milliGRAM(s) Oral three times a day  sodium zirconium cyclosilicate 10 Gram(s) Oral every 8 hours    PRN MEDICATIONS  guaifenesin/dextromethorphan  Syrup 5 milliLiter(s) Oral every 6 hours PRN    VITALS:   T(F): 96, Max: 96.6 (21 @ 13:58)  HR: 63 (63 - 84)  BP: 103/70 (103/70 - 127/66)  RR: 20 (19 - 20)  SpO2: 99% (93% - 99%)    LABS:                        13.1   7.49  )-----------( 163      ( 22 Mar 2021 08:03 )             38.3         132<L>  |  104  |  111<HH>  ----------------------------<  153<H>  5.2<H>   |  12<L>  |  3.4<H>    Ca    7.2<L>      23 Mar 2021 00:17  Mg     2.7         TPro  5.9<L>  /  Alb  3.4<L>  /  TBili  0.4  /  DBili  0.2  /  AST  81<H>  /  ALT  55<H>  /  AlkPhos  48      PT/INR - ( 22 Mar 2021 08:03 )   PT: 11.20 sec;   INR: 0.97 ratio           Urinalysis Basic - ( 23 Mar 2021 09:35 )    Color: Yellow / Appearance: Clear / S.016 / pH: x  Gluc: x / Ketone: Negative  / Bili: Negative / Urobili: <2 mg/dL   Blood: x / Protein: 300 mg/dL / Nitrite: Negative   Leuk Esterase: Negative / RBC: 3 /HPF / WBC 3 /HPF   Sq Epi: x / Non Sq Epi: 1 /HPF / Bacteria: Negative        Lactate, Blood: 1.4 mmol/L (21 @ 16:00)              21 @ 07:  -  21 @ 07:00  --------------------------------------------------------  IN: 1120 mL / OUT: 1675 mL / NET: -555 mL    21 @ 07:01  -  21 @ 10:48  --------------------------------------------------------  IN: 564 mL / OUT: 325 mL / NET: 239 mL    PHYSICAL EXAM:  GEN: NAD, comfortable  LUNGS: CTAB, b/l crackles   HEART: RRR, s1 and s2 appreciated, no m/r/g  ABD: soft, NT/ND, +BS  EXT: b/l AURA, legs wrapped  NEURO: AAOX3 SUBJECTIVE:    Patient is a 70y old Male who presents with a chief complaint of SOB (22 Mar 2021 12:48)    Overnight Events: Patient desaturated overnight, he was given 40 mg of lasix iv and was put on NRFM.  Today he was titrated to NC 6 L saturating 92.  VS are normal. no major complaints.    PAST MEDICAL & SURGICAL HISTORY  Diabetes    Dyslipidemia    Hypertension    Heart failure    Diverticulitis    S/P partial resection of colon      ALLERGIES:  ACE inhibitors (Angioedema)    MEDICATIONS:  STANDING MEDICATIONS  aspirin enteric coated 81 milliGRAM(s) Oral daily  atorvastatin 80 milliGRAM(s) Oral at bedtime  azithromycin  IVPB 500 milliGRAM(s) IV Intermittent every 24 hours  cefTRIAXone   IVPB 2000 milliGRAM(s) IV Intermittent every 24 hours  chlorhexidine 4% Liquid 1 Application(s) Topical daily  dexAMETHasone  Injectable 6 milliGRAM(s) IV Push daily  dextrose 40% Gel 15 Gram(s) Oral once  dextrose 5%. 1000 milliLiter(s) IV Continuous <Continuous>  dextrose 5%. 1000 milliLiter(s) IV Continuous <Continuous>  dextrose 50% Injectable 25 Gram(s) IV Push once  dextrose 50% Injectable 12.5 Gram(s) IV Push once  dextrose 50% Injectable 25 Gram(s) IV Push once  glucagon  Injectable 1 milliGRAM(s) IntraMuscular once  heparin   Injectable 5000 Unit(s) SubCutaneous every 8 hours  influenza   Vaccine 0.5 milliLiter(s) IntraMuscular once  insulin lispro (ADMELOG) corrective regimen sliding scale   SubCutaneous three times a day before meals  melatonin 5 milliGRAM(s) Oral at bedtime  metoprolol succinate ER 25 milliGRAM(s) Oral daily  polyethylene glycol 3350 17 Gram(s) Oral every 12 hours  senna 2 Tablet(s) Oral at bedtime  sodium bicarbonate 650 milliGRAM(s) Oral three times a day  sodium zirconium cyclosilicate 10 Gram(s) Oral every 8 hours    PRN MEDICATIONS  guaifenesin/dextromethorphan  Syrup 5 milliLiter(s) Oral every 6 hours PRN    VITALS:   T(F): 96, Max: 96.6 (21 @ 13:58)  HR: 63 (63 - 84)  BP: 103/70 (103/70 - 127/66)  RR: 20 (19 - 20)  SpO2: 99% (93% - 99%) on NRB    LABS:                        13.1   7.49  )-----------( 163      ( 22 Mar 2021 08:03 )             38.3     03    132<L>  |  104  |  111<HH>  ----------------------------<  153<H>  5.2<H>   |  12<L>  |  3.4<H>    Ca    7.2<L>      23 Mar 2021 00:17  Mg     2.7         TPro  5.9<L>  /  Alb  3.4<L>  /  TBili  0.4  /  DBili  0.2  /  AST  81<H>  /  ALT  55<H>  /  AlkPhos  48      PT/INR - ( 22 Mar 2021 08:03 )   PT: 11.20 sec;   INR: 0.97 ratio           Urinalysis Basic - ( 23 Mar 2021 09:35 )    Color: Yellow / Appearance: Clear / S.016 / pH: x  Gluc: x / Ketone: Negative  / Bili: Negative / Urobili: <2 mg/dL   Blood: x / Protein: 300 mg/dL / Nitrite: Negative   Leuk Esterase: Negative / RBC: 3 /HPF / WBC 3 /HPF   Sq Epi: x / Non Sq Epi: 1 /HPF / Bacteria: Negative        Lactate, Blood: 1.4 mmol/L (21 @ 16:00)              21 @ 07:  -  21 @ 07:00  --------------------------------------------------------  IN: 1120 mL / OUT: 1675 mL / NET: -555 mL    21 @ 07:  -  21 @ 10:48  --------------------------------------------------------  IN: 564 mL / OUT: 325 mL / NET: 239 mL    PHYSICAL EXAM:  GEN: NAD, comfortable  LUNGS: CTAB, b/l crackles   HEART: RRR, s1 and s2 appreciated, no m/r/g  ABD: soft, NT/ND, +BS  EXT: b/l AURA, legs wrapped  NEURO: AAOX3

## 2021-03-23 NOTE — PROGRESS NOTE ADULT - ASSESSMENT
70 year old male patient with CKD, HFrEF, HTN, DM, CAD, and DM who presented on 03/19 with few days of shortness of breath, found to have COVID pneumonia, admitted for hypoxic RF secondary to COVID pneumonia. Currently hemodynamically stable.    Acute Hypoxemic Respiratory Failure Secondary to COVID Pneumonia  * SARS-COV2: positive 03/19  * COVID Antibody pending  * Chest X Ray: increase b/l opacities  * CT angio chest AP IC PE Protocol (03/19) no PE, bilateral groundglass opacities, enlarged thoracic lymph nodes  * Markers as below  - Infectious Disease and Pulmonary team are on board  - increase in o2 demands overnight likley secondary to volume overload s/p 1 dose of lasix  - blood cx negative  - s/p 1 dose of RDV, stopped secondary to low GFR  - CRP 30  - c/w dexa  - COVID therapy:  - Continue IV Azithromcyin 500mg QD (03/19) and IV Rocephin 2g QD (03/19) procal 1.31  - Anticoagulation Heparin 5000 Q8h. Duplex venous LE 03/19 no DVT      SAGE on CKD  * Baseline Cr 1.5  * Likely RTA Type IV RO obstruction VS contrast-induced VS COVID-related SAGE on CKD as per renal  - Placed a rivera catheter 03/21  - mat give lasix to drive urinary output in case it is needed   - Monitor K and HCO3 QD. started on lokelma and bicarb  - nephro following  - CT a/p negative for hydro    HTN  * Home meds: Entresto 97, 103mg BID, Lasix 20mg 2x/week, Toprol 25mg QD  - hold entresto and lasix given SAGE  - Monitor BP   - Continue Toprol 25mg QD    DM II  * Hba1c 6.9 03/20  * No home meds  - Monitor POCT premeals and at bedtime  - No need for insulin for now      Dyslipidemia  CAD  * Lipid profile 03/20 chol 87, TG 64, HDL 41, LDL 38  * Home meds: Aspirin 81mg QD, Atorvastatin 80mg QD, Entresto 90, 103mg BID, Ezetimibe 10mg QD, Toprol 25mg QD  * ECG 03/19 NSR, L axis deviation, LAFB  * Troponin not uptrending, likely secondary to kidney injury  - Continue Aspirin 81mg QD  - Continue Atorvastatin 80mg QD  - Continue Toprol 25mg QD       HFrEF  * Last TTE not present in chart  * Home meds: Lasix 20mg 2x/week, Entresto 97,103mg BID, Toprol 25mg QD  * Pro-BNP (03/19) 0598  - Monitor accurate intake/output  - Monitor daily weight     Others  - DVT Prophylaxis Heparin 5000 Q8h. Duplex venous LE 03/19 no DVT  - GI Prophylaxis: not on Pantoprazole 40mg PO QD  - Diet: DASH/TLC  - Code Status: Full   70 year old male patient with CKD, HFrEF, HTN, DM, CAD, and DM who presented on 03/19 with few days of shortness of breath, found to have COVID pneumonia, admitted for hypoxic RF secondary to COVID pneumonia. Currently hemodynamically stable.    Acute Hypoxemic Respiratory Failure Secondary to COVID Pneumonia  * SARS-COV2: positive 03/19  * COVID Antibody pending  * Chest X Ray: increased b/l opacities  * CT angio chest AP IC PE Protocol (03/19) no PE, bilateral groundglass opacities, enlarged thoracic lymph nodes  * Markers as below  - Infectious Disease and Pulmonary team are on board  - increase in O2 demands overnight likely secondary to volume overload s/p 1 dose of lasix  - blood cx negative  - s/p 1 dose of RDV, stopped secondary to low GFR  - CRP 30  - c/w dexa  - COVID therapy:  - Continue IV Azithromcyin 500mg QD (03/19) and IV Rocephin 2g QD (03/19) procal 1.31  - Anticoagulation Heparin 5000 Q8h. Duplex venous LE 03/19 no DVT      SAGE on CKD  * Baseline Cr 1.5  * Likely RTA Type IV RO obstruction VS contrast-induced VS COVID-related SAGE on CKD as per renal  - Placed a rivera catheter 03/21  - mat give lasix to drive urinary output in case it is needed   - Monitor K and HCO3 QD. started on lokelma and bicarb  - nephro following  - CT a/p negative for hydro    HTN  * Home meds: Entresto 97, 103mg BID, Lasix 20mg 2x/week, Toprol 25mg QD  - hold entresto and lasix given SAGE  - Monitor BP   - Continue Toprol 25mg QD    DM II  * Hba1c 6.9 03/20  * No home meds  - Monitor POCT premeals and at bedtime  - No need for insulin for now      Dyslipidemia  CAD  * Lipid profile 03/20 chol 87, TG 64, HDL 41, LDL 38  * Home meds: Aspirin 81mg QD, Atorvastatin 80mg QD, Entresto 90, 103mg BID, Ezetimibe 10mg QD, Toprol 25mg QD  * ECG 03/19 NSR, L axis deviation, LAFB  * Troponin not uptrending, likely secondary to kidney injury  - Continue Aspirin 81mg QD  - Continue Atorvastatin 80mg QD  - Continue Toprol 25mg QD       HFrEF  * Last TTE not present in chart  * Home meds: Lasix 20mg 2x/week, Entresto 97,103mg BID, Toprol 25mg QD  * Pro-BNP (03/19) 8348  - Monitor accurate intake/output  - Monitor daily weight     Others  - DVT Prophylaxis Heparin 5000 Q8h. Duplex venous LE 03/19 no DVT  - GI Prophylaxis: not on Pantoprazole 40mg PO QD  - Diet: DASH/TLC  - Code Status: Full

## 2021-03-23 NOTE — PROGRESS NOTE ADULT - SUBJECTIVE AND OBJECTIVE BOX
[place] [facility] FOLLOW UP NOTE  --------------------------------------------------------------------------------  24 hour events/subjective: Patient examined. Appears comfortable.    PAST HISTORY  --------------------------------------------------------------------------------  No significant changes to PMH, PSH, FHx, SHx, unless otherwise noted    ALLERGIES & MEDICATIONS  --------------------------------------------------------------------------------  Allergies    ACE inhibitors (Angioedema)    Standing Inpatient Medications  aspirin enteric coated 81 milliGRAM(s) Oral daily  atorvastatin 80 milliGRAM(s) Oral at bedtime  azithromycin  IVPB 500 milliGRAM(s) IV Intermittent every 24 hours  cefTRIAXone   IVPB 2000 milliGRAM(s) IV Intermittent every 24 hours  chlorhexidine 4% Liquid 1 Application(s) Topical daily  dexAMETHasone  Injectable 6 milliGRAM(s) IV Push daily  dextrose 40% Gel 15 Gram(s) Oral once  dextrose 5%. 1000 milliLiter(s) IV Continuous <Continuous>  dextrose 5%. 1000 milliLiter(s) IV Continuous <Continuous>  dextrose 50% Injectable 25 Gram(s) IV Push once  dextrose 50% Injectable 12.5 Gram(s) IV Push once  dextrose 50% Injectable 25 Gram(s) IV Push once  glucagon  Injectable 1 milliGRAM(s) IntraMuscular once  heparin   Injectable 5000 Unit(s) SubCutaneous every 8 hours  influenza   Vaccine 0.5 milliLiter(s) IntraMuscular once  insulin lispro (ADMELOG) corrective regimen sliding scale   SubCutaneous three times a day before meals  melatonin 5 milliGRAM(s) Oral at bedtime  metoprolol succinate ER 25 milliGRAM(s) Oral daily  polyethylene glycol 3350 17 Gram(s) Oral every 12 hours  senna 2 Tablet(s) Oral at bedtime  sodium bicarbonate 650 milliGRAM(s) Oral three times a day  sodium zirconium cyclosilicate 10 Gram(s) Oral every 8 hours    PRN Inpatient Medications  guaifenesin/dextromethorphan  Syrup 5 milliLiter(s) Oral every 6 hours PRN    VITALS/PHYSICAL EXAM  --------------------------------------------------------------------------------  T(C): 36.1 (03-23-21 @ 13:00), Max: 36.1 (03-23-21 @ 13:00)  HR: 70 (03-23-21 @ 13:00) (63 - 84)  BP: 102/80 (03-23-21 @ 13:00) (102/80 - 127/66)  RR: 20 (03-23-21 @ 13:00) (19 - 20)  SpO2: 99% (03-23-21 @ 11:25) (93% - 99%)    03-22-21 @ 07:01  -  03-23-21 @ 07:00  --------------------------------------------------------  IN: 1120 mL / OUT: 1675 mL / NET: -555 mL    03-23-21 @ 07:01  -  03-23-21 @ 13:46  --------------------------------------------------------  IN: 801 mL / OUT: 500 mL / NET: 301 mL    Physical Exam:  	Gen: NAD  	Pulm:  B/L rales  	CV: RRR, S1S2  	Abd: +BS, soft, nontender/nondistended  	: No suprapubic tenderness  	LE: Warm,  edema    LABS/STUDIES  --------------------------------------------------------------------------------              12.5   5.90  >-----------<  170      [03-23-21 @ 10:01]              37.4     132  |  102  |  119  ----------------------------<  233      [03-23-21 @ 10:01]  5.3   |  14  |  3.3        Ca     7.1     [03-23-21 @ 10:01]      Mg     2.7     [03-22-21 @ 08:03]    TPro  5.5  /  Alb  3.1  /  TBili  0.4  /  DBili  x   /  AST  58  /  ALT  54  /  AlkPhos  53  [03-23-21 @ 10:01]    PT/INR: PT 11.20, INR 0.97       [03-22-21 @ 08:03]    Creatinine Trend:  SCr 3.3 [03-23 @ 10:01]  SCr 3.4 [03-23 @ 00:17]  SCr 3.4 [03-22 @ 16:00]  SCr 3.4 [03-22 @ 08:03]  SCr 3.4 [03-22 @ 00:50]    Urinalysis - [03-23-21 @ 09:35]      Color Yellow / Appearance Clear / SG 1.016 / pH 6.0      Gluc Negative / Ketone Negative  / Bili Negative / Urobili <2 mg/dL       Blood Small / Protein 300 mg/dL / Leuk Est Negative / Nitrite Negative      RBC 3 / WBC 3 / Hyaline 3 / Gran  / Sq Epi  / Non Sq Epi 1 / Bacteria Negative    Urine Creatinine 110      [03-23-21 @ 09:35]  Urine Sodium <20.0      [03-23-21 @ 09:35]  Urine Osmolality 339      [03-23-21 @ 09:35]    Ferritin 1478      [03-19-21 @ 09:46]  HbA1c 6.1      [11-23-19 @ 08:02]  TSH 0.46      [03-20-21 @ 04:30]  Lipid: chol 87, TG 64, HDL 41, LDL --      [03-20-21 @ 04:30]

## 2021-03-24 NOTE — PROGRESS NOTE ADULT - ASSESSMENT
70 year old male patient with CKD, HFrEF, HTN, DM, CAD, and DM who presented on 03/19 with few days of shortness of breath, found to have COVID pneumonia, admitted for hypoxic RF secondary to COVID pneumonia. Currently hemodynamically stable.    Acute Hypoxemic Respiratory Failure Secondary to COVID Pneumonia  * SARS-COV2: positive 03/19  * COVID Antibody positive, no indications for plasma  * CT angio chest AP IC PE Protocol (03/19) no PE, bilateral groundglass opacities, enlarged thoracic lymph nodes  * Markers as below  - Infectious Disease and Pulmonary team are on board  - blood cx negative  - s/p 1 dose of RDV, stopped secondary to low GFR  - CRP 30, no toci given  - c/w dexa  - procal 1.31 s/p course of azithro and ceftriaxone  - Anticoagulation Heparin 5000 Q8h. Duplex venous LE 03/19 no DVT  - on NRFM saturating 93, patient was on 6 L NC on 3/22. increase in oxygen demands secondary to ATN and volume overload, started on lasix    SAGE on CKD  * Baseline Cr 1.5, peaked at 3.5 now downtrending  * Likely ATN non oliguric   - Placed a rivera catheter 03/21  - increase in o2 demands, will start lasix 60 mg iv daily  - Monitor K and HCO3 QD. started on lokelma and bicarb  - nephro following  - CT a/p negative for hydro  - no need for dialysis    HTN  * Home meds: Entresto 97, 103mg BID, Lasix 20mg 2x/week, Toprol 25mg QD  - hold entresto given SAGE  - Monitor BP   - Continue Toprol 25mg QD    DM II  * Hba1c 6.9 03/20  * No home meds  - Monitor POCT premeals and at bedtime  - BS<180, no need for insulin  - Monitor BS    Dyslipidemia  CAD  * Lipid profile 03/20 chol 87, TG 64, HDL 41, LDL 38  * Home meds: Aspirin 81mg QD, Atorvastatin 80mg QD, Entresto 90, 103mg BID, Ezetimibe 10mg QD, Toprol 25mg QD  * ECG 03/19 NSR, L axis deviation, LAFB  * Troponin not uptrending, likely secondary to kidney injury  - Continue Aspirin 81mg QD  - Continue Atorvastatin 80mg QD  - Continue Toprol 25mg QD       HFrEF  * Last TTE not present in chart  * Home meds: Lasix 20mg 2x/week, Entresto 97,103mg BID, Toprol 25mg QD  * Pro-BNP (03/19) 2718  - Monitor accurate intake/output  - Monitor daily weight     Others  - DVT Prophylaxis Heparin 5000 Q8h. Duplex venous LE 03/19 no DVT  - GI Prophylaxis: not on Pantoprazole 40mg PO QD  - Diet: DASH/TLC  - Code Status: Full

## 2021-03-24 NOTE — PROGRESS NOTE ADULT - SUBJECTIVE AND OBJECTIVE BOX
SUBJECTIVE:    Patient is a 70y old Male who presents with a chief complaint of SOB (23 Mar 2021 13:45)    Overnight Events: Patient is stable, no acute events overnight.  VS are stable, still on NRFM saturating well.  No major complaints.    PAST MEDICAL & SURGICAL HISTORY  Diabetes    Dyslipidemia    Hypertension    Heart failure    Diverticulitis    S/P partial resection of colon      SOCIAL HISTORY:  Negative for smoking/alcohol/drug use.     ALLERGIES:  ACE inhibitors (Angioedema)    MEDICATIONS:  STANDING MEDICATIONS  aspirin enteric coated 81 milliGRAM(s) Oral daily  atorvastatin 80 milliGRAM(s) Oral at bedtime  azithromycin  IVPB 500 milliGRAM(s) IV Intermittent every 24 hours  cefTRIAXone   IVPB 2000 milliGRAM(s) IV Intermittent every 24 hours  chlorhexidine 4% Liquid 1 Application(s) Topical daily  dexAMETHasone  Injectable 6 milliGRAM(s) IV Push daily  dextrose 40% Gel 15 Gram(s) Oral once  dextrose 5%. 1000 milliLiter(s) IV Continuous <Continuous>  dextrose 5%. 1000 milliLiter(s) IV Continuous <Continuous>  dextrose 50% Injectable 25 Gram(s) IV Push once  dextrose 50% Injectable 12.5 Gram(s) IV Push once  dextrose 50% Injectable 25 Gram(s) IV Push once  furosemide   Injectable 60 milliGRAM(s) IV Push daily  glucagon  Injectable 1 milliGRAM(s) IntraMuscular once  heparin   Injectable 5000 Unit(s) SubCutaneous every 8 hours  influenza   Vaccine 0.5 milliLiter(s) IntraMuscular once  insulin lispro (ADMELOG) corrective regimen sliding scale   SubCutaneous three times a day before meals  melatonin 5 milliGRAM(s) Oral at bedtime  metoprolol succinate ER 25 milliGRAM(s) Oral daily  polyethylene glycol 3350 17 Gram(s) Oral every 12 hours  senna 2 Tablet(s) Oral at bedtime  sodium bicarbonate 650 milliGRAM(s) Oral three times a day  sodium zirconium cyclosilicate 10 Gram(s) Oral every 8 hours    PRN MEDICATIONS  guaifenesin/dextromethorphan  Syrup 5 milliLiter(s) Oral every 6 hours PRN    VITALS:   T(F): 96.2, Max: 96.9 (21 @ 13:00)  HR: 65 (62 - 88)  BP: 115/66 (102/80 - 124/68)  RR: 20 (18 - 20)  SpO2: 94% (94% - 99%)    LABS:                        13.0   8.53  )-----------( 204      ( 24 Mar 2021 08:44 )             37.3         137  |  104  |  x   ----------------------------<  144<H>  5.4<H>   |  18  |  2.9<H>    Ca    7.5<L>      24 Mar 2021 08:44    TPro  6.0  /  Alb  3.3<L>  /  TBili  0.4  /  DBili  0.2  /  AST  75<H>  /  ALT  68<H>  /  AlkPhos  69      PT/INR - ( 24 Mar 2021 08:44 )   PT: 11.00 sec;   INR: 0.96 ratio           Urinalysis Basic - ( 23 Mar 2021 09:35 )    Color: Yellow / Appearance: Clear / S.016 / pH: x  Gluc: x / Ketone: Negative  / Bili: Negative / Urobili: <2 mg/dL   Blood: x / Protein: 300 mg/dL / Nitrite: Negative   Leuk Esterase: Negative / RBC: 3 /HPF / WBC 3 /HPF   Sq Epi: x / Non Sq Epi: 1 /HPF / Bacteria: Negative                    21 @ 07:  -  21 @ 07:00  --------------------------------------------------------  IN: 951 mL / OUT: 650 mL / NET: 301 mL    21 @ 07:21 @ 10:54  --------------------------------------------------------  IN: 240 mL / OUT: 400 mL / NET: -160 mL      PHYSICAL EXAM:  GEN: NAD, comfortable  LUNGS: CTAB, b/l crackles   HEART: RRR, s1 and s2 appreciated, no m/r/g  ABD: soft, NT/ND, +BS  EXT: b/l AURA  NEURO: AAOX3

## 2021-03-24 NOTE — PROGRESS NOTE ADULT - SUBJECTIVE AND OBJECTIVE BOX
[place] [facility] FOLLOW UP NOTE  --------------------------------------------------------------------------------  24 hour events/subjective: Patient examined. Appears comfortable.    PAST HISTORY  --------------------------------------------------------------------------------  No significant changes to PMH, PSH, FHx, SHx, unless otherwise noted    ALLERGIES & MEDICATIONS  --------------------------------------------------------------------------------  Allergies    ACE inhibitors (Angioedema)    Intolerances      Standing Inpatient Medications  aspirin enteric coated 81 milliGRAM(s) Oral daily  atorvastatin 80 milliGRAM(s) Oral at bedtime  chlorhexidine 4% Liquid 1 Application(s) Topical daily  dexAMETHasone  Injectable 6 milliGRAM(s) IV Push daily  dextrose 40% Gel 15 Gram(s) Oral once  dextrose 5%. 1000 milliLiter(s) IV Continuous <Continuous>  dextrose 5%. 1000 milliLiter(s) IV Continuous <Continuous>  dextrose 50% Injectable 25 Gram(s) IV Push once  dextrose 50% Injectable 12.5 Gram(s) IV Push once  dextrose 50% Injectable 25 Gram(s) IV Push once  furosemide   Injectable 60 milliGRAM(s) IV Push daily  glucagon  Injectable 1 milliGRAM(s) IntraMuscular once  heparin   Injectable 5000 Unit(s) SubCutaneous every 8 hours  influenza   Vaccine 0.5 milliLiter(s) IntraMuscular once  insulin lispro (ADMELOG) corrective regimen sliding scale   SubCutaneous three times a day before meals  melatonin 5 milliGRAM(s) Oral at bedtime  metoprolol succinate ER 25 milliGRAM(s) Oral daily  polyethylene glycol 3350 17 Gram(s) Oral every 12 hours  senna 2 Tablet(s) Oral at bedtime  sodium bicarbonate 650 milliGRAM(s) Oral three times a day  sodium zirconium cyclosilicate 10 Gram(s) Oral every 8 hours    PRN Inpatient Medications  guaifenesin/dextromethorphan  Syrup 5 milliLiter(s) Oral every 6 hours PRN    VITALS/PHYSICAL EXAM  --------------------------------------------------------------------------------  T(C): 35.7 (03-24-21 @ 00:57), Max: 36.1 (03-23-21 @ 13:00)  HR: 65 (03-24-21 @ 09:24) (62 - 88)  BP: 115/66 (03-24-21 @ 09:24) (102/80 - 124/68)  RR: 20 (03-24-21 @ 06:32) (18 - 20)  SpO2: 94% (03-24-21 @ 07:56) (94% - 98%)    03-23-21 @ 07:01  -  03-24-21 @ 07:00  --------------------------------------------------------  IN: 951 mL / OUT: 650 mL / NET: 301 mL    03-24-21 @ 07:01  -  03-24-21 @ 11:35  --------------------------------------------------------  IN: 240 mL / OUT: 400 mL / NET: -160 mL    Physical Exam:  	Gen: NAD  	Pulm: CTA B/L  	CV: RRR, S1S2  	Abd: +BS, soft, nontender/nondistended  	: No suprapubic tenderness  	LE: Warm, FROM, no clubbing, intact strength; no edema  	Vascular access:    LABS/STUDIES  --------------------------------------------------------------------------------              13.0   8.53  >-----------<  204      [03-24-21 @ 08:44]              37.3     137  |  104  |  123  ----------------------------<  144      [03-24-21 @ 08:44]  5.4   |  18  |  2.9        Ca     7.5     [03-24-21 @ 08:44]    TPro  6.0  /  Alb  3.3  /  TBili  0.4  /  DBili  0.2  /  AST  75  /  ALT  68  /  AlkPhos  69  [03-24-21 @ 08:44]    PT/INR: PT 11.00, INR 0.96       [03-24-21 @ 08:44]    Creatinine Trend:  SCr 2.9 [03-24 @ 08:44]  SCr 3.3 [03-23 @ 10:01]  SCr 3.4 [03-23 @ 00:17]  SCr 3.4 [03-22 @ 16:00]  SCr 3.4 [03-22 @ 08:03]    Urinalysis - [03-23-21 @ 09:35]      Color Yellow / Appearance Clear / SG 1.016 / pH 6.0      Gluc Negative / Ketone Negative  / Bili Negative / Urobili <2 mg/dL       Blood Small / Protein 300 mg/dL / Leuk Est Negative / Nitrite Negative      RBC 3 / WBC 3 / Hyaline 3 / Gran  / Sq Epi  / Non Sq Epi 1 / Bacteria Negative    Urine Creatinine 110      [03-23-21 @ 09:35]  Urine Sodium <20.0      [03-23-21 @ 09:35]  Urine Urea Nitrogen 588      [03-23-21 @ 09:35]  Urine Osmolality 339      [03-23-21 @ 09:35]    Ferritin 1478      [03-19-21 @ 09:46]  HbA1c 6.1      [11-23-19 @ 08:02]  TSH 0.46      [03-20-21 @ 04:30]  Lipid: chol 87, TG 64, HDL 41, LDL --      [03-20-21 @ 04:30]

## 2021-03-24 NOTE — PROGRESS NOTE ADULT - ASSESSMENT
Acute severe COVID pneumonia  Acute hypoxemic respiratory failure  Acute on CKD (baseline serum creatinine 1.5 mg/dL), COVID vs. contrast as reason for acute kidney injury  Systolic CHF - usually NYHA Class 2-3 now Class 4    Suggest:    Minimize fluid intake  Continue PO bicarb  Continue Lokelma  Can use IV Lasix to drive urine output but watch blood pressure closely  No indication for dialysis, creatinine appears to be improving

## 2021-03-25 NOTE — PROGRESS NOTE ADULT - ASSESSMENT
a/p  #Acute hypoxic respiratory failure 2/2 Covid-19 viral pneumonia  -continue isolation precuations  -remains on HF (50L/70%)---titrate down o2 needs as tolerated  -duplex negative  -repeat DDimer today--->if elevated will repeat venous duplex to r/o dvt  -finish course azithro/ceftriaxone (elevated procal---suspect 2/2 bacterial pneumonia)  -RDV stopped 2/2 SAGE  -continue dexamethasone 6 mg iv daily    #SAGE 2/2 ATN  -cont to monitor bmp closely  -nephrology following  -cr improving (today 2.3)  -lasix iv 60 mg daily--monitor i/o and urine output (CXR today improving)  -cont lokelma and bicarb    #PVD  -vascular consult for bilateral  -duplex negative for dvt on admission  -repeat ddimer---If elevated repeat duplex    DVT/GI ppx  guarded prognosis     #Progress Note Handoff  Pending (specify): remains on NCHF  Family discussion: d/w patient himself   Disposition: Home_ x (when medically stable)

## 2021-03-25 NOTE — PROGRESS NOTE ADULT - ASSESSMENT
70 year old male patient with CKD, HFrEF, HTN, DM, CAD, and DM who presented on 03/19 with few days of shortness of breath, found to have COVID pneumonia, admitted for hypoxic RF secondary to COVID pneumonia. Currently hemodynamically stable.    Acute Hypoxemic Respiratory Failure Secondary to COVID Pneumonia  * SARS-COV2: positive 03/19  * COVID Antibody positive, no indications for plasma  * CT angio chest AP IC PE Protocol (03/19) no PE, bilateral groundglass opacities, enlarged thoracic lymph nodes  - Infectious Disease and Pulmonary team are on board  - blood cx negative  - s/p 1 dose of RDV, stopped secondary to low GFR  - CRP 30, no toci given  - c/w dexa  - procal 1.31 s/p course of azithro and ceftriaxone  - Anticoagulation Heparin 5000 Q8h. Duplex venous LE 03/19 no DVT  - patient was on 6 L NC on 3/22, increase in oxygen demands and started on 3/25 on HFNC 50 L 70% Fio2  - Repeated CXR showed improvement of his opacities  - Will get D-dimer if positive will order [facility] duplex  - c/w lasix 60 mg daily    SAGE on CKD  * Baseline Cr 1.5, peaked at 3.5 now downtrending  * Likely ATN non oliguric   - Placed a rivera catheter 03/21  - increase in o2 demands, will start lasix 60 mg iv daily  - Monitor K and HCO3 QD. started on lokelma and bicarb  - nephro following  - CT a/p negative for hydro  - no need for dialysis    HTN  * Home meds: Entresto 97, 103mg BID, Lasix 20mg 2x/week, Toprol 25mg QD  - hold entresto given SAGE  - Monitor BP   - Continue Toprol 25mg QD    DM II  * Hba1c 6.9 03/20  * No home meds  - Monitor POCT premeals and at bedtime  - BS<180, no need for insulin  - Monitor BS    Dyslipidemia  CAD  * Lipid profile 03/20 chol 87, TG 64, HDL 41, LDL 38  * Home meds: Aspirin 81mg QD, Atorvastatin 80mg QD, Entresto 90, 103mg BID, Ezetimibe 10mg QD, Toprol 25mg QD  * ECG 03/19 NSR, L axis deviation, LAFB  * Troponin not uptrending, likely secondary to kidney injury  - Continue Aspirin 81mg QD  - Continue Atorvastatin 80mg QD  - Continue Toprol 25mg QD       HFrEF  * Last TTE not present in chart  * Home meds: Lasix 20mg 2x/week, Entresto 97,103mg BID, Toprol 25mg QD  - c/w lasix iv   * Pro-BNP (03/19) 9479  - Monitor accurate intake/output  - Monitor daily weight     Others  - DVT Prophylaxis Heparin 5000 Q8h. Duplex venous LE 03/19 no DVT  - GI Prophylaxis: not on Pantoprazole 40mg PO QD  - Diet: DASH/TLC  - Code Status: Full

## 2021-03-25 NOTE — PROGRESS NOTE ADULT - SUBJECTIVE AND OBJECTIVE BOX
[place] NEPHROLOGY FOLLOW UP NOTE  --------------------------------------------------------------------------------  24 hour events/subjective: Patient examined. Appears comfortable.    PAST HISTORY  --------------------------------------------------------------------------------  No significant changes to PMH, PSH, FHx, SHx, unless otherwise noted    ALLERGIES & MEDICATIONS  --------------------------------------------------------------------------------  Allergies    ACE inhibitors (Angioedema)    Intolerances      Standing Inpatient Medications  aspirin enteric coated 81 milliGRAM(s) Oral daily  atorvastatin 80 milliGRAM(s) Oral at bedtime  chlorhexidine 4% Liquid 1 Application(s) Topical daily  dexAMETHasone  Injectable 6 milliGRAM(s) IV Push daily  dextrose 40% Gel 15 Gram(s) Oral once  dextrose 5%. 1000 milliLiter(s) IV Continuous <Continuous>  dextrose 5%. 1000 milliLiter(s) IV Continuous <Continuous>  dextrose 50% Injectable 25 Gram(s) IV Push once  dextrose 50% Injectable 12.5 Gram(s) IV Push once  dextrose 50% Injectable 25 Gram(s) IV Push once  furosemide   Injectable 60 milliGRAM(s) IV Push daily  glucagon  Injectable 1 milliGRAM(s) IntraMuscular once  heparin   Injectable 5000 Unit(s) SubCutaneous every 8 hours  influenza   Vaccine 0.5 milliLiter(s) IntraMuscular once  insulin lispro (ADMELOG) corrective regimen sliding scale   SubCutaneous three times a day before meals  melatonin 5 milliGRAM(s) Oral at bedtime  metoprolol succinate ER 25 milliGRAM(s) Oral daily  polyethylene glycol 3350 17 Gram(s) Oral every 12 hours  senna 2 Tablet(s) Oral at bedtime  sodium bicarbonate 650 milliGRAM(s) Oral three times a day  sodium zirconium cyclosilicate 10 Gram(s) Oral every 8 hours    PRN Inpatient Medications  guaifenesin/dextromethorphan  Syrup 5 milliLiter(s) Oral every 6 hours PRN    VITALS/PHYSICAL EXAM  --------------------------------------------------------------------------------  T(C): 35.8 (03-25-21 @ 06:11), Max: 35.8 (03-24-21 @ 13:25)  HR: 69 (03-25-21 @ 06:11) (66 - 82)  BP: 135/81 (03-25-21 @ 06:11) (113/71 - 135/81)  RR: 18 (03-25-21 @ 06:11) (18 - 20)  SpO2: 93% (03-25-21 @ 11:25) (93% - 95%)    03-24-21 @ 07:01  -  03-25-21 @ 07:00  --------------------------------------------------------  IN: 1300 mL / OUT: 3150 mL / NET: -1850 mL      Physical Exam:  	Gen: NAD  	Pulm: B/L scant rales  	CV: RRR, S1S2  	Abd: +BS, soft, nontender/nondistended  	: No suprapubic tenderness  	LE: Warm,  edema    LABS/STUDIES  --------------------------------------------------------------------------------              12.8   7.81  >-----------<  201      [03-25-21 @ 06:06]              37.4     138  |  106  |  120  ----------------------------<  145      [03-25-21 @ 06:06]  4.9   |  18  |  2.3        Ca     7.6     [03-25-21 @ 06:06]    TPro  6.0  /  Alb  3.4  /  TBili  0.5  /  DBili  x   /  AST  73  /  ALT  79  /  AlkPhos  73  [03-25-21 @ 06:06]    PT/INR: PT 11.00, INR 0.96       [03-24-21 @ 08:44]      Creatinine Trend:  SCr 2.3 [03-25 @ 06:06]  SCr 2.9 [03-24 @ 08:44]  SCr 3.3 [03-23 @ 10:01]  SCr 3.4 [03-23 @ 00:17]  SCr 3.4 [03-22 @ 16:00]    Urinalysis - [03-23-21 @ 09:35]      Color Yellow / Appearance Clear / SG 1.016 / pH 6.0      Gluc Negative / Ketone Negative  / Bili Negative / Urobili <2 mg/dL       Blood Small / Protein 300 mg/dL / Leuk Est Negative / Nitrite Negative      RBC 3 / WBC 3 / Hyaline 3 / Gran  / Sq Epi  / Non Sq Epi 1 / Bacteria Negative    Urine Creatinine 110      [03-23-21 @ 09:35]  Urine Sodium <20.0      [03-23-21 @ 09:35]  Urine Urea Nitrogen 588      [03-23-21 @ 09:35]  Urine Osmolality 339      [03-23-21 @ 09:35]    Ferritin 1478      [03-19-21 @ 09:46]  HbA1c 6.1      [11-23-19 @ 08:02]  TSH 0.46      [03-20-21 @ 04:30]  Lipid: chol 87, TG 64, HDL 41, LDL --      [03-20-21 @ 04:30]

## 2021-03-25 NOTE — PROGRESS NOTE ADULT - ASSESSMENT
Acute severe COVID pneumonia  Acute hypoxemic respiratory failure  Acute on CKD (baseline serum creatinine 1.5 mg/dL), COVID vs. contrast as reason for acute kidney injury  Systolic CHF - usually NYHA Class 2-3 now Class 4    Suggest:    Minimize fluid intake  Continue PO bicarb  Decrease Lokelma to daily  Can use IV Lasix to drive urine output but watch blood pressure closely  No indication for dialysis, creatinine appears to be improving

## 2021-03-25 NOTE — CONSULT NOTE ADULT - SUBJECTIVE AND OBJECTIVE BOX
GENERAL SURGERY CONSULT NOTE    Patient: HAMMAD KING , 70y ([birth year])Male   MRN: [id number]  Location: [place] [address] [id number]  Visit: 03-19-21 Inpatient  Date: 03-25-21 @ 14:41    HPI:  69 yo M with PMHx of CKD (Unknown baseline), HFrEF (On Entresto), HTN, HLD, DM (A1c 6.5 per patient), LGIB secondary to Diverticulitis s/p colon resection (at [facility] about 6.5 years ago) and CAD presenting today with SOB. Patient states he has had progressive SOB x 6 weeks however it has worsened in the last 2 weeks. Patient states he developed chills, and productive cough x 2 days. Endorses diarrhea and diffuse abdominal discomfort x 6 weeks as well. Denies chest pain though    Vital Signs Last 24 Hrs  T(F): 99.5 (19 Mar 2021 12:07), Max: 102.3 (19 Mar 2021 08:53)  HR: 71 (19 Mar 2021 12:07) (71 - 93)  BP: 150/92 (19 Mar 2021 12:07) (136/84 - 150/92)  RR: 24 (19 Mar 2021 13:18) (20 - 24)  SpO2: 100% (19 Mar 2021 13:18) (92% - 100%)    In ED patient was received septic on admission (HR 93, Temp 102.3, RR 20, WBC <2k, COVID +) lactate 1.1  Was hypoxic to 84% with minimal exertion in the stretcher. Patient on high-lina at this time. Patient initially saturating at 96% on 3L NC; however later desatted requiring over 6L NC. Patient now on high-lina. No signs of right heart strain on POCUS.  CXR showed b/l opcities. Was unable to tolerate CTA ruled out PE.  Labs are significant for hyperkalemia 6.1, with tall T waves on my read, Calcium gluconate is ordered STAT and insulin push was already given by ER.  Dimer 562, pro-BNP 2718, Trop 0.18, Cr 2.9         (19 Mar 2021 14:40)      PAST MEDICAL & SURGICAL HISTORY:  Diabetes    Dyslipidemia    Hypertension    Heart failure    Diverticulitis    S/P partial resection of colon        Home Medications:  Aspir 81 oral delayed release tablet: 1 tab(s) orally once a day (19 Mar 2021 16:01)  atorvastatin 80 mg oral tablet: 1 tab(s) orally once a day (19 Mar 2021 16:01)  Entresto 97 mg-103 mg oral tablet: 1 tab(s) orally 2 times a day (19 Mar 2021 16:01)  ezetimibe 10 mg oral tablet: 1 tab(s) orally once a day (19 Mar 2021 16:01)  Lasix 20 mg oral tablet: 1 tab(s) orally 2 times a week (19 Mar 2021 16:01)  metoprolol succinate 25 mg oral tablet, extended release: 1 tab(s) orally once a day (19 Mar 2021 16:01)  Vitamin C 1000 mg oral tablet: 1 tab(s) orally once a day (19 Mar 2021 16:01)  Vitamin D3 5000 intl units oral tablet: 1 tab(s) orally once a day (19 Mar 2021 16:01)        VITALS:  T(F): 96.5 (03-25-21 @ 12:53), Max: 96.5 (03-25-21 @ 12:53)  HR: 70 (03-25-21 @ 12:53) (68 - 82)  BP: 128/64 (03-25-21 @ 12:53) (113/71 - 135/81)  RR: 18 (03-25-21 @ 12:53) (18 - 20)  SpO2: 93% (03-25-21 @ 11:25) (93% - 95%)    GEN: NAD, comfortable  LUNGS: CTAB, b/l basal crackles   HEART: RRR, s1 and s2 appreciated, no m/r/g  ABD: soft, NT/ND, +BS  EXT: b/l AURA  NEURO: AAOX3      MEDICATIONS  (STANDING):  aspirin enteric coated 81 milliGRAM(s) Oral daily  atorvastatin 80 milliGRAM(s) Oral at bedtime  chlorhexidine 4% Liquid 1 Application(s) Topical daily  dexAMETHasone  Injectable 6 milliGRAM(s) IV Push daily  dextrose 40% Gel 15 Gram(s) Oral once  dextrose 5%. 1000 milliLiter(s) (50 mL/Hr) IV Continuous <Continuous>  dextrose 5%. 1000 milliLiter(s) (100 mL/Hr) IV Continuous <Continuous>  dextrose 50% Injectable 25 Gram(s) IV Push once  dextrose 50% Injectable 12.5 Gram(s) IV Push once  dextrose 50% Injectable 25 Gram(s) IV Push once  furosemide   Injectable 60 milliGRAM(s) IV Push daily  glucagon  Injectable 1 milliGRAM(s) IntraMuscular once  heparin   Injectable 5000 Unit(s) SubCutaneous every 8 hours  influenza   Vaccine 0.5 milliLiter(s) IntraMuscular once  insulin lispro (ADMELOG) corrective regimen sliding scale   SubCutaneous three times a day before meals  melatonin 5 milliGRAM(s) Oral at bedtime  metoprolol succinate ER 25 milliGRAM(s) Oral daily  polyethylene glycol 3350 17 Gram(s) Oral every 12 hours  senna 2 Tablet(s) Oral at bedtime  sodium bicarbonate 650 milliGRAM(s) Oral three times a day  sodium zirconium cyclosilicate 10 Gram(s) Oral daily    MEDICATIONS  (PRN):  guaifenesin/dextromethorphan  Syrup 5 milliLiter(s) Oral every 6 hours PRN Cough      LAB/STUDIES:                        12.8   7.81  )-----------( 201      ( 25 Mar 2021 06:06 )             37.4     03-25    138  |  106  |  120<HH>  ----------------------------<  145<H>  4.9   |  18  |  2.3<H>    Ca    7.6<L>      25 Mar 2021 06:06    TPro  6.0  /  Alb  3.4<L>  /  TBili  0.5  /  DBili  x   /  AST  73<H>  /  ALT  79<H>  /  AlkPhos  73  03-25    PT/INR - ( 24 Mar 2021 08:44 )   PT: 11.00 sec;   INR: 0.96 ratio           LIVER FUNCTIONS - ( 25 Mar 2021 06:06 )  Alb: 3.4 g/dL / Pro: 6.0 g/dL / ALK PHOS: 73 U/L / ALT: 79 U/L / AST: 73 U/L / GGT: x                         IMAGING:    ASSESSMENT:  70yM w/ PMHx of  ***  who presented with ***. Physical exam findings, imaging, and labs as documented above.     PLAN:  -  -  -    Above plan discussed with  ***

## 2021-03-25 NOTE — CONSULT NOTE ADULT - ASSESSMENT
69 yo M with PMHx of CKD (Unknown baseline), HFrEF (On Entresto), HTN, HLD, DM (A1c 6.5 per patient), LGIB secondary to Diverticulitis s/p colon resection (at [facility] about 6.5 years ago) and CAD presenting today with SOB. Patient states he has had progressive SOB x 6 weeks however it has worsened in the last 2 weeks. Patient states he developed chills, and productive cough x 2 days. Endorses diarrhea and diffuse abdominal discomfort x 6 weeks as well. Denies chest pain though    vascular is consulted for venous stasis     plan   removed unna boots   dressing change with adoptics and kerlix   wound care

## 2021-03-25 NOTE — PROGRESS NOTE ADULT - SUBJECTIVE AND OBJECTIVE BOX
SUBJECTIVE:    Patient is a 70y old Male who presents with a chief complaint of SOB (24 Mar 2021 11:35)    Overnight Events: Patient is stable, no acute events overnight.  He is still on NRFM, desaturated while eating so he was switched to HFNC.  Complaining of fatigue and inability to sleep during nights.    PAST MEDICAL & SURGICAL HISTORY  Diabetes    Dyslipidemia    Hypertension    Heart failure    Diverticulitis    S/P partial resection of colon      SOCIAL HISTORY:  Negative for smoking/alcohol/drug use.     ALLERGIES:  ACE inhibitors (Angioedema)    MEDICATIONS:  STANDING MEDICATIONS  aspirin enteric coated 81 milliGRAM(s) Oral daily  atorvastatin 80 milliGRAM(s) Oral at bedtime  chlorhexidine 4% Liquid 1 Application(s) Topical daily  dexAMETHasone  Injectable 6 milliGRAM(s) IV Push daily  dextrose 40% Gel 15 Gram(s) Oral once  dextrose 5%. 1000 milliLiter(s) IV Continuous <Continuous>  dextrose 5%. 1000 milliLiter(s) IV Continuous <Continuous>  dextrose 50% Injectable 25 Gram(s) IV Push once  dextrose 50% Injectable 12.5 Gram(s) IV Push once  dextrose 50% Injectable 25 Gram(s) IV Push once  furosemide   Injectable 60 milliGRAM(s) IV Push daily  glucagon  Injectable 1 milliGRAM(s) IntraMuscular once  heparin   Injectable 5000 Unit(s) SubCutaneous every 8 hours  influenza   Vaccine 0.5 milliLiter(s) IntraMuscular once  insulin lispro (ADMELOG) corrective regimen sliding scale   SubCutaneous three times a day before meals  melatonin 5 milliGRAM(s) Oral at bedtime  metoprolol succinate ER 25 milliGRAM(s) Oral daily  polyethylene glycol 3350 17 Gram(s) Oral every 12 hours  senna 2 Tablet(s) Oral at bedtime  sodium bicarbonate 650 milliGRAM(s) Oral three times a day  sodium zirconium cyclosilicate 10 Gram(s) Oral every 8 hours    PRN MEDICATIONS  guaifenesin/dextromethorphan  Syrup 5 milliLiter(s) Oral every 6 hours PRN    VITALS:   T(F): 96.4, Max: 96.5 (03-24-21 @ 13:25)  HR: 69 (66 - 82)  BP: 135/81 (113/71 - 135/81)  RR: 18 (18 - 20)  SpO2: 94% (93% - 95%)    LABS:                        12.8   7.81  )-----------( 201      ( 25 Mar 2021 06:06 )             37.4     03-25    138  |  106  |  120<HH>  ----------------------------<  145<H>  4.9   |  18  |  2.3<H>    Ca    7.6<L>      25 Mar 2021 06:06    TPro  6.0  /  Alb  3.4<L>  /  TBili  0.5  /  DBili  x   /  AST  73<H>  /  ALT  79<H>  /  AlkPhos  73  03-25    PT/INR - ( 24 Mar 2021 08:44 )   PT: 11.00 sec;   INR: 0.96 ratio                           03-24-21 @ 07:01  -  03-25-21 @ 07:00  --------------------------------------------------------  IN: 1300 mL / OUT: 3150 mL / NET: -1850 mL        PHYSICAL EXAM:  GEN: NAD, comfortable  LUNGS: CTAB, b/l basal crackles   HEART: RRR, s1 and s2 appreciated, no m/r/g  ABD: soft, NT/ND, +BS  EXT: b/l AURA  NEURO: AAOX3

## 2021-03-26 NOTE — PROGRESS NOTE ADULT - SUBJECTIVE AND OBJECTIVE BOX
SUBJECTIVE:    Patient is a 70y old Male who presents with a chief complaint of SOB (26 Mar 2021 10:46)    Overnight Events: Patient is stable, no acute events overnight.  Still on HFNC 50 L 60% Fio2, saturating well.  Complaining of insomnia at night. VS are stable.    PAST MEDICAL & SURGICAL HISTORY  Diabetes    Dyslipidemia    Hypertension    Heart failure    Diverticulitis    S/P partial resection of colon      SOCIAL HISTORY:  Negative for smoking/alcohol/drug use.     ALLERGIES:  ACE inhibitors (Angioedema)    MEDICATIONS:  STANDING MEDICATIONS  aspirin enteric coated 81 milliGRAM(s) Oral daily  atorvastatin 80 milliGRAM(s) Oral at bedtime  chlorhexidine 4% Liquid 1 Application(s) Topical daily  dexAMETHasone  Injectable 6 milliGRAM(s) IV Push daily  dextrose 40% Gel 15 Gram(s) Oral once  dextrose 5%. 1000 milliLiter(s) IV Continuous <Continuous>  dextrose 5%. 1000 milliLiter(s) IV Continuous <Continuous>  dextrose 50% Injectable 25 Gram(s) IV Push once  dextrose 50% Injectable 12.5 Gram(s) IV Push once  dextrose 50% Injectable 25 Gram(s) IV Push once  furosemide   Injectable 60 milliGRAM(s) IV Push daily  glucagon  Injectable 1 milliGRAM(s) IntraMuscular once  heparin   Injectable 5000 Unit(s) SubCutaneous every 8 hours  influenza   Vaccine 0.5 milliLiter(s) IntraMuscular once  insulin lispro (ADMELOG) corrective regimen sliding scale   SubCutaneous three times a day before meals  melatonin 5 milliGRAM(s) Oral at bedtime  metoprolol succinate ER 25 milliGRAM(s) Oral daily  polyethylene glycol 3350 17 Gram(s) Oral every 12 hours  senna 2 Tablet(s) Oral at bedtime  sodium bicarbonate 650 milliGRAM(s) Oral three times a day  sodium zirconium cyclosilicate 10 Gram(s) Oral daily    PRN MEDICATIONS  acetaminophen   Tablet .. 650 milliGRAM(s) Oral every 8 hours PRN  guaifenesin/dextromethorphan  Syrup 5 milliLiter(s) Oral every 6 hours PRN    VITALS:   T(F): 97, Max: 97 (03-26-21 @ 04:50)  HR: 80 (70 - 83)  BP: 134/83 (121/72 - 146/76)  RR: 19 (18 - 19)  SpO2: 95% (90% - 95%)    LABS:                        12.4   6.77  )-----------( 189      ( 26 Mar 2021 04:30 )             35.9     03-26    141  |  108  |  107<HH>  ----------------------------<  126<H>  4.8   |  19  |  1.9<H>    Ca    7.8<L>      26 Mar 2021 04:30    TPro  5.7<L>  /  Alb  3.0<L>  /  TBili  0.7  /  DBili  x   /  AST  70<H>  /  ALT  82<H>  /  AlkPhos  76  03-26 03-25-21 @ 07:01  -  03-26-21 @ 07:00  --------------------------------------------------------  IN: 820 mL / OUT: 2800 mL / NET: -1980 mL    03-26-21 @ 07:01  -  03-26-21 @ 11:14  --------------------------------------------------------  IN: 360 mL / OUT: 875 mL / NET: -515 mL        PHYSICAL EXAM:  GEN: NAD, comfortable  LUNGS: CTAB, no w/r/r  HEART: RRR, s1 and s2 appreciated, no m/r/g  ABD: soft, NT/ND, +BS  EXT: no edema, wrapped in dressing  NEURO: AAOX3

## 2021-03-26 NOTE — PROGRESS NOTE ADULT - ASSESSMENT
Acute severe COVID pneumonia  Acute hypoxemic respiratory failure  Acute on CKD (baseline serum creatinine 1.5 mg/dL), COVID vs. contrast as reason for acute kidney injury  Systolic CHF - usually NYHA Class 2-3 now Class 4    Suggest:    Minimize fluid intake  Continue PO bicarb  Continue Lokelma   Can use IV Lasix to drive urine output but watch blood pressure closely  No indication for dialysis, creatinine improving

## 2021-03-26 NOTE — PROGRESS NOTE ADULT - SUBJECTIVE AND OBJECTIVE BOX
[place] NEPHROLOGY FOLLOW UP NOTE  --------------------------------------------------------------------------------  24 hour events/subjective: Patient examined. Appears comfortable.    PAST HISTORY  --------------------------------------------------------------------------------  No significant changes to PMH, PSH, FHx, SHx, unless otherwise noted    ALLERGIES & MEDICATIONS  --------------------------------------------------------------------------------  Allergies    ACE inhibitors (Angioedema)    Intolerances      Standing Inpatient Medications  aspirin enteric coated 81 milliGRAM(s) Oral daily  atorvastatin 80 milliGRAM(s) Oral at bedtime  chlorhexidine 4% Liquid 1 Application(s) Topical daily  dexAMETHasone  Injectable 6 milliGRAM(s) IV Push daily  dextrose 40% Gel 15 Gram(s) Oral once  dextrose 5%. 1000 milliLiter(s) IV Continuous <Continuous>  dextrose 5%. 1000 milliLiter(s) IV Continuous <Continuous>  dextrose 50% Injectable 25 Gram(s) IV Push once  dextrose 50% Injectable 12.5 Gram(s) IV Push once  dextrose 50% Injectable 25 Gram(s) IV Push once  furosemide   Injectable 60 milliGRAM(s) IV Push daily  glucagon  Injectable 1 milliGRAM(s) IntraMuscular once  heparin   Injectable 5000 Unit(s) SubCutaneous every 8 hours  influenza   Vaccine 0.5 milliLiter(s) IntraMuscular once  insulin lispro (ADMELOG) corrective regimen sliding scale   SubCutaneous three times a day before meals  melatonin 5 milliGRAM(s) Oral at bedtime  metoprolol succinate ER 25 milliGRAM(s) Oral daily  polyethylene glycol 3350 17 Gram(s) Oral every 12 hours  senna 2 Tablet(s) Oral at bedtime  sodium bicarbonate 650 milliGRAM(s) Oral three times a day  sodium zirconium cyclosilicate 10 Gram(s) Oral daily  zolpidem 5 milliGRAM(s) Oral at bedtime    PRN Inpatient Medications  acetaminophen   Tablet .. 650 milliGRAM(s) Oral every 8 hours PRN  guaifenesin/dextromethorphan  Syrup 5 milliLiter(s) Oral every 6 hours PRN      VITALS/PHYSICAL EXAM  --------------------------------------------------------------------------------  T(C): 36.1 (03-26-21 @ 04:50), Max: 36.1 (03-26-21 @ 04:50)  HR: 80 (03-26-21 @ 08:38) (70 - 83)  BP: 134/83 (03-26-21 @ 08:38) (121/72 - 146/76)  RR: 19 (03-26-21 @ 08:38) (18 - 19)  SpO2: 95% (03-26-21 @ 08:13) (90% - 95%)  Wt(kg): --        03-25-21 @ 07:01  -  03-26-21 @ 07:00  --------------------------------------------------------  IN: 820 mL / OUT: 2800 mL / NET: -1980 mL    03-26-21 @ 07:01  -  03-26-21 @ 11:57  --------------------------------------------------------  IN: 360 mL / OUT: 875 mL / NET: -515 mL      Physical Exam:  	Gen: NAD  	Pulm: CTA B/L  	CV: RRR, S1S2  	Abd: +BS, soft, nontender/nondistended  	: No suprapubic tenderness  	LE: Warm, no edema    LABS/STUDIES  --------------------------------------------------------------------------------              12.4   6.77  >-----------<  189      [03-26-21 @ 04:30]              35.9     141  |  108  |  107  ----------------------------<  126      [03-26-21 @ 04:30]  4.8   |  19  |  1.9        Ca     7.8     [03-26-21 @ 04:30]    TPro  5.7  /  Alb  3.0  /  TBili  0.7  /  DBili  x   /  AST  70  /  ALT  82  /  AlkPhos  76  [03-26-21 @ 04:30]    Creatinine Trend:  SCr 1.9 [03-26 @ 04:30]  SCr 2.3 [03-25 @ 06:06]  SCr 2.9 [03-24 @ 08:44]  SCr 3.3 [03-23 @ 10:01]  SCr 3.4 [03-23 @ 00:17]    Urinalysis - [03-23-21 @ 09:35]      Color Yellow / Appearance Clear / SG 1.016 / pH 6.0      Gluc Negative / Ketone Negative  / Bili Negative / Urobili <2 mg/dL       Blood Small / Protein 300 mg/dL / Leuk Est Negative / Nitrite Negative      RBC 3 / WBC 3 / Hyaline 3 / Gran  / Sq Epi  / Non Sq Epi 1 / Bacteria Negative    Urine Creatinine 110      [03-23-21 @ 09:35]  Urine Sodium <20.0      [03-23-21 @ 09:35]  Urine Urea Nitrogen 588      [03-23-21 @ 09:35]  Urine Osmolality 339      [03-23-21 @ 09:35]    Ferritin 1478      [03-19-21 @ 09:46]  HbA1c 6.1      [11-23-19 @ 08:02]  TSH 0.46      [03-20-21 @ 04:30]  Lipid: chol 87, TG 64, HDL 41, LDL --      [03-20-21 @ 04:30]

## 2021-03-26 NOTE — DIETITIAN INITIAL EVALUATION ADULT. - CONTINUE CURRENT NUTRITION CARE PLAN
Nutrition intervention: meals and snacks. Continue DASH/TLC diet order as tolerated. Monitor need for carb consistency./yes

## 2021-03-26 NOTE — PROGRESS NOTE ADULT - SUBJECTIVE AND OBJECTIVE BOX
Patient is a 70y old  Male who presents with a chief complaint of SOB (26 Mar 2021 11:57)    HPI:  71 yo M with PMHx of CKD (Unknown baseline), HFrEF (On Entresto), HTN, HLD, DM (A1c 6.5 per patient), LGIB secondary to Diverticulitis s/p colon resection (at [facility] about 6.5 years ago) and CAD presenting today with SOB. Patient states he has had progressive SOB x 6 weeks however it has worsened in the last 2 weeks. Patient states he developed chills, and productive cough x 2 days. Endorses diarrhea and diffuse abdominal discomfort x 6 weeks as well. Denies chest pain though    Vital Signs Last 24 Hrs  T(F): 99.5 (19 Mar 2021 12:07), Max: 102.3 (19 Mar 2021 08:53)  HR: 71 (19 Mar 2021 12:07) (71 - 93)  BP: 150/92 (19 Mar 2021 12:07) (136/84 - 150/92)  RR: 24 (19 Mar 2021 13:18) (20 - 24)  SpO2: 100% (19 Mar 2021 13:18) (92% - 100%)    In ED patient was received septic on admission (HR 93, Temp 102.3, RR 20, WBC <2k, COVID +) lactate 1.1  Was hypoxic to 84% with minimal exertion in the stretcher. Patient on high-lina at this time. Patient initially saturating at 96% on 3L NC; however later desatted requiring over 6L NC. Patient now on high-lina. No signs of right heart strain on POCUS.  CXR showed b/l opcities. Was unable to tolerate CTA ruled out PE.  Labs are significant for hyperkalemia 6.1, with tall T waves on my read, Calcium gluconate is ordered STAT and insulin push was already given by ER.  Dimer 562, pro-BNP 2718, Trop 0.18, Cr 2.9   (19 Mar 2021 14:40)    PAST MEDICAL & SURGICAL HISTORY:  Diabetes  Dyslipidemia  Hypertension  Heart failure  Diverticulitis  S/P partial resection of colon    Vital Signs Last 24 Hrs  T(C): 36.1 (26 Mar 2021 04:50), Max: 36.1 (26 Mar 2021 04:50)  T(F): 97 (26 Mar 2021 04:50), Max: 97 (26 Mar 2021 04:50)  HR: 80 (26 Mar 2021 08:38) (70 - 83)  BP: 134/83 (26 Mar 2021 08:38) (121/72 - 146/76)  BP(mean): --  RR: 19 (26 Mar 2021 08:38) (18 - 19)  SpO2: 95% (26 Mar 2021 08:13) (90% - 95%)             PE:  GEN-NAD, AAOx3, oob in chair on HF- still sob  PULM- decreased air entry, bilateral crackles  CVS- +s1/s2 RRR  GI- soft NTobese ND +bs, no rebound, no guarding  EXT- 1+_edema, bilateral dressing                12.4   6.77  )-----------( 189      ( 26 Mar 2021 04:30 )             35.9     03-26    141  |  108  |  107<HH>  ----------------------------<  126<H>  4.8   |  19  |  1.9<H>    Ca    7.8<L>      26 Mar 2021 04:30    TPro  5.7<L>  /  Alb  3.0<L>  /  TBili  0.7  /  DBili  x   /  AST  70<H>  /  ALT  82<H>  /  AlkPhos  76  03-26              MEDICATIONS  (STANDING):  aspirin enteric coated 81 milliGRAM(s) Oral daily  atorvastatin 80 milliGRAM(s) Oral at bedtime  chlorhexidine 4% Liquid 1 Application(s) Topical daily  dexAMETHasone  Injectable 6 milliGRAM(s) IV Push daily  dextrose 40% Gel 15 Gram(s) Oral once  dextrose 5%. 1000 milliLiter(s) (50 mL/Hr) IV Continuous <Continuous>  dextrose 5%. 1000 milliLiter(s) (100 mL/Hr) IV Continuous <Continuous>  dextrose 50% Injectable 25 Gram(s) IV Push once  dextrose 50% Injectable 12.5 Gram(s) IV Push once  dextrose 50% Injectable 25 Gram(s) IV Push once  furosemide   Injectable 60 milliGRAM(s) IV Push daily  glucagon  Injectable 1 milliGRAM(s) IntraMuscular once  heparin   Injectable 5000 Unit(s) SubCutaneous every 8 hours  influenza   Vaccine 0.5 milliLiter(s) IntraMuscular once  insulin lispro (ADMELOG) corrective regimen sliding scale   SubCutaneous three times a day before meals  melatonin 5 milliGRAM(s) Oral at bedtime  metoprolol succinate ER 25 milliGRAM(s) Oral daily  polyethylene glycol 3350 17 Gram(s) Oral every 12 hours  senna 2 Tablet(s) Oral at bedtime  sodium bicarbonate 650 milliGRAM(s) Oral three times a day  sodium zirconium cyclosilicate 10 Gram(s) Oral daily  zolpidem 5 milliGRAM(s) Oral at bedtime

## 2021-03-26 NOTE — DIETITIAN INITIAL EVALUATION ADULT. - ORAL INTAKE PTA/DIET HISTORY
Unable to obtain nutr hx d/t strict COVIS precautions. NKFA per EMR. No weight loss noted when compare weights to previous admit.

## 2021-03-26 NOTE — PROGRESS NOTE ADULT - ASSESSMENT
a/p  #Acute hypoxic respiratory failure 2/2 Covid-19 viral pneumonia  -continue isolation precuations  -remains on HF (50L/50%)---titrate down o2 needs as tolerated  -duplex negative  -repeat DDimer slightly higer---check venous duplex  -finish course azithro/ceftriaxone (elevated procal---suspect 2/2 bacterial pneumonia)  -RDV stopped 2/2 SAGE  -continue dexamethasone 6 mg iv daily    #SAGE 2/2 ATN  -cont to monitor bmp closely  -nephrology following  -cr improving (today 1.9)  -lasix iv 60 mg daily--monitor i/o and urine output (CXR daily showing improvement)  -cont lokelma and bicarb and monitor electrolytes    #PVD  -vascular following---bilateral dressing changes yesterday  -duplex negative for dvt on admission  -but repeat ddimer higher---check duplex to r/o dvt (continue hep sq for dvt ppx    DVT/GI ppx  guarded prognosis     #Progress Note Handoff  Pending (specify): remains on NCHF  Family discussion: d/w patient himself   Disposition: Home_ x (when medically stable)

## 2021-03-26 NOTE — PROGRESS NOTE ADULT - ASSESSMENT
70 year old male patient with CKD, HFrEF, HTN, DM, CAD, and DM who presented on 03/19 with few days of shortness of breath, found to have COVID pneumonia, admitted for hypoxic RF secondary to COVID pneumonia. Currently hemodynamically stable.    Acute Hypoxemic Respiratory Failure Secondary to COVID Pneumonia  * SARS-COV2: positive 03/19  * COVID Antibody positive, no indications for plasma  * CT angio chest AP IC PE Protocol (03/19) no PE, bilateral groundglass opacities, enlarged thoracic lymph nodes  - Infectious Disease and Pulmonary team are on board  - blood cx negative  - s/p 1 dose of RDV, stopped secondary to low GFR  - CRP 30, no toci given  - c/w dexa  - procal 1.31 s/p course of azithro and ceftriaxone  - Anticoagulation Heparin 5000 Q8h. Duplex venous LE 03/19 no DVT  - patient was on 6 L NC on 3/22, increase in oxygen demands and started on 3/25 on HFNC 50 L 60% Fio2  - Repeated CXR showed improvement of his opacities  - Repeat US duplex venous  - c/w lasix 60 mg daily    SAGE on CKD  * Baseline Cr 1.5, peaked at 3.5 now downtrending  * Likely ATN non oliguric   - Placed a rivera catheter 03/21  - increase in o2 demands, will start lasix 60 mg iv daily  - Monitor K and HCO3 QD. started on lokelma and bicarb  - nephro following  - CT a/p negative for hydro  - no need for dialysis    HTN  * Home meds: Entresto 97, 103mg BID, Lasix 20mg 2x/week, Toprol 25mg QD  - hold entresto given SAGE  - Monitor BP   - Continue Toprol 25mg QD    DM II  * Hba1c 6.9 03/20  * No home meds  - Monitor POCT premeals and at bedtime  - BS<180, no need for insulin  - Monitor BS    Dyslipidemia  CAD  * Lipid profile 03/20 chol 87, TG 64, HDL 41, LDL 38  * Home meds: Aspirin 81mg QD, Atorvastatin 80mg QD, Entresto 90, 103mg BID, Ezetimibe 10mg QD, Toprol 25mg QD  * ECG 03/19 NSR, L axis deviation, LAFB  * Troponin not uptrending, likely secondary to kidney injury  - Continue Aspirin 81mg QD  - Continue Atorvastatin 80mg QD  - Continue Toprol 25mg QD       HFrEF  * Last TTE not present in chart  * Home meds: Lasix 20mg 2x/week, Entresto 97,103mg BID, Toprol 25mg QD  - c/w lasix iv   * Pro-BNP (03/19) 7888  - Monitor accurate intake/output  - Monitor daily weight     Others  - DVT Prophylaxis Heparin 5000 Q8h. Duplex venous LE 03/19 no DVT  - GI Prophylaxis: not on Pantoprazole 40mg PO QD  - Diet: DASH/TLC  - Code Status: Full 70 year old male patient with CKD, HFrEF, HTN, DM, CAD, and DM who presented on 03/19 with few days of shortness of breath, found to have COVID pneumonia, admitted for hypoxic RF secondary to COVID pneumonia. Currently hemodynamically stable.    Acute Hypoxemic Respiratory Failure Secondary to COVID Pneumonia  * SARS-COV2: positive 03/19  * COVID Antibody positive, no indications for plasma  * CT angio chest AP IC PE Protocol (03/19) no PE, bilateral groundglass opacities, enlarged thoracic lymph nodes  - Infectious Disease and Pulmonary team are on board  - blood cx negative  - s/p 1 dose of RDV, stopped secondary to low GFR  - CRP 30, no toci given  - c/w dexa  - procal 1.31 s/p course of azithro and ceftriaxone  - Anticoagulation Heparin 5000 Q8h. Duplex venous LE 03/19 no DVT  - patient was on 6 L NC on 3/22, increase in oxygen demands and started on 3/25 on HFNC 50 L 60% Fio2  - Repeated CXR showed improvement of his opacities  - Repeat US duplex venous  - c/w lasix 60 mg daily    SAGE on CKD  * Baseline Cr 1.5, peaked at 3.5 now downtrending  * Likely ATN non oliguric   - Placed a rivera catheter 03/21  - increase in o2 demands, will start lasix 60 mg iv daily  - Monitor K and HCO3 QD. started on lokelma and bicarb   - nephro following  - CT a/p negative for hydro  - no need for dialysis    HTN  * Home meds: Entresto 97, 103mg BID, Lasix 20mg 2x/week, Toprol 25mg QD  - hold entresto given SAGE  - Monitor BP   - Continue Toprol 25mg QD    DM II  * Hba1c 6.9 03/20  * No home meds  - Monitor POCT premeals and at bedtime  - BS<180, no need for insulin  - Monitor BS    Dyslipidemia  CAD  * Lipid profile 03/20 chol 87, TG 64, HDL 41, LDL 38  * Home meds: Aspirin 81mg QD, Atorvastatin 80mg QD, Entresto 90, 103mg BID, Ezetimibe 10mg QD, Toprol 25mg QD  * ECG 03/19 NSR, L axis deviation, LAFB  * Troponin not uptrending, likely secondary to kidney injury  - Continue Aspirin 81mg QD  - Continue Atorvastatin 80mg QD  - Continue Toprol 25mg QD       HFrEF  * Last TTE not present in chart  * Home meds: Lasix 20mg 2x/week, Entresto 97,103mg BID, Toprol 25mg QD  - c/w lasix iv   * Pro-BNP (03/19) 1718  - Monitor accurate intake/output  - Monitor daily weight     Others  - DVT Prophylaxis Heparin 5000 Q8h. Duplex venous LE 03/19 no DVT  - GI Prophylaxis: not on Pantoprazole 40mg PO QD  - Diet: DASH/TLC  - Code Status: Full

## 2021-03-26 NOTE — DIETITIAN INITIAL EVALUATION ADULT. - RD TO REMAIN AVAILABLE
RD to monitor diet order, energy intake, body composition, NFPF, electrolyte/renal profile, glucose profile. Goal: In 7 days pt. to continue to consume 100% PO at meals/yes

## 2021-03-26 NOTE — DIETITIAN INITIAL EVALUATION ADULT. - OTHER INFO
P/w: SOB. Acute Hypoxemic Respiratory Failure Secondary to COVID Pneumonia. - Infectious Disease and Pulmonary team are on board. SAGE on CKD: Nephro following, no RRT. HTN: medicated. DM2: monitoring. CHF: home meds.

## 2021-03-27 NOTE — PROGRESS NOTE ADULT - ASSESSMENT
Acute severe COVID pneumonia  Acute hypoxemic respiratory failure  Acute on CKD (baseline serum creatinine 1.5 mg/dL), COVID vs. contrast as reason for acute kidney injury  Systolic CHF - usually NYHA Class 2-3 now Class 4    Suggest:    Minimize fluid intake  DC PO bicarb  Continue Lokelma   Can use IV Lasix to drive urine output but watch blood pressure closely  No indication for dialysis, creatinine improving

## 2021-03-27 NOTE — PROGRESS NOTE ADULT - ASSESSMENT
70 year old male patient with CKD, HFrEF, HTN, DM, CAD, and DM who presented on 03/19 with few days of shortness of breath, found to have COVID pneumonia, admitted for hypoxic RF secondary to COVID pneumonia. Currently hemodynamically stable.    Acute Hypoxemic Respiratory Failure Secondary to COVID Pneumonia  * SARS-COV2: positive 03/19  * COVID Antibody positive, no indications for plasma  * CT angio chest AP IC PE Protocol (03/19) no PE, bilateral groundglass opacities, enlarged thoracic lymph nodes  - Infectious Disease and Pulmonary team are on board  - blood cx negative  - s/p 1 dose of RDV, stopped secondary to low GFR  - CRP 30, no toci given  - c/w dexa  - procal 1.31 s/p course of azithro and ceftriaxone  - Anticoagulation Heparin 5000 Q8h. Duplex venous LE 03/19 and 3/26 no DVT  - patient was on 6 L NC on 3/22, increase in oxygen demands and started on 3/25 on HFNC 50 L 80% Fio2, component of volume overload secondary to ATN  - Repeated CXR showed improvement of his opacities after he received diuretics  - c/w lasix 40 mg daily    SAGE on CKD  * Baseline Cr 1.5, peaked at 3.5 now downtrending, 1.6 today  * Likely ATN non oliguric   - Placed a rivera catheter 03/21  - increase in o2 demands, c/w lasix 40 mg iv daily  - Monitor K and HCO3 QD. started on lokelma  - d/c bicarb  - nephro following  - CT a/p negative for hydro  - no need for dialysis    HTN  * Home meds: Entresto 97, 103mg BID, Lasix 20mg 2x/week, Toprol 25mg QD  - hold entresto given SAGE  - Monitor BP   - Continue Toprol 25mg QD    DM II  * Hba1c 6.9 03/20  * No home meds  - Monitor POCT premeals and at bedtime  - BS<180, no need for insulin  - Monitor BS    Dyslipidemia  CAD  * Lipid profile 03/20 chol 87, TG 64, HDL 41, LDL 38  * Home meds: Aspirin 81mg QD, Atorvastatin 80mg QD, Entresto 90, 103mg BID, Ezetimibe 10mg QD, Toprol 25mg QD  * ECG 03/19 NSR, L axis deviation, LAFB  * Troponin not uptrending, likely secondary to kidney injury  - Continue Aspirin 81mg QD  - Continue Atorvastatin 80mg QD  - Continue Toprol 25mg QD       HFrEF  * Last TTE not present in chart  * Home meds: Lasix 20mg 2x/week, Entresto 97,103mg BID, Toprol 25mg QD  - c/w lasix iv   * Pro-BNP (03/19) 8488  - Monitor accurate intake/output  - Monitor daily weight     Others  - DVT Prophylaxis Heparin 5000 Q8h. Duplex venous LE 03/19 and 3/26 no DVT  - GI Prophylaxis: not on Pantoprazole 40mg PO QD  - Diet: DASH/TLC  - Code Status: Full

## 2021-03-27 NOTE — PROGRESS NOTE ADULT - SUBJECTIVE AND OBJECTIVE BOX
SUBJECTIVE:    Patient is a 70y old Male who presents with a chief complaint of SOB (26 Mar 2021 13:38)    Overnight Events: Patient is stable, no acute events overnight.  VS are stable, no major complaints.  Still on HFNC 50 L 80 Fio2.    PAST MEDICAL & SURGICAL HISTORY  Diabetes    Dyslipidemia    Hypertension    Heart failure    Diverticulitis    S/P partial resection of colon      SOCIAL HISTORY:  Negative for smoking/alcohol/drug use.     ALLERGIES:  ACE inhibitors (Angioedema)    MEDICATIONS:  STANDING MEDICATIONS  aspirin enteric coated 81 milliGRAM(s) Oral daily  atorvastatin 80 milliGRAM(s) Oral at bedtime  chlorhexidine 4% Liquid 1 Application(s) Topical daily  dexAMETHasone  Injectable 6 milliGRAM(s) IV Push daily  dextrose 40% Gel 15 Gram(s) Oral once  dextrose 5%. 1000 milliLiter(s) IV Continuous <Continuous>  dextrose 5%. 1000 milliLiter(s) IV Continuous <Continuous>  dextrose 50% Injectable 25 Gram(s) IV Push once  dextrose 50% Injectable 12.5 Gram(s) IV Push once  dextrose 50% Injectable 25 Gram(s) IV Push once  furosemide   Injectable 40 milliGRAM(s) IV Push daily  glucagon  Injectable 1 milliGRAM(s) IntraMuscular once  heparin   Injectable 5000 Unit(s) SubCutaneous every 8 hours  influenza   Vaccine 0.5 milliLiter(s) IntraMuscular once  insulin lispro (ADMELOG) corrective regimen sliding scale   SubCutaneous three times a day before meals  melatonin 5 milliGRAM(s) Oral at bedtime  metoprolol succinate ER 25 milliGRAM(s) Oral daily  polyethylene glycol 3350 17 Gram(s) Oral every 12 hours  senna 2 Tablet(s) Oral at bedtime  sodium bicarbonate 650 milliGRAM(s) Oral three times a day  sodium zirconium cyclosilicate 10 Gram(s) Oral daily  zolpidem 5 milliGRAM(s) Oral at bedtime    PRN MEDICATIONS  acetaminophen   Tablet .. 650 milliGRAM(s) Oral every 8 hours PRN  guaifenesin/dextromethorphan  Syrup 5 milliLiter(s) Oral every 6 hours PRN    VITALS:   T(F): 96, Max: 98.2 (03-26-21 @ 12:30)  HR: 75 (54 - 75)  BP: 117/76 (116/69 - 130/80)  RR: 22 (18 - 22)  SpO2: 91% (91% - 96%)    LABS:                        12.8   7.81  )-----------( 202      ( 27 Mar 2021 08:36 )             37.6     03-27    143  |  107  |  94<HH>  ----------------------------<  117<H>  5.0   |  24  |  1.6<H>    Ca    8.8      27 Mar 2021 08:36    TPro  6.2  /  Alb  3.4<L>  /  TBili  1.0  /  DBili  x   /  AST  60<H>  /  ALT  93<H>  /  AlkPhos  85  03-27 03-26-21 @ 07:01  -  03-27-21 @ 07:00  --------------------------------------------------------  IN: 1380 mL / OUT: 2835 mL / NET: -1455 mL    03-27-21 @ 07:01  -  03-27-21 @ 10:53  --------------------------------------------------------  IN: 0 mL / OUT: 825 mL / NET: -825 mL        PHYSICAL EXAM:  GEN: NAD, comfortable  LUNGS: CTAB, b*l rales   HEART: RRR, s1 and s2 appreciated, no m/r/g  ABD: soft, NT/ND, +BS  EXT: b/l AURA  NEURO: AAOX3

## 2021-03-27 NOTE — PROGRESS NOTE ADULT - SUBJECTIVE AND OBJECTIVE BOX
[place] NEPHR[facility] FOLLOW UP NOTE  --------------------------------------------------------------------------------  24 hour events/subjective: Patient examined. Appears comfortable.    PAST HISTORY  --------------------------------------------------------------------------------  No significant changes to PMH, PSH, FHx, SHx, unless otherwise noted    ALLERGIES & MEDICATIONS  --------------------------------------------------------------------------------  Allergies    ACE inhibitors (Angioedema)    Standing Inpatient Medications  aspirin enteric coated 81 milliGRAM(s) Oral daily  atorvastatin 80 milliGRAM(s) Oral at bedtime  chlorhexidine 4% Liquid 1 Application(s) Topical daily  dexAMETHasone  Injectable 6 milliGRAM(s) IV Push daily  dextrose 40% Gel 15 Gram(s) Oral once  dextrose 5%. 1000 milliLiter(s) IV Continuous <Continuous>  dextrose 5%. 1000 milliLiter(s) IV Continuous <Continuous>  dextrose 50% Injectable 25 Gram(s) IV Push once  dextrose 50% Injectable 12.5 Gram(s) IV Push once  dextrose 50% Injectable 25 Gram(s) IV Push once  furosemide   Injectable 40 milliGRAM(s) IV Push daily  glucagon  Injectable 1 milliGRAM(s) IntraMuscular once  heparin   Injectable 5000 Unit(s) SubCutaneous every 8 hours  influenza   Vaccine 0.5 milliLiter(s) IntraMuscular once  insulin lispro (ADMELOG) corrective regimen sliding scale   SubCutaneous three times a day before meals  melatonin 5 milliGRAM(s) Oral at bedtime  metoprolol succinate ER 25 milliGRAM(s) Oral daily  polyethylene glycol 3350 17 Gram(s) Oral every 12 hours  senna 2 Tablet(s) Oral at bedtime  sodium zirconium cyclosilicate 10 Gram(s) Oral daily  zolpidem 5 milliGRAM(s) Oral at bedtime    PRN Inpatient Medications  acetaminophen   Tablet .. 650 milliGRAM(s) Oral every 8 hours PRN  guaifenesin/dextromethorphan  Syrup 5 milliLiter(s) Oral every 6 hours PRN    VITALS/PHYSICAL EXAM  --------------------------------------------------------------------------------  T(C): 35.6 (03-27-21 @ 08:00), Max: 35.8 (03-27-21 @ 05:03)  HR: 75 (03-27-21 @ 08:00) (54 - 75)  BP: 117/76 (03-27-21 @ 08:00) (116/69 - 130/80)  RR: 22 (03-27-21 @ 08:00) (18 - 22)  SpO2: 91% (03-27-21 @ 08:00) (91% - 96%)    03-26-21 @ 07:01  -  03-27-21 @ 07:00  --------------------------------------------------------  IN: 1380 mL / OUT: 2835 mL / NET: -1455 mL    03-27-21 @ 07:01  -  03-27-21 @ 12:57  --------------------------------------------------------  IN: 0 mL / OUT: 825 mL / NET: -825 mL    Physical Exam:  	Gen: NAD  	Pulm: CTA B/L  	CV: RRR, S1S2  	Abd: +BS, soft, nontender/nondistended  	: No suprapubic tenderness  	LE: Warm, no edema    LABS/STUDIES  --------------------------------------------------------------------------------              12.8   7.81  >-----------<  202      [03-27-21 @ 08:36]              37.6     143  |  107  |  94  ----------------------------<  117      [03-27-21 @ 08:36]  5.0   |  24  |  1.6        Ca     8.8     [03-27-21 @ 08:36]    TPro  6.2  /  Alb  3.4  /  TBili  1.0  /  DBili  x   /  AST  60  /  ALT  93  /  AlkPhos  85  [03-27-21 @ 08:36]    Creatinine Trend:  SCr 1.6 [03-27 @ 08:36]  SCr 1.9 [03-26 @ 04:30]  SCr 2.3 [03-25 @ 06:06]  SCr 2.9 [03-24 @ 08:44]  SCr 3.3 [03-23 @ 10:01]    Urinalysis - [03-23-21 @ 09:35]      Color Yellow / Appearance Clear / SG 1.016 / pH 6.0      Gluc Negative / Ketone Negative  / Bili Negative / Urobili <2 mg/dL       Blood Small / Protein 300 mg/dL / Leuk Est Negative / Nitrite Negative      RBC 3 / WBC 3 / Hyaline 3 / Gran  / Sq Epi  / Non Sq Epi 1 / Bacteria Negative    Urine Creatinine 110      [03-23-21 @ 09:35]  Urine Sodium <20.0      [03-23-21 @ 09:35]  Urine Urea Nitrogen 588      [03-23-21 @ 09:35]  Urine Osmolality 339      [03-23-21 @ 09:35]    Ferritin 1478      [03-19-21 @ 09:46]  HbA1c 6.1      [11-23-19 @ 08:02]  TSH 0.46      [03-20-21 @ 04:30]  Lipid: chol 87, TG 64, HDL 41, LDL --      [03-20-21 @ 04:30]

## 2021-03-27 NOTE — PROGRESS NOTE ADULT - SUBJECTIVE AND OBJECTIVE BOX
Patient is a 70y old  Male who presents with a chief complaint of SOB (26 Mar 2021 11:57)    HPI:  71 yo M with PMHx of CKD (Unknown baseline), HFrEF (On Entresto), HTN, HLD, DM (A1c 6.5 per patient), LGIB secondary to Diverticulitis s/p colon resection (at [facility] about 6.5 years ago) and CAD presenting today with SOB. Patient states he has had progressive SOB x 6 weeks however it has worsened in the last 2 weeks. Patient states he developed chills, and productive cough x 2 days. Endorses diarrhea and diffuse abdominal discomfort x 6 weeks as well. Denies chest pain though    Vital Signs Last 24 Hrs  T(F): 99.5 (19 Mar 2021 12:07), Max: 102.3 (19 Mar 2021 08:53)  HR: 71 (19 Mar 2021 12:07) (71 - 93)  BP: 150/92 (19 Mar 2021 12:07) (136/84 - 150/92)  RR: 24 (19 Mar 2021 13:18) (20 - 24)  SpO2: 100% (19 Mar 2021 13:18) (92% - 100%)    In ED patient was received septic on admission (HR 93, Temp 102.3, RR 20, WBC <2k, COVID +) lactate 1.1  Was hypoxic to 84% with minimal exertion in the stretcher. Patient on high-lina at this time. Patient initially saturating at 96% on 3L NC; however later desatted requiring over 6L NC. Patient now on high-lina. No signs of right heart strain on POCUS.  CXR showed b/l opcities. Was unable to tolerate CTA ruled out PE.  Labs are significant for hyperkalemia 6.1, with tall T waves on my read, Calcium gluconate is ordered STAT and insulin push was already given by ER.  Dimer 562, pro-BNP 2718, Trop 0.18, Cr 2.9   (19 Mar 2021 14:40)    PAST MEDICAL & SURGICAL HISTORY:  Diabetes  Dyslipidemia  Hypertension  Heart failure  Diverticulitis  S/P partial resection of colon    patient seen and examined independently on morning rounds, chart reviewed and discussed with the medicine resident and on interdisciplinary rounds.    was able to sleep better yesterday after ambien- still intermittently sob and hypoxic- feeling better  PE:  GEN-NAD, AAOx3, oob in chair on HF- still sob  PULM- decreased air entry, bilateral crackles  CVS- +s1/s2 RRR  GI- soft NTobese ND +bs, no rebound, no guarding  EXT- 1+_edema, bilateral dressing LE        Labs:                        12.8   7.81  )-----------( 202      ( 27 Mar 2021 08:36 )             37.6     CBC Full  -  ( 27 Mar 2021 08:36 )  WBC Count : 7.81 K/uL  RBC Count : 4.78 M/uL  Hemoglobin : 12.8 g/dL  Hematocrit : 37.6 %  Platelet Count - Automated : 202 K/uL  Mean Cell Volume : 78.7 fL  Mean Cell Hemoglobin : 26.8 pg  Mean Cell Hemoglobin Concentration : 34.0 g/dL  Auto Neutrophil # : 5.99 K/uL  Auto Lymphocyte # : 0.78 K/uL  Auto Monocyte # : 0.49 K/uL  Auto Eosinophil # : 0.00 K/uL  Auto Basophil # : 0.05 K/uL  Auto Neutrophil % : 76.7 %  Auto Lymphocyte % : 10.0 %  Auto Monocyte % : 6.3 %  Auto Eosinophil % : 0.0 %  Auto Basophil % : 0.6 %      03-27    143  |  107  |  94<HH>  ----------------------------<  117<H>  5.0   |  24  |  1.6<H>    Ca    8.8      27 Mar 2021 08:36    TPro  6.2  /  Alb  3.4<L>  /  TBili  1.0  /  DBili  x   /  AST  60<H>  /  ALT  93<H>  /  AlkPhos  85  03-27      Microbiology:      Vital Signs Last 24 Hrs  T(C): 35.6 (27 Mar 2021 14:40), Max: 35.8 (27 Mar 2021 05:03)  T(F): 96 (27 Mar 2021 14:40), Max: 96.5 (27 Mar 2021 05:03)  HR: 76 (27 Mar 2021 14:40) (54 - 78)  BP: 101/74 (27 Mar 2021 14:40) (101/74 - 130/80)  BP(mean): --  RR: 20 (27 Mar 2021 13:08) (18 - 22)  SpO2: 95% (27 Mar 2021 13:08) (91% - 96%)    I&O's Summary    26 Mar 2021 07:01  -  27 Mar 2021 07:00  --------------------------------------------------------  IN: 1380 mL / OUT: 2835 mL / NET: -1455 mL    27 Mar 2021 07:01  -  27 Mar 2021 14:50  --------------------------------------------------------  IN: 0 mL / OUT: 1275 mL / NET: -1275 mL        MEDICATIONS  (STANDING):  aspirin enteric coated 81 milliGRAM(s) Oral daily  atorvastatin 80 milliGRAM(s) Oral at bedtime  chlorhexidine 4% Liquid 1 Application(s) Topical daily  dexAMETHasone  Injectable 6 milliGRAM(s) IV Push daily  dextrose 40% Gel 15 Gram(s) Oral once  dextrose 5%. 1000 milliLiter(s) (50 mL/Hr) IV Continuous <Continuous>  dextrose 5%. 1000 milliLiter(s) (100 mL/Hr) IV Continuous <Continuous>  dextrose 50% Injectable 25 Gram(s) IV Push once  dextrose 50% Injectable 12.5 Gram(s) IV Push once  dextrose 50% Injectable 25 Gram(s) IV Push once  furosemide   Injectable 60 milliGRAM(s) IV Push daily  glucagon  Injectable 1 milliGRAM(s) IntraMuscular once  heparin   Injectable 5000 Unit(s) SubCutaneous every 8 hours  influenza   Vaccine 0.5 milliLiter(s) IntraMuscular once  insulin lispro (ADMELOG) corrective regimen sliding scale   SubCutaneous three times a day before meals  melatonin 5 milliGRAM(s) Oral at bedtime  metoprolol succinate ER 25 milliGRAM(s) Oral daily  polyethylene glycol 3350 17 Gram(s) Oral every 12 hours  senna 2 Tablet(s) Oral at bedtime  sodium bicarbonate 650 milliGRAM(s) Oral three times a day  sodium zirconium cyclosilicate 10 Gram(s) Oral daily  zolpidem 5 milliGRAM(s) Oral at bedtime

## 2021-03-28 NOTE — PROGRESS NOTE ADULT - ASSESSMENT
a/p:    #Acute hypoxic respiratory failure 2/2 Covid-19 viral pneumonia  -continue isolation precuations  -remains on HF (50L/80%)---titrate down o2 needs as tolerated however patient with slow progress or improvement  -repeat DDimer higher but duplex repeat negative for dvt  -finished course azithro/ceftriaxone (elevated procal---suspect 2/2 bacterial pneumonia)  -RDV stopped 2/2 SAGE  -continue dexamethasone 6 mg iv daily  -covid antibody + (not candidate for convalescent plasma)  -encourage Incentive spirometry (patient is using every hour)    #SAGE 2/2 ATN/contrast dye  -cont to monitor bmp closely  -nephrology following  -cr improving (today 1.6)  -lasix iv 40 mg iv daily and monitor monitor i/o and urine output as well as bp closely (CXR daily showing improvement)  -cont lokelma and bicarb and monitor electrolytes    #PVD  -vascular following---bilateral leg dressing change  -duplex negative for dvt   -continue hep sq for dvt ppx    DVT/GI ppx  guarded prognosis     #Progress Note Handoff  Pending (specify): remains on NCHF  Family discussion: d/w patient himself (he says he speaks to family members on phone daily)  Disposition: Home_ x (when medically stable--remains acute and on High flow oxygen)

## 2021-03-28 NOTE — PROGRESS NOTE ADULT - ASSESSMENT
70 year old male patient with CKD, HFrEF, HTN, DM, CAD, and DM who presented on 03/19 with few days of shortness of breath, found to have COVID pneumonia, admitted for hypoxic RF secondary to COVID pneumonia. Currently hemodynamically stable.    Acute Hypoxemic Respiratory Failure Secondary to COVID Pneumonia  * SARS-COV2: positive 03/19  * COVID Antibody positive, no indications for plasma  * CT angio chest AP IC PE Protocol (03/19) no PE, bilateral groundglass opacities, enlarged thoracic lymph nodes  - Infectious Disease and Pulmonary team are on board  - blood cx negative  - s/p 1 dose of RDV, stopped secondary to low GFR  - CRP 30, no toci given  - c/w dexa  - procal 1.31 s/p course of azithro and ceftriaxone  - Anticoagulation Heparin 5000 Q8h. Duplex venous LE 03/19 and 3/26 no DVT  - patient was on 6 L NC on 3/22, increase in oxygen demands and started on 3/25 on HFNC 50 L 80% Fio2, component of volume overload secondary to ATN  - Chest Xray in the am showed improvement in the vascular congestion bilaterally   - c/w lasix 40 mg daily    SAGE on CKD  * Baseline Cr 1.5, peaked at 3.5 now downtrending, 1.5 today  * Likely ATN non oliguric   - Placed a rivera catheter 03/21  - increase in o2 demands, c/w lasix 40 mg iv daily  - Monitor K and HCO3 QD. started on lokelma  - d/c bicarb  - nephro following  - CT a/p negative for hydro  - no need for dialysis    HTN  * Home meds: Entresto 97, 103mg BID, Lasix 20mg 2x/week, Toprol 25mg QD  - hold entresto given SAGE  - Monitor BP   - Continue Toprol 25mg QD    DM II  * Hba1c 6.9 03/20  * No home meds  - Monitor POCT premeals and at bedtime  - BS<180, no need for insulin  - Monitor BS    Dyslipidemia  CAD  * Lipid profile 03/20 chol 87, TG 64, HDL 41, LDL 38  * Home meds: Aspirin 81mg QD, Atorvastatin 80mg QD, Entresto 90, 103mg BID, Ezetimibe 10mg QD, Toprol 25mg QD  * ECG 03/19 NSR, L axis deviation, LAFB  * Troponin not uptrending, likely secondary to kidney injury  - Continue Aspirin 81mg QD  - Continue Atorvastatin 80mg QD  - Continue Toprol 25mg QD       HFrEF  * Last TTE not present in chart  * Home meds: Lasix 20mg 2x/week, Entresto 97,103mg BID, Toprol 25mg QD  - c/w lasix iv   * Pro-BNP (03/19) 5318  - Monitor accurate intake/output  - Monitor daily weight     Others  - DVT Prophylaxis Heparin 5000 Q8h. Duplex venous LE 03/19 and 3/26 no DVT  - GI Prophylaxis: not on Pantoprazole 40mg PO QD  - Diet: DASH/TLC  - Code Status: Full

## 2021-03-28 NOTE — PROGRESS NOTE ADULT - SUBJECTIVE AND OBJECTIVE BOX
24H events:    Patient is a 70y old Male who presents with a chief complaint of SOB (27 Mar 2021 14:44)    Primary diagnosis of Acute hypoxemic respiratory failure due to COVID-19       Today is hospital day 9d.     PAST MEDICAL & SURGICAL HISTORY  Diabetes    Dyslipidemia    Hypertension    Heart failure    Diverticulitis    S/P partial resection of colon      SOCIAL HISTORY:  Negative for smoking/alcohol/drug use.     ALLERGIES:  ACE inhibitors (Angioedema)    MEDICATIONS:  STANDING MEDICATIONS  aspirin enteric coated 81 milliGRAM(s) Oral daily  atorvastatin 80 milliGRAM(s) Oral at bedtime  chlorhexidine 4% Liquid 1 Application(s) Topical daily  dexAMETHasone  Injectable 6 milliGRAM(s) IV Push daily  dextrose 40% Gel 15 Gram(s) Oral once  dextrose 5%. 1000 milliLiter(s) IV Continuous <Continuous>  dextrose 5%. 1000 milliLiter(s) IV Continuous <Continuous>  dextrose 50% Injectable 25 Gram(s) IV Push once  dextrose 50% Injectable 12.5 Gram(s) IV Push once  dextrose 50% Injectable 25 Gram(s) IV Push once  furosemide   Injectable 40 milliGRAM(s) IV Push daily  glucagon  Injectable 1 milliGRAM(s) IntraMuscular once  heparin   Injectable 5000 Unit(s) SubCutaneous every 8 hours  influenza   Vaccine 0.5 milliLiter(s) IntraMuscular once  insulin lispro (ADMELOG) corrective regimen sliding scale   SubCutaneous three times a day before meals  melatonin 5 milliGRAM(s) Oral at bedtime  metoprolol succinate ER 25 milliGRAM(s) Oral daily  polyethylene glycol 3350 17 Gram(s) Oral every 12 hours  senna 2 Tablet(s) Oral at bedtime  sodium zirconium cyclosilicate 10 Gram(s) Oral daily  zolpidem 5 milliGRAM(s) Oral at bedtime    PRN MEDICATIONS  acetaminophen   Tablet .. 650 milliGRAM(s) Oral every 8 hours PRN  guaifenesin/dextromethorphan  Syrup 5 milliLiter(s) Oral every 6 hours PRN    VITALS:   T(F): 97.1  HR: 61  BP: 113/57  RR: 20  SpO2: 94%    LABS:                        11.7   7.41  )-----------( 181      ( 28 Mar 2021 07:09 )             34.7     03-28    140  |  106  |  89<HH>  ----------------------------<  110<H>  4.9   |  25  |  1.6<H>    Ca    8.4<L>      28 Mar 2021 07:09    TPro  5.8<L>  /  Alb  3.2<L>  /  TBili  0.7  /  DBili  0.2  /  AST  61<H>  /  ALT  92<H>  /  AlkPhos  79  03-28    PT/INR - ( 28 Mar 2021 07:09 )   PT: 11.30 sec;   INR: 0.98 ratio               PHYSICAL EXAM:  GEN: NAD, comfortable  LUNGS: bilateral equal breath sounds, rales bilaterally   HEART: RRR, s1 and s2 appreciated, no m/r/g  ABD: soft, NT/ND, +BS  EXT: b/l AURA  NEURO: AAOX3

## 2021-03-28 NOTE — PROGRESS NOTE ADULT - ASSESSMENT
Acute severe COVID pneumonia  Acute hypoxemic respiratory failure  Acute on CKD (baseline serum creatinine 1.5 mg/dL), COVID vs. contrast as reason for acute kidney injury  Systolic CHF - usually NYHA Class 2-3 now Class 4    Suggest:    Minimize fluid intake  Continue Jane   Can use IV Lasix to drive urine output but watch blood pressure closely

## 2021-03-28 NOTE — PROGRESS NOTE ADULT - SUBJECTIVE AND OBJECTIVE BOX
Patient is a 70y old  Male who presents with a chief complaint of SOB (26 Mar 2021 11:57)    HPI:  71 yo M with PMHx of CKD (Unknown baseline), HFrEF (On Entresto), HTN, HLD, DM (A1c 6.5 per patient), LGIB secondary to Diverticulitis s/p colon resection (at [facility] about 6.5 years ago) and CAD presenting today with SOB. Patient states he has had progressive SOB x 6 weeks however it has worsened in the last 2 weeks. Patient states he developed chills, and productive cough x 2 days. Endorses diarrhea and diffuse abdominal discomfort x 6 weeks as well. Denies chest pain though    Vital Signs Last 24 Hrs  T(F): 99.5 (19 Mar 2021 12:07), Max: 102.3 (19 Mar 2021 08:53)  HR: 71 (19 Mar 2021 12:07) (71 - 93)  BP: 150/92 (19 Mar 2021 12:07) (136/84 - 150/92)  RR: 24 (19 Mar 2021 13:18) (20 - 24)  SpO2: 100% (19 Mar 2021 13:18) (92% - 100%)    In ED patient was received septic on admission (HR 93, Temp 102.3, RR 20, WBC <2k, COVID +) lactate 1.1  Was hypoxic to 84% with minimal exertion in the stretcher. Patient on high-lina at this time. Patient initially saturating at 96% on 3L NC; however later desatted requiring over 6L NC. Patient now on high-lina. No signs of right heart strain on POCUS.  CXR showed b/l opcities. Was unable to tolerate CTA ruled out PE.  Labs are significant for hyperkalemia 6.1, with tall T waves on my read, Calcium gluconate is ordered STAT and insulin push was already given by ER.  Dimer 562, pro-BNP 2718, Trop 0.18, Cr 2.9   (19 Mar 2021 14:40)    PAST MEDICAL & SURGICAL HISTORY:  Diabetes  Dyslipidemia  Hypertension  Heart failure  Diverticulitis  S/P partial resection of colon    patient seen and examined independently on morning rounds, chart reviewed and discussed with the medicine resident and on interdisciplinary rounds.     still intermittently sob and hypoxic but slowly feeling better- no chest pain- remains on HF 50/80%  PE:  GEN-NAD, AAOx3, oob in chair on HF- resting comfortably  PULM- decreased air entry bilateral bases with trace crackles   CVS- +s1/s2 RRR  GI- soft NTobese ND +bs, no rebound, no guarding  EXT- 1+_edema, bilateral dressing LE        Labs:                        11.7   7.41  )-----------( 181      ( 28 Mar 2021 07:09 )             34.7     CBC Full  -  ( 28 Mar 2021 07:09 )  WBC Count : 7.41 K/uL  RBC Count : 4.32 M/uL  Hemoglobin : 11.7 g/dL  Hematocrit : 34.7 %  Platelet Count - Automated : 181 K/uL  Mean Cell Volume : 80.3 fL  Mean Cell Hemoglobin : 27.1 pg  Mean Cell Hemoglobin Concentration : 33.7 g/dL  Auto Neutrophil # : 5.57 K/uL  Auto Lymphocyte # : 0.86 K/uL  Auto Monocyte # : 0.43 K/uL  Auto Eosinophil # : 0.01 K/uL  Auto Basophil # : 0.04 K/uL  Auto Neutrophil % : 75.3 %  Auto Lymphocyte % : 11.6 %  Auto Monocyte % : 5.8 %  Auto Eosinophil % : 0.1 %  Auto Basophil % : 0.5 %      03-28    140  |  106  |  89<HH>  ----------------------------<  110<H>  4.9   |  25  |  1.6<H>    Ca    8.4<L>      28 Mar 2021 07:09    TPro  5.8<L>  /  Alb  3.2<L>  /  TBili  0.7  /  DBili  0.2  /  AST  61<H>  /  ALT  92<H>  /  AlkPhos  79  03-28      Microbiology:      Vital Signs Last 24 Hrs  T(C): 36.2 (28 Mar 2021 12:50), Max: 36.4 (27 Mar 2021 20:36)  T(F): 97.2 (28 Mar 2021 12:50), Max: 97.6 (27 Mar 2021 20:36)  HR: 81 (28 Mar 2021 12:50) (61 - 81)  BP: 129/82 (28 Mar 2021 12:50) (101/70 - 129/82)  BP(mean): --  RR: 20 (28 Mar 2021 08:33) (17 - 20)  SpO2: 94% (28 Mar 2021 08:33) (93% - 96%)    I&O's Summary    27 Mar 2021 07:01  -  28 Mar 2021 07:00  --------------------------------------------------------  IN: 340 mL / OUT: 3125 mL / NET: -2785 mL    28 Mar 2021 07:01  -  28 Mar 2021 15:40  --------------------------------------------------------  IN: 0 mL / OUT: 1050 mL / NET: -1050 mL     Estimated Blood Loss (Cc): minimal

## 2021-03-28 NOTE — PROGRESS NOTE ADULT - SUBJECTIVE AND OBJECTIVE BOX
[facility]PHR[facility] FOLLOW UP NOTE  --------------------------------------------------------------------------------  24 hour events/subjective: Patient examined. Appears comfortable.    PAST HISTORY  --------------------------------------------------------------------------------  No significant changes to PMH, PSH, FHx, SHx, unless otherwise noted    ALLERGIES & MEDICATIONS  --------------------------------------------------------------------------------  Allergies    ACE inhibitors (Angioedema)    Standing Inpatient Medications  aspirin enteric coated 81 milliGRAM(s) Oral daily  atorvastatin 80 milliGRAM(s) Oral at bedtime  chlorhexidine 4% Liquid 1 Application(s) Topical daily  dexAMETHasone  Injectable 6 milliGRAM(s) IV Push daily  dextrose 40% Gel 15 Gram(s) Oral once  dextrose 5%. 1000 milliLiter(s) IV Continuous <Continuous>  dextrose 5%. 1000 milliLiter(s) IV Continuous <Continuous>  dextrose 50% Injectable 25 Gram(s) IV Push once  dextrose 50% Injectable 12.5 Gram(s) IV Push once  dextrose 50% Injectable 25 Gram(s) IV Push once  furosemide   Injectable 40 milliGRAM(s) IV Push daily  glucagon  Injectable 1 milliGRAM(s) IntraMuscular once  heparin   Injectable 5000 Unit(s) SubCutaneous every 8 hours  influenza   Vaccine 0.5 milliLiter(s) IntraMuscular once  insulin lispro (ADMELOG) corrective regimen sliding scale   SubCutaneous three times a day before meals  melatonin 5 milliGRAM(s) Oral at bedtime  metoprolol succinate ER 25 milliGRAM(s) Oral daily  polyethylene glycol 3350 17 Gram(s) Oral every 12 hours  senna 2 Tablet(s) Oral at bedtime  sodium zirconium cyclosilicate 10 Gram(s) Oral daily  zolpidem 5 milliGRAM(s) Oral at bedtime    PRN Inpatient Medications  acetaminophen   Tablet .. 650 milliGRAM(s) Oral every 8 hours PRN  guaifenesin/dextromethorphan  Syrup 5 milliLiter(s) Oral every 6 hours PRN    VITALS/PHYSICAL EXAM  --------------------------------------------------------------------------------  T(C): 36.2 (03-28-21 @ 05:00), Max: 36.4 (03-27-21 @ 20:36)  HR: 61 (03-28-21 @ 08:33) (61 - 78)  BP: 113/57 (03-28-21 @ 05:00) (101/70 - 113/57)  RR: 20 (03-28-21 @ 08:33) (17 - 20)  SpO2: 94% (03-28-21 @ 08:33) (93% - 96%)    03-27-21 @ 07:01  -  03-28-21 @ 07:00  --------------------------------------------------------  IN: 340 mL / OUT: 3125 mL / NET: -2785 mL    03-28-21 @ 07:01  -  03-28-21 @ 12:35  --------------------------------------------------------  IN: 0 mL / OUT: 800 mL / NET: -800 mL    Physical Exam:  	Gen: NAD  	Pulm: CTA B/L  	CV: RRR, S1S2  	Abd: +BS, soft, nontender/nondistended  	: No suprapubic tenderness  	LE: Warm, no edema    LABS/STUDIES  --------------------------------------------------------------------------------              11.7   7.41  >-----------<  181      [03-28-21 @ 07:09]              34.7     140  |  106  |  89  ----------------------------<  110      [03-28-21 @ 07:09]  4.9   |  25  |  1.6        Ca     8.4     [03-28-21 @ 07:09]    TPro  5.8  /  Alb  3.2  /  TBili  0.7  /  DBili  0.2  /  AST  61  /  ALT  92  /  AlkPhos  79  [03-28-21 @ 07:09]    PT/INR: PT 11.30, INR 0.98       [03-28-21 @ 07:09]    Creatinine Trend:  SCr 1.6 [03-28 @ 07:09]  SCr 1.6 [03-27 @ 08:36]  SCr 1.9 [03-26 @ 04:30]  SCr 2.3 [03-25 @ 06:06]  SCr 2.9 [03-24 @ 08:44]    Urinalysis - [03-23-21 @ 09:35]      Color Yellow / Appearance Clear / SG 1.016 / pH 6.0      Gluc Negative / Ketone Negative  / Bili Negative / Urobili <2 mg/dL       Blood Small / Protein 300 mg/dL / Leuk Est Negative / Nitrite Negative      RBC 3 / WBC 3 / Hyaline 3 / Gran  / Sq Epi  / Non Sq Epi 1 / Bacteria Negative    Urine Creatinine 110      [03-23-21 @ 09:35]  Urine Sodium <20.0      [03-23-21 @ 09:35]  Urine Urea Nitrogen 588      [03-23-21 @ 09:35]  Urine Osmolality 339      [03-23-21 @ 09:35]    Ferritin 1478      [03-19-21 @ 09:46]  HbA1c 6.1      [11-23-19 @ 08:02]  TSH 0.46      [03-20-21 @ 04:30]  Lipid: chol 87, TG 64, HDL 41, LDL --      [03-20-21 @ 04:30]

## 2021-03-29 NOTE — CONSULT NOTE ADULT - ASSESSMENT
71 yo M with PMHx of CKD (Unknown baseline), HFrEF, HTN, CAD, HLD, DM, LGIB secondary to Diverticulitis s/p colon resection.     Acute on chronic HFrEF    Patient is fluid overloaded on exam  Increase IV Furosemide to 80 mg BID  Continue Metoprolol ER 25 mg Daily  Get BMP twice daily    Maintain potassium >4.0, Mg >2.1  Strict intake and output  Daily weight   Obtain Iron profile  Pending echo for further recommendations   Will continue to follow  69 yo M with PMHx of CKD (Unknown baseline), HFrEF, HTN, CAD, HLD, DM, LGIB secondary to Diverticulitis s/p colon resection.     Acute on chronic HFrEF/ Respiratory failure on HFNC/ SAGE / transaminitis     Patient is fluid overloaded on exam  Bumex 2 mg iv BID  Continue Metoprolol ER 25 mg Daily  Get BMP twice daily    Maintain potassium >4.0, Mg >2.1  Strict intake and output  Daily weight   Obtain Iron profile  Further recommendations pending TTE  Will continue to follow  69 yo M with PMHx of CKD (Unknown baseline), HFrEF, HTN, CAD, HLD, DM, LGIB secondary to Diverticulitis s/p colon resection.     Acute on chronic HFrEF/ Respiratory failure on HFNC/ SAGE / transaminitis     Patient is fluid overloaded on exam  Bumex 2 mg iv BID  Continue Metoprolol ER 25 mg Daily  Get BMP twice daily    Maintain potassium >4.0, Mg >2.1  Strict intake and output  Daily weight   Obtain Iron profile  Further recommendations pending TTE  Discussed with primary team  Will continue to follow

## 2021-03-29 NOTE — PROGRESS NOTE ADULT - SUBJECTIVE AND OBJECTIVE BOX
[facility] NEPHROLOGY FOLLOW UP NOTE  --------------------------------------------------------------------------------  24 hour events/subjective: Patient examined. Appears comfortable.    PAST HISTORY  --------------------------------------------------------------------------------  No significant changes to PMH, PSH, FHx, SHx, unless otherwise noted    ALLERGIES & MEDICATIONS  --------------------------------------------------------------------------------  Allergies    ACE inhibitors (Angioedema)    Intolerances      Standing Inpatient Medications  aspirin enteric coated 81 milliGRAM(s) Oral daily  atorvastatin 80 milliGRAM(s) Oral at bedtime  chlorhexidine 4% Liquid 1 Application(s) Topical daily  dexAMETHasone  Injectable 6 milliGRAM(s) IV Push daily  dextrose 40% Gel 15 Gram(s) Oral once  dextrose 5%. 1000 milliLiter(s) IV Continuous <Continuous>  dextrose 5%. 1000 milliLiter(s) IV Continuous <Continuous>  dextrose 50% Injectable 25 Gram(s) IV Push once  dextrose 50% Injectable 12.5 Gram(s) IV Push once  dextrose 50% Injectable 25 Gram(s) IV Push once  furosemide   Injectable 40 milliGRAM(s) IV Push every 12 hours  glucagon  Injectable 1 milliGRAM(s) IntraMuscular once  heparin   Injectable 5000 Unit(s) SubCutaneous every 8 hours  influenza   Vaccine 0.5 milliLiter(s) IntraMuscular once  insulin lispro (ADMELOG) corrective regimen sliding scale   SubCutaneous three times a day before meals  melatonin 5 milliGRAM(s) Oral at bedtime  metoprolol succinate ER 25 milliGRAM(s) Oral daily  polyethylene glycol 3350 17 Gram(s) Oral every 12 hours  senna 2 Tablet(s) Oral at bedtime  sodium zirconium cyclosilicate 10 Gram(s) Oral daily  zolpidem 5 milliGRAM(s) Oral at bedtime    PRN Inpatient Medications  acetaminophen   Tablet .. 650 milliGRAM(s) Oral every 8 hours PRN  guaifenesin/dextromethorphan  Syrup 5 milliLiter(s) Oral every 6 hours PRN      VITALS/PHYSICAL EXAM  --------------------------------------------------------------------------------  T(C): 35.8 (03-29-21 @ 05:00), Max: 36.2 (03-28-21 @ 12:50)  HR: 71 (03-29-21 @ 08:47) (58 - 83)  BP: 132/80 (03-29-21 @ 05:00) (113/51 - 132/80)  RR: 18 (03-29-21 @ 08:47) (18 - 19)  SpO2: 91% (03-29-21 @ 08:47) (91% - 96%)    03-28-21 @ 07:01  -  03-29-21 @ 07:00  --------------------------------------------------------  IN: 240 mL / OUT: 2825 mL / NET: -2585 mL    03-29-21 @ 07:01  -  03-29-21 @ 12:34  --------------------------------------------------------  IN: 330 mL / OUT: 1300 mL / NET: -970 mL    Physical Exam:  	Gen: NAD  	Pulm: CTA B/L  	CV: RRR, S1S2  	Abd: +BS, soft, nontender/nondistended  	: No suprapubic tenderness  	LE: Warm, edema    LABS/STUDIES  --------------------------------------------------------------------------------              12.5   10.37 >-----------<  177      [03-29-21 @ 09:18]              36.7     139  |  105  |  75  ----------------------------<  229      [03-29-21 @ 09:18]  5.5   |  24  |  1.4        Ca     8.8     [03-29-21 @ 09:18]    TPro  6.3  /  Alb  3.4  /  TBili  0.9  /  DBili  0.3  /  AST  54  /  ALT  97  /  AlkPhos  86  [03-29-21 @ 09:18]    PT/INR: PT 11.50, INR 1.00       [03-29-21 @ 09:18]    Creatinine Trend:  SCr 1.4 [03-29 @ 09:18]  SCr 1.6 [03-28 @ 07:09]  SCr 1.6 [03-27 @ 08:36]  SCr 1.9 [03-26 @ 04:30]  SCr 2.3 [03-25 @ 06:06]    Urinalysis - [03-23-21 @ 09:35]      Color Yellow / Appearance Clear / SG 1.016 / pH 6.0      Gluc Negative / Ketone Negative  / Bili Negative / Urobili <2 mg/dL       Blood Small / Protein 300 mg/dL / Leuk Est Negative / Nitrite Negative      RBC 3 / WBC 3 / Hyaline 3 / Gran  / Sq Epi  / Non Sq Epi 1 / Bacteria Negative    Urine Creatinine 110      [03-23-21 @ 09:35]  Urine Sodium <20.0      [03-23-21 @ 09:35]  Urine Urea Nitrogen 588      [03-23-21 @ 09:35]  Urine Osmolality 339      [03-23-21 @ 09:35]    Ferritin 1478      [03-19-21 @ 09:46]  HbA1c 6.1      [11-23-19 @ 08:02]  TSH 0.46      [03-20-21 @ 04:30]  Lipid: chol 87, TG 64, HDL 41, LDL --      [03-20-21 @ 04:30]

## 2021-03-29 NOTE — PROGRESS NOTE ADULT - ASSESSMENT
70 year old male patient with CKD, HFrEF, HTN, DM, CAD, and DM who presented on 03/19 with few days of shortness of breath, found to have COVID pneumonia, admitted for hypoxic RF secondary to COVID pneumonia.     # Acute Hypoxemic Respiratory Failure Secondary to COVID Pneumonia  - Afebrile, Lymphopenia, Transaminitis, On HFNC - attempt to titrate down as tolerated  - CT angio chest: Negative for PE, VA duplex negative for DVT  - COVID Antibody positive, no indications for plasma  - s/p 1 dose of RDV, stopped secondary to low GFR  - s/p course of azithro and ceftriaxone  - c/w dexa  - repeat inflammatory markers    # HFrEF - volume overloaded  * Home meds: Lasix 20mg 2x/week, Entresto 97,103mg BID, Toprol 25mg QD  - Pro-BNP 2718  - change diet w/ fluid restriction  - strict I's and O's, Daily weights  - consult HF specialist  - f/u Echo  - increased lasix to 40mg BID    # SAGE on CKD - improved   * Baseline Cr 1.5, peaked at 3.5 now downtrending back to baseline  - nephro following  - CT a/p negative for hydro  - no need for dialysis    HTN  * Home meds: Entresto 97, 103mg BID, Lasix 20mg 2x/week, Toprol 25mg QD  - will restart Entresto tomorrow if Creatinine stable  - Monitor BP   - Continue Toprol 25mg QD    # DM II  - A1C: 6.9  - if FS >180 start basal - bolus coverage     # CAD//HLD  - c/w ASA, statin, BB      # DVT ppx - Heparin SQ   # GI ppx - PPI   # Diet - DASH/CC fluid restriction  # COVID test - Positive   Full code.  70 year old male patient with CKD, HFrEF, HTN, DM, CAD, and DM who presented on 03/19 with few days of shortness of breath, found to have COVID pneumonia, admitted for hypoxic RF secondary to COVID pneumonia.     # Acute Hypoxemic Respiratory Failure Secondary to COVID Pneumonia  - Afebrile, Lymphopenia, Transaminitis, On HFNC - attempt to titrate down as tolerated  - CT angio chest: Negative for PE, VA duplex negative for DVT  - COVID Antibody positive, no indications for plasma  - s/p 1 dose of RDV, stopped secondary to low GFR  - s/p course of azithro and ceftriaxone  - c/w dexa  - repeat inflammatory markers    # HFrEF - volume overloaded  * Home meds: Lasix 20mg 2x/week, Entresto 97,103mg BID, Toprol 25mg QD  - Pro-BNP 2718  - change diet w/ fluid restriction  - strict I's and O's, Daily weights  - consult HF specialist  - f/u Echo  - increased lasix to 40mg BID    # SAGE on CKD - improved   * Baseline Cr 1.5, peaked at 3.5 now downtrending back to baseline  - nephro following  - CT a/p negative for hydro  - no need for dialysis    HTN  * Home meds: Entresto 97, 103mg BID, Lasix 20mg 2x/week, Toprol 25mg QD  - will restart Entresto when more stable (K elevated today)  - Monitor BP   - Continue Toprol 25mg QD    # DM II  - A1C: 6.9  - if FS >180 start basal - bolus coverage     # CAD//HLD  - c/w ASA, statin, BB      # DVT ppx - Heparin SQ   # GI ppx - PPI   # Diet - DASH/CC fluid restriction  # COVID test - Positive   Full code.

## 2021-03-29 NOTE — CONSULT NOTE ADULT - SUBJECTIVE AND OBJECTIVE BOX
Date of Admission: 3/19/21      HISTORY OF PRESENT ILLNESS:     71 yo M with PMHx of CKD (Unknown baseline), HFrEF (On Entresto), HTN, HLD, DM (A1c 6.5 per patient), LGIB secondary to Diverticulitis s/p colon resection (at [facility] about 6.5 years ago) and CAD presenting today with SOB. Patient states he has had progressive SOB x 6 weeks however it has worsened in the last 2 weeks. Patient states he developed chills, and productive cough x 2 days. Endorses diarrhea and diffuse abdominal discomfort x 6 weeks as well. Denies chest pain though      PAST MEDICAL & SURGICAL HISTORY:    Diabetes  Dyslipidemia  Hypertension  Heart failure  Diverticulitis  S/P partial resection of colon    FAMILY HISTORY:  [ ] no pertinent family history of premature cardiovascular disease in first degree relatives.  Mother:   Father:   Siblings:     SOCIAL HISTORY:    [ ] Non-smoker  [ ] Smoker  [ ] Alcohol    Allergies    ACE inhibitors (Angioedema)    Intolerances      REVIEW OF SYSTEMS:    CONSTITUTIONAL: No fever, weight loss, or fatigue  CARDIOLOGY: denies chest pain, shortness of breath or syncopal episodes.   RESPIRATORY: denies shortness of breath  NEUROLOGICAL:  no weakness, no focal deficits to report.  ENDOCRINOLOGICAL: no recent change in diabetic medications.   GI: no BRBPR, no N,V, diarrhea.    PSYCHIATRY: normal mood and affect  HEENT: no nasal discharge, no ecchymosis  SKIN: no ecchymosis, no breakdown  MUSCULOSKELETAL: Full range of motion x4.     PHYSICAL EXAM:    General Appearance: well appearing, normal for age and gender. 	  Neck: normal JVP, no bruit.   Eyes: No xanthomalasia, Extra Ocular muscles intact.   Cardiovascular: regular rate and rhythm S1 S2, No JVD, No murmurs, No edema  Respiratory: Lungs clear to auscultation	  Psychiatry: Alert and oriented x 3, Mood & affect appropriate  Gastrointestinal:  Soft, Non-tender  Skin/Integumen: No rashes, No ecchymoses, No cyanosis	  Neurologic: Non-focal  Musculoskeletal/ extremities: Normal range of motion, No clubbing, cyanosis or edema  Vascular: Peripheral pulses palpable 2+ bilaterally    TELEMETRY EVENTS: 	    ECG:  	  RADIOLOGY:  OTHER: 	    PREVIOUS DIAGNOSTIC TESTING:    [ ] Echocardiogram:  [ ]  Catheterization:  [ ] Stress Test:  	  	    Home Medications:  Aspir 81 oral delayed release tablet: 1 tab(s) orally once a day (19 Mar 2021 16:01)  atorvastatin 80 mg oral tablet: 1 tab(s) orally once a day (19 Mar 2021 16:01)  Entresto 97 mg-103 mg oral tablet: 1 tab(s) orally 2 times a day (19 Mar 2021 16:01)  ezetimibe 10 mg oral tablet: 1 tab(s) orally once a day (19 Mar 2021 16:01)  Lasix 20 mg oral tablet: 1 tab(s) orally 2 times a week (19 Mar 2021 16:01)  metoprolol succinate 25 mg oral tablet, extended release: 1 tab(s) orally once a day (19 Mar 2021 16:01)  Vitamin C 1000 mg oral tablet: 1 tab(s) orally once a day (19 Mar 2021 16:01)  Vitamin D3 5000 intl units oral tablet: 1 tab(s) orally once a day (19 Mar 2021 16:01)     Date of Admission: 3/19/21      HISTORY OF PRESENT ILLNESS:     69 yo M with PMHx of CKD (Unknown baseline), HFrEF, HTN, HLD, DM, LGIB secondary to Diverticulitis s/p colon resection (at [facility] about 6.5 years ago) and CAD presenting today with SOB. Patient states he has had progressive SOB that has worsened in the last 2 weeks. Patient states he developed chills, productive cough, orthopnea, diarrhea, and diffuse abdominal discomfort x 6 weeks. Denies c/p, palpitations, LH/dizziness, LOC.       PAST MEDICAL & SURGICAL HISTORY:    Diabetes  Dyslipidemia  Hypertension  Heart failure  Diverticulitis  S/P partial resection of colon    FAMILY HISTORY:    Mother:  at 30 of heart disease  Father:  at 60 of kidney    SOCIAL HISTORY:      Former smoker, history of alcohol and drug use quit 17 years ago    Allergies    ACE inhibitors (Angioedema)    Intolerances      REVIEW OF SYSTEMS:    CONSTITUTIONAL: No fever, weight loss, or fatigue  CARDIOLOGY: denies chest pain, + shortness of breath, no syncopal episodes.   RESPIRATORY: + shortness of breath  NEUROLOGICAL:  no weakness, no focal deficits to report.  ENDOCRINOLOGICAL: no recent change in diabetic medications.   GI: no BRBPR, no N,V, + diarrhea.    PSYCHIATRY: normal mood and affect  HEENT: no nasal discharge, no ecchymosis  SKIN: no ecchymosis, no breakdown  MUSCULOSKELETAL: Full range of motion x4.     PHYSICAL EXAM:    General Appearance: well appearing, normal for age and gender. 	  Neck: normal JVP, no bruit.   Eyes:  Extra Ocular muscles intact.   Cardiovascular: regular rate and rhythm S1 S2, unable to assess JVD, No murmurs, + BLE edema  Respiratory: Lungs clear to auscultation	  Psychiatry: Alert and oriented x 3, Mood & affect appropriate  Gastrointestinal:  Soft, Non-tender  Skin/Integumen: No rashes, No ecchymoses, No cyanosis	  Neurologic: Non-focal  Musculoskeletal/ extremities: Normal range of motion, No clubbing, cyanosis, + BLE edema  Vascular: Peripheral pulses palpable 2+ bilaterally  	  PREVIOUS DIAGNOSTIC TESTING:        [ ] Echocardiogram:  [ ]  Catheterization:  [ ] Stress Test:  	  	    Home Medications:  Aspir 81 oral delayed release tablet: 1 tab(s) orally once a day (19 Mar 2021 16:01)  atorvastatin 80 mg oral tablet: 1 tab(s) orally once a day (19 Mar 2021 16:01)  Entresto 97 mg-103 mg oral tablet: 1 tab(s) orally 2 times a day (19 Mar 2021 16:01)  ezetimibe 10 mg oral tablet: 1 tab(s) orally once a day (19 Mar 2021 16:01)  Lasix 20 mg oral tablet: 1 tab(s) orally 2 times a week (19 Mar 2021 16:01)  metoprolol succinate 25 mg oral tablet, extended release: 1 tab(s) orally once a day (19 Mar 2021 16:01)  Vitamin C 1000 mg oral tablet: 1 tab(s) orally once a day (19 Mar 2021 16:01)  Vitamin D3 5000 intl units oral tablet: 1 tab(s) orally once a day (19 Mar 2021 16:01)

## 2021-03-29 NOTE — PROGRESS NOTE ADULT - SUBJECTIVE AND OBJECTIVE BOX
Hospital Day:  10d    Subjective: Patient is a 70y old  Male who presents with a chief complaint of SOB (29 Mar 2021 09:58)    Pt seen and evaluated at bedside.   Complaints: cough  Over the night Events: none    Past Medical Hx:   Diabetes    Dyslipidemia    Hyperlipemia    Hypertension    Heart failure    Diverticulitis    No pertinent past medical history      Past Sx:  S/P partial resection of colon    No significant past surgical history      Allergies:  ACE inhibitors (Angioedema)    Current Meds:   Standng Meds:  aspirin enteric coated 81 milliGRAM(s) Oral daily  atorvastatin 80 milliGRAM(s) Oral at bedtime  chlorhexidine 4% Liquid 1 Application(s) Topical daily  dexAMETHasone  Injectable 6 milliGRAM(s) IV Push daily  dextrose 40% Gel 15 Gram(s) Oral once  dextrose 5%. 1000 milliLiter(s) (50 mL/Hr) IV Continuous <Continuous>  dextrose 5%. 1000 milliLiter(s) (100 mL/Hr) IV Continuous <Continuous>  dextrose 50% Injectable 25 Gram(s) IV Push once  dextrose 50% Injectable 12.5 Gram(s) IV Push once  dextrose 50% Injectable 25 Gram(s) IV Push once  furosemide   Injectable 40 milliGRAM(s) IV Push every 12 hours  glucagon  Injectable 1 milliGRAM(s) IntraMuscular once  heparin   Injectable 5000 Unit(s) SubCutaneous every 8 hours  influenza   Vaccine 0.5 milliLiter(s) IntraMuscular once  insulin lispro (ADMELOG) corrective regimen sliding scale   SubCutaneous three times a day before meals  melatonin 5 milliGRAM(s) Oral at bedtime  metoprolol succinate ER 25 milliGRAM(s) Oral daily  polyethylene glycol 3350 17 Gram(s) Oral every 12 hours  senna 2 Tablet(s) Oral at bedtime  sodium zirconium cyclosilicate 10 Gram(s) Oral daily  zolpidem 5 milliGRAM(s) Oral at bedtime    PRN Meds:  acetaminophen   Tablet .. 650 milliGRAM(s) Oral every 8 hours PRN Mild Pain (1 - 3)  guaifenesin/dextromethorphan  Syrup 5 milliLiter(s) Oral every 6 hours PRN Cough      Vital Signs:   T(F): 96.4 (03-29-21 @ 05:00), Max: 97.2 (03-28-21 @ 12:50)  HR: 71 (03-29-21 @ 08:47) (58 - 83)  BP: 132/80 (03-29-21 @ 05:00) (113/51 - 132/80)  RR: 18 (03-29-21 @ 08:47) (18 - 19)  SpO2: 91% (03-29-21 @ 08:47) (91% - 96%)    Physical Exam:   GENERAL: NAD, Resting in bed  HEENT: NCAT on HFNC  CHEST/LUNG: Clear to auscultation bilaterally; No wheezing or rubs.   HEART: Regular rate and rhythm; No murmurs, rubs, or gallops  ABDOMEN: Bowel sounds present; Soft, Nontender, Nondistended.   EXTREMITIES:  b/l LE edema   NERVOUS SYSTEM:  Alert & Oriented X3    FLUID BALANCE    03-27-21 @ 07:01  -  03-28-21 @ 07:00  --------------------------------------------------------  IN: 340 mL / OUT: 3125 mL / NET: -2785 mL    03-28-21 @ 07:01  -  03-29-21 @ 07:00  --------------------------------------------------------  IN: 240 mL / OUT: 2825 mL / NET: -2585 mL    03-29-21 @ 07:01  -  03-29-21 @ 10:49  --------------------------------------------------------  IN: 0 mL / OUT: 800 mL / NET: -800 mL      Labs:                         12.5   10.37 )-----------( 177      ( 29 Mar 2021 09:18 )             36.7     Neutophil% 83.8, Lymphocyte% 5.6, Monocyte% 3.9, Bands% 6.0 03-29-21 @ 09:18    29 Mar 2021 09:18    139    |  105    |  x      ----------------------------<  229    5.5     |  24     |  1.4      Ca    8.8        29 Mar 2021 09:18    TPro  6.3    /  Alb  3.4    /  TBili  0.9    /  DBili  0.3    /  AST  54     /  ALT  97     /  AlkPhos  86     29 Mar 2021 09:18       pTT    --             ----< 1.00 INR  (03-29-21 @ 09:18)    11.50        PT    D-Dimer Assay, Quantitative: 633 ng/mL DDU (03-26-21 @ 04:30)    Radiology:   < from: Xray Chest 1 View- PORTABLE-Routine (Xray Chest 1 View- PORTABLE-Routine .) (03.28.21 @ 10:39) >  Impression: Bilateral opacities decreased. No pleural effusion or air leak  < end of copied text >    < from: VA Duplex Lower Ext Vein Scan, Bilat (03.26.21 @ 12:32) >  Impression: No evidence of deep venous thrombosis or superficial thrombophlebitis in the bilateral lower extremities.  < end of copied text >   Hospital Day:  10d    Subjective: Patient is a 70y old  Male who presents with a chief complaint of SOB (29 Mar 2021 09:58)    Pt seen and evaluated at bedside.   Complaints: cough  Over the night Events: none    Past Medical Hx:   Diabetes    Dyslipidemia    Hyperlipemia    Hypertension    Heart failure    Diverticulitis      Past Sx:  S/P partial resection of colon        Allergies:  ACE inhibitors (Angioedema)    Current Meds:   Standng Meds:  aspirin enteric coated 81 milliGRAM(s) Oral daily  atorvastatin 80 milliGRAM(s) Oral at bedtime  chlorhexidine 4% Liquid 1 Application(s) Topical daily  dexAMETHasone  Injectable 6 milliGRAM(s) IV Push daily  dextrose 40% Gel 15 Gram(s) Oral once  dextrose 5%. 1000 milliLiter(s) (50 mL/Hr) IV Continuous <Continuous>  dextrose 5%. 1000 milliLiter(s) (100 mL/Hr) IV Continuous <Continuous>  dextrose 50% Injectable 25 Gram(s) IV Push once  dextrose 50% Injectable 12.5 Gram(s) IV Push once  dextrose 50% Injectable 25 Gram(s) IV Push once  furosemide   Injectable 40 milliGRAM(s) IV Push every 12 hours  glucagon  Injectable 1 milliGRAM(s) IntraMuscular once  heparin   Injectable 5000 Unit(s) SubCutaneous every 8 hours  influenza   Vaccine 0.5 milliLiter(s) IntraMuscular once  insulin lispro (ADMELOG) corrective regimen sliding scale   SubCutaneous three times a day before meals  melatonin 5 milliGRAM(s) Oral at bedtime  metoprolol succinate ER 25 milliGRAM(s) Oral daily  polyethylene glycol 3350 17 Gram(s) Oral every 12 hours  senna 2 Tablet(s) Oral at bedtime  sodium zirconium cyclosilicate 10 Gram(s) Oral daily  zolpidem 5 milliGRAM(s) Oral at bedtime    PRN Meds:  acetaminophen   Tablet .. 650 milliGRAM(s) Oral every 8 hours PRN Mild Pain (1 - 3)  guaifenesin/dextromethorphan  Syrup 5 milliLiter(s) Oral every 6 hours PRN Cough      Vital Signs:   T(F): 96.4 (03-29-21 @ 05:00), Max: 97.2 (03-28-21 @ 12:50)  HR: 71 (03-29-21 @ 08:47) (58 - 83)  BP: 132/80 (03-29-21 @ 05:00) (113/51 - 132/80)  RR: 18 (03-29-21 @ 08:47) (18 - 19)  SpO2: 91% (03-29-21 @ 08:47) (91% - 96%) on HFNC 50L/85%    Physical Exam:   GENERAL: NAD, Resting in bed  HEENT: NCAT on HFNC  CHEST/LUNG: Clear to auscultation bilaterally; No wheezing or rubs.   HEART: Regular rate and rhythm; No murmurs, rubs, or gallops  ABDOMEN: Bowel sounds present; Soft, Nontender, Nondistended.   EXTREMITIES:  b/l LE edema   NERVOUS SYSTEM:  Alert & Oriented X3    FLUID BALANCE    03-27-21 @ 07:01  -  03-28-21 @ 07:00  --------------------------------------------------------  IN: 340 mL / OUT: 3125 mL / NET: -2785 mL    03-28-21 @ 07:01  -  03-29-21 @ 07:00  --------------------------------------------------------  IN: 240 mL / OUT: 2825 mL / NET: -2585 mL    03-29-21 @ 07:01  -  03-29-21 @ 10:49  --------------------------------------------------------  IN: 0 mL / OUT: 800 mL / NET: -800 mL      Labs:                         12.5   10.37 )-----------( 177      ( 29 Mar 2021 09:18 )             36.7     Neutophil% 83.8, Lymphocyte% 5.6, Monocyte% 3.9, Bands% 6.0 03-29-21 @ 09:18    29 Mar 2021 09:18    139    |  105    |  x      ----------------------------<  229    5.5     |  24     |  1.4      Ca    8.8        29 Mar 2021 09:18    TPro  6.3    /  Alb  3.4    /  TBili  0.9    /  DBili  0.3    /  AST  54     /  ALT  97     /  AlkPhos  86     29 Mar 2021 09:18       pTT    --             ----< 1.00 INR  (03-29-21 @ 09:18)    11.50        PT    D-Dimer Assay, Quantitative: 633 ng/mL DDU (03-26-21 @ 04:30)    Radiology:   < from: Xray Chest 1 View- PORTABLE-Routine (Xray Chest 1 View- PORTABLE-Routine .) (03.28.21 @ 10:39) >  Impression: Bilateral opacities decreased. No pleural effusion or air leak  < end of copied text >    < from: VA Duplex Lower Ext Vein Scan, Bilat (03.26.21 @ 12:32) >  Impression: No evidence of deep venous thrombosis or superficial thrombophlebitis in the bilateral lower extremities.  < end of copied text >

## 2021-03-29 NOTE — PROGRESS NOTE ADULT - ASSESSMENT
Acute severe COVID pneumonia  Acute hypoxemic respiratory failure  Acute on CKD (baseline serum creatinine 1.5 mg/dL), COVID vs. contrast as reason for acute kidney injury  Systolic CHF - usually NYHA Class 2-3 now Class 4    Suggest:    Minimize fluid intake  Continue Jane   Can use IV Lasix to drive urine output but watch blood pressure closely  Daily BMP

## 2021-03-30 NOTE — PROGRESS NOTE ADULT - SUBJECTIVE AND OBJECTIVE BOX
SUBJECTIVE:    Patient is a 70y old  Male who presents with a chief complaint of SOB (30 Mar 2021 12:12)    Currently admitted to medicine with the primary diagnosis of Acute hypoxemic respiratory failure due to COVID-19/CHF     Today is hospital day 11d. Patient was seen and examined at bedside. This morning he is seen resting in his chair. He is a bit frustrated at his lack of progress, but is overall in agreement with the treatment plan    PAST MEDICAL & SURGICAL HISTORY  PAST MEDICAL & SURGICAL HISTORY:  Diverticulitis    Heart failure    Hypertension    Dyslipidemia    Diabetes    S/P partial resection of colon    ALLERGIES:  ACE inhibitors (Angioedema)    MEDICATIONS:  STANDING MEDICATIONS  aspirin enteric coated 81 milliGRAM(s) Oral daily  atorvastatin 80 milliGRAM(s) Oral at bedtime  buMETAnide Injectable 2 milliGRAM(s) IV Push every 12 hours  chlorhexidine 4% Liquid 1 Application(s) Topical daily  dexAMETHasone  Injectable 6 milliGRAM(s) IV Push daily  dextrose 40% Gel 15 Gram(s) Oral once  dextrose 5%. 1000 milliLiter(s) IV Continuous <Continuous>  dextrose 5%. 1000 milliLiter(s) IV Continuous <Continuous>  dextrose 50% Injectable 25 Gram(s) IV Push once  dextrose 50% Injectable 12.5 Gram(s) IV Push once  dextrose 50% Injectable 25 Gram(s) IV Push once  glucagon  Injectable 1 milliGRAM(s) IntraMuscular once  heparin   Injectable 5000 Unit(s) SubCutaneous every 8 hours  influenza   Vaccine 0.5 milliLiter(s) IntraMuscular once  insulin lispro (ADMELOG) corrective regimen sliding scale   SubCutaneous three times a day before meals  melatonin 5 milliGRAM(s) Oral at bedtime  metoprolol succinate ER 25 milliGRAM(s) Oral daily  polyethylene glycol 3350 17 Gram(s) Oral every 12 hours  senna 2 Tablet(s) Oral at bedtime  sodium zirconium cyclosilicate 10 Gram(s) Oral daily  zolpidem 5 milliGRAM(s) Oral at bedtime    PRN MEDICATIONS  acetaminophen   Tablet .. 650 milliGRAM(s) Oral every 8 hours PRN  guaifenesin/dextromethorphan  Syrup 5 milliLiter(s) Oral every 6 hours PRN    VITALS:   T(F): 97.7  HR: 84  BP: 142/81  RR: 18  SpO2: 97%    LABS:                        12.4   10.19 )-----------( 183      ( 30 Mar 2021 04:30 )             36.7     03-30    140  |  106  |  79<HH>  ----------------------------<  117<H>  5.3<H>   |  23  |  1.6<H>    Ca    9.0      30 Mar 2021 04:30  Mg     2.1     03-30    TPro  6.3  /  Alb  3.5  /  TBili  0.9  /  DBili  x   /  AST  62<H>  /  ALT  101<H>  /  AlkPhos  83  03-30    PT/INR - ( 29 Mar 2021 09:18 )   PT: 11.50 sec;   INR: 1.00 ratio      RADIOLOGY:  3/30 CXR -   Impression:    Bilateral airspace opacities, stable.      PHYSICAL EXAM:  GENERAL: NAD, speaks in full sentences. Mild respiratory distress  HEAD: Atraumatic  NECK: Supple  CHEST/LUNG: Slight crackles bilaterally   HEART: S1, S2; RRR; No murmurs, rubs, or gallops  ABDOMEN: BS+; Soft, Non-tender, Non-distended  EXTREMITIES:  2+ Peripheral Pulses. Prominent LE edema   PSYCH: AAOx3  NEUROLOGY: non-focal  SKIN: No rashes or lesions

## 2021-03-30 NOTE — PROGRESS NOTE ADULT - SUBJECTIVE AND OBJECTIVE BOX
[place] NEPHROLOGY FOLLOW UP NOTE  --------------------------------------------------------------------------------  24 hour events/subjective: Patient examined. Appears comfortable.    PAST HISTORY  --------------------------------------------------------------------------------  No significant changes to PMH, PSH, FHx, SHx, unless otherwise noted    ALLERGIES & MEDICATIONS  --------------------------------------------------------------------------------  Allergies    ACE inhibitors (Angioedema)    Standing Inpatient Medications  aspirin enteric coated 81 milliGRAM(s) Oral daily  atorvastatin 80 milliGRAM(s) Oral at bedtime  buMETAnide Injectable 2 milliGRAM(s) IV Push every 12 hours  chlorhexidine 4% Liquid 1 Application(s) Topical daily  dexAMETHasone  Injectable 6 milliGRAM(s) IV Push daily  dextrose 40% Gel 15 Gram(s) Oral once  dextrose 5%. 1000 milliLiter(s) IV Continuous <Continuous>  dextrose 5%. 1000 milliLiter(s) IV Continuous <Continuous>  dextrose 50% Injectable 25 Gram(s) IV Push once  dextrose 50% Injectable 12.5 Gram(s) IV Push once  dextrose 50% Injectable 25 Gram(s) IV Push once  glucagon  Injectable 1 milliGRAM(s) IntraMuscular once  heparin   Injectable 5000 Unit(s) SubCutaneous every 8 hours  influenza   Vaccine 0.5 milliLiter(s) IntraMuscular once  insulin lispro (ADMELOG) corrective regimen sliding scale   SubCutaneous three times a day before meals  melatonin 5 milliGRAM(s) Oral at bedtime  metoprolol succinate ER 25 milliGRAM(s) Oral daily  polyethylene glycol 3350 17 Gram(s) Oral every 12 hours  senna 2 Tablet(s) Oral at bedtime  sodium zirconium cyclosilicate 10 Gram(s) Oral daily  zolpidem 5 milliGRAM(s) Oral at bedtime    PRN Inpatient Medications  acetaminophen   Tablet .. 650 milliGRAM(s) Oral every 8 hours PRN  guaifenesin/dextromethorphan  Syrup 5 milliLiter(s) Oral every 6 hours PRN    VITALS/PHYSICAL EXAM  --------------------------------------------------------------------------------  T(C): 36.5 (03-30-21 @ 04:57), Max: 36.5 (03-30-21 @ 04:57)  HR: 84 (03-30-21 @ 05:26) (63 - 84)  BP: 142/81 (03-30-21 @ 04:57) (105/77 - 142/81)  RR: 18 (03-30-21 @ 04:57) (18 - 18)  SpO2: 97% (03-30-21 @ 07:48) (91% - 97%)    03-29-21 @ 07:01  -  03-30-21 @ 07:00  --------------------------------------------------------  IN: 690 mL / OUT: 3250 mL / NET: -2560 mL    03-30-21 @ 07:01  -  03-30-21 @ 11:08  --------------------------------------------------------  IN: 637 mL / OUT: 1150 mL / NET: -513 mL      Physical Exam:  	Gen: NAD  	Pulm: CTA B/L  	CV: RRR, S1S2  	Abd: +BS, soft, nontender/nondistended  	: No suprapubic tenderness  	LE: Warm,  edema    LABS/STUDIES  --------------------------------------------------------------------------------              12.4   10.19 >-----------<  183      [03-30-21 @ 04:30]              36.7     140  |  106  |  79  ----------------------------<  117      [03-30-21 @ 04:30]  5.3   |  23  |  1.6        Ca     9.0     [03-30-21 @ 04:30]      Mg     2.1     [03-30-21 @ 04:30]    TPro  6.3  /  Alb  3.5  /  TBili  0.9  /  DBili  x   /  AST  62  /  ALT  101  /  AlkPhos  83  [03-30-21 @ 04:30]    PT/INR: PT 11.50, INR 1.00       [03-29-21 @ 09:18]    Creatinine Trend:  SCr 1.6 [03-30 @ 04:30]  SCr 1.4 [03-29 @ 09:18]  SCr 1.6 [03-28 @ 07:09]  SCr 1.6 [03-27 @ 08:36]  SCr 1.9 [03-26 @ 04:30]    Urinalysis - [03-23-21 @ 09:35]      Color Yellow / Appearance Clear / SG 1.016 / pH 6.0      Gluc Negative / Ketone Negative  / Bili Negative / Urobili <2 mg/dL       Blood Small / Protein 300 mg/dL / Leuk Est Negative / Nitrite Negative      RBC 3 / WBC 3 / Hyaline 3 / Gran  / Sq Epi  / Non Sq Epi 1 / Bacteria Negative    Ferritin 1381      [03-29-21 @ 16:00]  HbA1c 6.1      [11-23-19 @ 08:02]  TSH 0.46      [03-20-21 @ 04:30]  Lipid: chol 87, TG 64, HDL 41, LDL --      [03-20-21 @ 04:30]

## 2021-03-30 NOTE — PROGRESS NOTE ADULT - SUBJECTIVE AND OBJECTIVE BOX
Date of Admission: 3/19/21    Interval History:        HISTORY OF PRESENT ILLNESS:     69 yo M with PMHx of CKD (Unknown baseline), HFrEF, HTN, HLD, DM, LGIB secondary to Diverticulitis s/p colon resection (at [facility] about 6.5 years ago) and CAD presenting today with SOB. Patient states he has had progressive SOB that has worsened in the last 2 weeks. Patient states he developed chills, productive cough, orthopnea, diarrhea, and diffuse abdominal discomfort x 6 weeks. Denies c/p, palpitations, LH/dizziness, LOC.       PAST MEDICAL & SURGICAL HISTORY:    Diabetes  Dyslipidemia  Hypertension  Heart failure  Diverticulitis  S/P partial resection of colon    FAMILY HISTORY:    Mother:  at 30 of heart disease  Father:  at 60 of kidney    SOCIAL HISTORY:      Former smoker, history of alcohol and drug use quit 17 years ago    Allergies    ACE inhibitors (Angioedema)    Intolerances        PHYSICAL EXAM:    General Appearance: well appearing, normal for age and gender. 	  Neck: normal JVP, no bruit.   Eyes:  Extra Ocular muscles intact.   Cardiovascular: regular rate and rhythm S1 S2, unable to assess JVD, No murmurs, + BLE edema  Respiratory: Lungs clear to auscultation	  Psychiatry: Alert and oriented x 3, Mood & affect appropriate  Gastrointestinal:  Soft, Non-tender  Skin/Integumen: No rashes, No ecchymoses, No cyanosis	  Neurologic: Non-focal  Musculoskeletal/ extremities: Normal range of motion, No clubbing, cyanosis, + BLE edema  Vascular: Peripheral pulses palpable 2+ bilaterally  	  PREVIOUS DIAGNOSTIC TESTING:          Home Medications:  Aspir 81 oral delayed release tablet: 1 tab(s) orally once a day (19 Mar 2021 16:01)  atorvastatin 80 mg oral tablet: 1 tab(s) orally once a day (19 Mar 2021 16:01)  Entresto 97 mg-103 mg oral tablet: 1 tab(s) orally 2 times a day (19 Mar 2021 16:01)  ezetimibe 10 mg oral tablet: 1 tab(s) orally once a day (19 Mar 2021 16:01)  Lasix 20 mg oral tablet: 1 tab(s) orally 2 times a week (19 Mar 2021 16:01)  metoprolol succinate 25 mg oral tablet, extended release: 1 tab(s) orally once a day (19 Mar 2021 16:01)  Vitamin C 1000 mg oral tablet: 1 tab(s) orally once a day (19 Mar 2021 16:01)  Vitamin D3 5000 intl units oral tablet: 1 tab(s) orally once a day (19 Mar 2021 16:01)     Date of Admission: 3/19/21    Interval History: Patient found sitting in chair in NAD, he remains fluid overloaded.             HISTORY OF PRESENT ILLNESS:     71 yo M with PMHx of CKD (Unknown baseline), HFrEF, HTN, HLD, DM, LGIB secondary to Diverticulitis s/p colon resection (at [facility] about 6.5 years ago) and CAD presenting today with SOB. Patient states he has had progressive SOB that has worsened in the last 2 weeks. Patient states he developed chills, productive cough, orthopnea, diarrhea, and diffuse abdominal discomfort x 6 weeks. Denies c/p, palpitations, LH/dizziness, LOC.       PAST MEDICAL & SURGICAL HISTORY:    Diabetes  Dyslipidemia  Hypertension  Heart failure  Diverticulitis  S/P partial resection of colon    FAMILY HISTORY:    Mother:  at 30 of heart disease  Father:  at 60 of kidney    SOCIAL HISTORY:      Former smoker, history of alcohol and drug use quit 17 years ago    Allergies    ACE inhibitors (Angioedema)    Intolerances        PHYSICAL EXAM:    General Appearance: well appearing, normal for age and gender. 	  Neck: normal JVP, no bruit.   Eyes:  Extra Ocular muscles intact.   Cardiovascular: regular rate and rhythm S1 S2, unable to assess JVD, No murmurs, + BLE edema  Respiratory: Lungs clear to auscultation	  Psychiatry: Alert and oriented x 3, Mood & affect appropriate  Gastrointestinal:  Soft, Non-tender  Skin/Integumen: No rashes, No ecchymoses, No cyanosis	  Neurologic: Non-focal  Musculoskeletal/ extremities: Normal range of motion, No clubbing, cyanosis, + BLE edema  Vascular: Peripheral pulses palpable 2+ bilaterally  	  PREVIOUS DIAGNOSTIC TESTING:          Home Medications:  Aspir 81 oral delayed release tablet: 1 tab(s) orally once a day (19 Mar 2021 16:01)  atorvastatin 80 mg oral tablet: 1 tab(s) orally once a day (19 Mar 2021 16:01)  Entresto 97 mg-103 mg oral tablet: 1 tab(s) orally 2 times a day (19 Mar 2021 16:01)  ezetimibe 10 mg oral tablet: 1 tab(s) orally once a day (19 Mar 2021 16:01)  Lasix 20 mg oral tablet: 1 tab(s) orally 2 times a week (19 Mar 2021 16:01)  metoprolol succinate 25 mg oral tablet, extended release: 1 tab(s) orally once a day (19 Mar 2021 16:01)  Vitamin C 1000 mg oral tablet: 1 tab(s) orally once a day (19 Mar 2021 16:01)  Vitamin D3 5000 intl units oral tablet: 1 tab(s) orally once a day (19 Mar 2021 16:01)

## 2021-03-30 NOTE — PROGRESS NOTE ADULT - ASSESSMENT
71 y/o man with PMH of CKD 3, HFrEF, HTN, DM, CAD and morbid obesity (BMI 42.3) presented  with few days of shortness of breath and he was sent to the ER by his nephrologist.      1. Acute Hypoxemic Respiratory Failure due to COVID 19 with severe illness  - s/p remdesivir x 1 dose and then stopped due to SAGE  - on decadron  - s/p course of abx (had elevated procalcitonin initially)  - COVID Antibody positive, no indication for plasma  - remains on HFNC - titrate O2 as tolerated  - CTA of chest negative for PE, VA duplex negative for DVT  - repeat inflammatory markers on 3/29 reviewed  - hypoxemia also due to pt being volume overloaded    2. Acute over chronic HFrEF - unknown EF  - need ECHO  - heart failure consult appreciated  - diuresis with bumex 2mg IV q12hr  - continue bblocker  - Entresto held due to SAGE and hyperkalemia  - fluid restriction discussed with the pt  - I's and O's, daily wts, low sodium diet  - continue tele  - iron studies    3. SAGE over CKD 3 - overall improved  due to contrast vs COVID  renal f/u appreciated  avoid nephrotoxic agents    4. Hyperkalemia - low K diet, continue lokelma and monitor    5. DM - FS acceptable with diet control for now    6. HTN on metoprolol    7. CAD - on ASA/statin/metoprolol    8. DVT prophylaxis - heparin SQ   9. Transaminitis - could be from COVID and volume overload/hepatic congestion - trend    full code status      PROGRESS NOTE HANDOFF    Pending: titrate O2, diuresis, ECHO, iron studies, daily CBC and CMP    Disposition: from home

## 2021-03-30 NOTE — PROGRESS NOTE ADULT - ASSESSMENT
Acute severe COVID pneumonia  Acute hypoxemic respiratory failure  Acute on CKD (baseline serum creatinine 1.5 mg/dL), COVID vs. contrast as reason for acute kidney injury  Systolic CHF - usually NYHA Class 2-3 now Class 4    Suggest:    Minimize fluid intake  Continue Lokelma   Monitor on high dose Bumex  Monitor I/O  Trend BMP

## 2021-03-30 NOTE — PROGRESS NOTE ADULT - SUBJECTIVE AND OBJECTIVE BOX
HAMMAD KING  70y Male    INTERVAL HPI/OVERNIGHT EVENTS:    Pt sitting in a chair - frustrated over the long hospital course.   He states he is now compliant with fluid restriction  He remains on HFNC  + cough     T(F): 97.7 (03-30-21 @ 04:57), Max: 97.7 (03-30-21 @ 04:57)  HR: 84 (03-30-21 @ 05:26) (63 - 84)  BP: 142/81 (03-30-21 @ 04:57) (105/77 - 142/81)  RR: 18 (03-30-21 @ 04:57) (18 - 18)  SpO2: 97% (03-30-21 @ 07:48) (91% - 97%) on HFNC 50L/80%    I&O's Summary    29 Mar 2021 07:01  -  30 Mar 2021 07:00  --------------------------------------------------------  IN: 690 mL / OUT: 3250 mL / NET: -2560 mL    30 Mar 2021 07:01  -  30 Mar 2021 10:40  --------------------------------------------------------  IN: 437 mL / OUT: 1000 mL / NET: -563 mL        CAPILLARY BLOOD GLUCOSE      POCT Blood Glucose.: 118 mg/dL (30 Mar 2021 07:40)  POCT Blood Glucose.: 203 mg/dL (29 Mar 2021 21:12)  POCT Blood Glucose.: 143 mg/dL (29 Mar 2021 16:50)  POCT Blood Glucose.: 208 mg/dL (29 Mar 2021 12:11)        PHYSICAL EXAM:  GENERAL: NAD in a chair  HEAD:  Normocephalic  EYES:  conjunctiva and sclera clear  ENMT: Moist mucous membranes  NECK: Supple  NERVOUS SYSTEM:  Alert & Oriented X3, Good concentration  CHEST/LUNG: decreased BS b/l  HEART: Regular rate and rhythm  ABDOMEN: Soft, Nontender, Nondistended  EXTREMITIES:  ++ edema of LE b/l    Consultant(s) Notes Reviewed:  [x ] YES  [ ] NO  Care Discussed with Consultants/Other Providers [ x] YES  [ ] NO    MEDICATIONS  (STANDING):  aspirin enteric coated 81 milliGRAM(s) Oral daily  atorvastatin 80 milliGRAM(s) Oral at bedtime  buMETAnide Injectable 2 milliGRAM(s) IV Push every 12 hours  chlorhexidine 4% Liquid 1 Application(s) Topical daily  dexAMETHasone  Injectable 6 milliGRAM(s) IV Push daily  dextrose 40% Gel 15 Gram(s) Oral once  dextrose 5%. 1000 milliLiter(s) (50 mL/Hr) IV Continuous <Continuous>  dextrose 5%. 1000 milliLiter(s) (100 mL/Hr) IV Continuous <Continuous>  dextrose 50% Injectable 12.5 Gram(s) IV Push once  dextrose 50% Injectable 25 Gram(s) IV Push once  dextrose 50% Injectable 25 Gram(s) IV Push once  glucagon  Injectable 1 milliGRAM(s) IntraMuscular once  heparin   Injectable 5000 Unit(s) SubCutaneous every 8 hours  influenza   Vaccine 0.5 milliLiter(s) IntraMuscular once  insulin lispro (ADMELOG) corrective regimen sliding scale   SubCutaneous three times a day before meals  melatonin 5 milliGRAM(s) Oral at bedtime  metoprolol succinate ER 25 milliGRAM(s) Oral daily  polyethylene glycol 3350 17 Gram(s) Oral every 12 hours  senna 2 Tablet(s) Oral at bedtime  sodium zirconium cyclosilicate 10 Gram(s) Oral daily  zolpidem 5 milliGRAM(s) Oral at bedtime    MEDICATIONS  (PRN):  acetaminophen   Tablet .. 650 milliGRAM(s) Oral every 8 hours PRN Mild Pain (1 - 3)  guaifenesin/dextromethorphan  Syrup 5 milliLiter(s) Oral every 6 hours PRN Cough    EKG reviewed  Telemetry reviewed    LABS:                        12.4   10.19 )-----------( 183      ( 30 Mar 2021 04:30 )             36.7     03-30    140  |  106  |  79<HH>  ----------------------------<  117<H>  5.3<H>   |  23  |  1.6<H>    Ca    9.0      30 Mar 2021 04:30  Mg     2.1     03-30    TPro  6.3  /  Alb  3.5  /  TBili  0.9  /  DBili  x   /  AST  62<H>  /  ALT  101<H>  /  AlkPhos  83  03-30    PT/INR - ( 29 Mar 2021 09:18 )   PT: 11.50 sec;   INR: 1.00 ratio                   RADIOLOGY & ADDITIONAL TESTS:    Imaging or report Personally Reviewed:  [ ] YES  [ ] NO    < from: Xray Chest 1 View- PORTABLE-Routine (Xray Chest 1 View- PORTABLE-Routine in AM.) (03.30.21 @ 06:56) >  Impression:    Bilateral airspace opacities, stable.    < end of copied text >      Case discussed with resident and RN on rounds today    Care discussed with pt

## 2021-03-30 NOTE — PROGRESS NOTE ADULT - ASSESSMENT
70 year old male patient with CKD, HFrEF, HTN, DM, CAD, and DM who presented on 03/19 with few days of shortness of breath, found to have COVID pneumonia, admitted for hypoxic RF secondary to COVID pneumonia.     # Acute Hypoxemic Respiratory Failure Secondary to COVID Pneumonia  - Afebrile, Lymphopenia, Transaminitis, On HFNC - attempt to titrate down as tolerated  - CT angio chest: Negative for PE, VA duplex negative for DVT  - COVID Antibody positive, no indications for plasma  - s/p 1 dose of RDV, stopped secondary to low GFR  - s/p course of azithro and ceftriaxone  - c/w dexa  - repeat inflammatory markers noted - decreased overall    # HFrEF - volume overloaded  * Home meds: Lasix 20mg 2x/week, Entresto 97,103mg BID, Toprol 25mg QD  - Pro-BNP 2718  - change diet w/ fluid restriction  - strict I's and O's, Daily weights  - HF specialist consult appreciated   - Echo pending   - Started bumex 2mg IV BID    # SAGE on CKD - improved   * Baseline Cr 1.5, peaked at 3.5 now downtrending back to baseline  - nephro following  - CT a/p negative for hydro  - no need for dialysis    HTN  * Home meds: Entresto 97, 103mg BID, Lasix 20mg 2x/week, Toprol 25mg QD  - will restart Entresto when more stable (K elevated today)  - Monitor BP   - Continue Toprol 25mg QD    # DM II  - A1C: 6.9  - if FS >180 start basal - bolus coverage     # CAD//HLD  - c/w ASA, statin, BB      # DVT ppx - Heparin SQ   # GI ppx - PPI   # Diet - DASH/CC fluid restriction  # COVID test - Positive   Full code.

## 2021-03-30 NOTE — PROGRESS NOTE ADULT - ASSESSMENT
69 yo M with PMHx of CKD (Unknown baseline), HFrEF, HTN, CAD, HLD, DM, LGIB secondary to Diverticulitis s/p colon resection.     Acute on chronic HFrEF/ Respiratory failure on HFNC/ SAGE / transaminitis       Bumex 2 mg iv BID  Continue Metoprolol ER 25 mg Daily  Get BMP twice daily    Maintain potassium >4.0, Mg >2.1  Strict intake and output  Daily weight   Obtain Iron profile  Further recommendations pending TTE  Will continue to follow  71 yo M with PMHx of CKD (Unknown baseline), HFrEF, HTN, CAD, HLD, DM, LGIB secondary to Diverticulitis s/p colon resection.         Acute on chronic HFrEF/ Respiratory failure on HFNC/ SAGE / transaminitis     Patient remains overloaded on exam and respiratory failure requiring HFNC  Continue Bumex 2 mg iv BID  Start 3% hypertonic saline 150 ml every 12 hrs  Continue Metoprolol ER 25 mg Daily  Get BMP twice daily    Maintain potassium >4.0, Mg >2.1  Strict intake and output  Daily weight   Obtain Iron profile  Further recommendations pending TTE  Will continue to follow

## 2021-03-31 NOTE — PROGRESS NOTE ADULT - ASSESSMENT
Acute severe COVID pneumonia  Acute hypoxemic respiratory failure  Acute on CKD (baseline serum creatinine 1.5 mg/dL), COVID vs. contrast as reason for acute kidney injury  Systolic CHF - usually NYHA Class 2-3 now Class 4    Suggest:    Minimize fluid intake  Continue Lokelma   Monitor kidney function on high dose Bumex  Monitor I/O  Trend BMP

## 2021-03-31 NOTE — PROGRESS NOTE ADULT - SUBJECTIVE AND OBJECTIVE BOX
[facility]PHROLOGY FOLLOW UP NOTE  --------------------------------------------------------------------------------  24 hour events/subjective: Patient examined. Appears comfortable.    PAST HISTORY  --------------------------------------------------------------------------------  No significant changes to PMH, PSH, FHx, SHx, unless otherwise noted    ALLERGIES & MEDICATIONS  --------------------------------------------------------------------------------  Allergies    ACE inhibitors (Angioedema)    Standing Inpatient Medications  aspirin enteric coated 81 milliGRAM(s) Oral daily  atorvastatin 80 milliGRAM(s) Oral at bedtime  buMETAnide Injectable 2 milliGRAM(s) IV Push every 12 hours  chlorhexidine 4% Liquid 1 Application(s) Topical daily  dexAMETHasone  Injectable 6 milliGRAM(s) IV Push daily  dextrose 40% Gel 15 Gram(s) Oral once  dextrose 5%. 1000 milliLiter(s) IV Continuous <Continuous>  dextrose 5%. 1000 milliLiter(s) IV Continuous <Continuous>  dextrose 50% Injectable 25 Gram(s) IV Push once  dextrose 50% Injectable 12.5 Gram(s) IV Push once  dextrose 50% Injectable 25 Gram(s) IV Push once  glucagon  Injectable 1 milliGRAM(s) IntraMuscular once  heparin   Injectable 5000 Unit(s) SubCutaneous every 8 hours  influenza   Vaccine 0.5 milliLiter(s) IntraMuscular once  insulin lispro (ADMELOG) corrective regimen sliding scale   SubCutaneous three times a day before meals  melatonin 5 milliGRAM(s) Oral at bedtime  metoprolol succinate ER 25 milliGRAM(s) Oral daily  polyethylene glycol 3350 17 Gram(s) Oral every 12 hours  senna 2 Tablet(s) Oral at bedtime  sodium zirconium cyclosilicate 10 Gram(s) Oral daily  zolpidem 5 milliGRAM(s) Oral at bedtime    PRN Inpatient Medications  acetaminophen   Tablet .. 650 milliGRAM(s) Oral every 8 hours PRN  guaifenesin/dextromethorphan  Syrup 5 milliLiter(s) Oral every 6 hours PRN      VITALS/PHYSICAL EXAM  --------------------------------------------------------------------------------  T(C): 35.8 (03-31-21 @ 05:00), Max: 35.8 (03-30-21 @ 13:33)  HR: 80 (03-31-21 @ 05:00) (78 - 80)  BP: 107/73 (03-31-21 @ 05:00) (103/67 - 124/75)  RR: 18 (03-31-21 @ 05:00) (18 - 19)  SpO2: 94% (03-31-21 @ 04:31) (93% - 99%)  Wt(kg): --        03-30-21 @ 07:01  -  03-31-21 @ 07:00  --------------------------------------------------------  IN: 917 mL / OUT: 3325 mL / NET: -2408 mL    03-31-21 @ 07:01  -  03-31-21 @ 12:12  --------------------------------------------------------  IN: 0 mL / OUT: 900 mL / NET: -900 mL      Physical Exam:  	Gen: NAD  	Pulm:  B/L rales  	CV: RRR, S1S2  	Abd: +BS, soft, nontender/nondistended  	: No suprapubic tenderness  	LE: Warm,  edema    LABS/STUDIES  --------------------------------------------------------------------------------              11.9   8.69  >-----------<  168      [03-31-21 @ 07:19]              35.9     140  |  106  |  85  ----------------------------<  114      [03-31-21 @ 07:19]  5.2   |  23  |  1.6        Ca     9.1     [03-31-21 @ 07:19]      Mg     2.0     [03-31-21 @ 07:19]    TPro  6.0  /  Alb  3.4  /  TBili  0.8  /  DBili  x   /  AST  63  /  ALT  108  /  AlkPhos  81  [03-31-21 @ 07:19]    Creatinine Trend:  SCr 1.6 [03-31 @ 07:19]  SCr 1.6 [03-30 @ 16:00]  SCr 1.6 [03-30 @ 04:30]  SCr 1.4 [03-29 @ 09:18]  SCr 1.6 [03-28 @ 07:09]    Urinalysis - [03-23-21 @ 09:35]      Color Yellow / Appearance Clear / SG 1.016 / pH 6.0      Gluc Negative / Ketone Negative  / Bili Negative / Urobili <2 mg/dL       Blood Small / Protein 300 mg/dL / Leuk Est Negative / Nitrite Negative      RBC 3 / WBC 3 / Hyaline 3 / Gran  / Sq Epi  / Non Sq Epi 1 / Bacteria Negative      Ferritin 1381      [03-29-21 @ 16:00]  HbA1c 6.1      [11-23-19 @ 08:02]  TSH 0.46      [03-20-21 @ 04:30]  Lipid: chol 87, TG 64, HDL 41, LDL --      [03-20-21 @ 04:30]

## 2021-03-31 NOTE — PROGRESS NOTE ADULT - ASSESSMENT
71 y/o man with PMH of CKD 3, HFrEF, HTN, DM, CAD and morbid obesity (BMI 42.3) presented  with few days of shortness of breath and he was sent to the ER by his nephrologist.      1. Acute Hypoxemic Respiratory Failure due to COVID 19 with severe illness  - s/p remdesivir x 1 dose and then stopped due to SAGE  - on decadron  - s/p course of abx (had elevated procalcitonin initially)  - COVID Antibody positive, no indication for plasma  - remains on HFNC - titrate O2 as tolerated  - CTA of chest negative for PE, VA duplex negative for DVT  - repeat inflammatory markers on 3/29 reviewed  - hypoxemia also due to pt being volume overloaded    2. Acute over chronic HFrEF - unknown EF  - ECHO approved by Dr. Garza today  - heart failure consult appreciated  - diuresis with bumex 2mg IV q12hr and give 3% hypertonic saline q12hr  - continue bblocker  - Entresto held due to SAGE and hyperkalemia  - fluid restriction discussed with the pt  - I's and O's, daily wts, low sodium diet  - continue tele  - check iron studies    3. SAGE over CKD 3 - overall improved  due to contrast vs COVID  renal f/u appreciated  avoid nephrotoxic agents    4. Hyperkalemia - low K diet, continue lokelma and monitor    5. DM - FS acceptable with diet control for now    6. HTN on metoprolol    7. CAD - on ASA/statin/metoprolol    8. DVT prophylaxis - heparin SQ     9. Transaminitis - could be from COVID and volume overload/hepatic congestion - trend    full code status      PROGRESS NOTE HANDOFF    Pending: titrate O2, diuresis, ECHO, iron studies, daily CBC and CMP    Disposition: from home

## 2021-03-31 NOTE — PROGRESS NOTE ADULT - ASSESSMENT
70 year old male patient with CKD, HFrEF, HTN, DM, CAD, and DM who presented on 03/19 with few days of shortness of breath, found to have COVID pneumonia, admitted for hypoxic RF secondary to COVID pneumonia.     # Acute Hypoxemic Respiratory Failure Secondary to COVID Pneumonia  - Afebrile, Lymphopenia, Transaminitis, On HFNC - attempt to titrate down as tolerated  - CT angio chest: Negative for PE, VA duplex negative for DVT  - COVID Antibody positive, no indications for plasma  - s/p 1 dose of RDV, stopped secondary to low GFR  - s/p course of azithro and ceftriaxone  - c/w dexa  - repeat inflammatory markers noted - decreased overall    # HFrEF - volume overloaded  * Home meds: Lasix 20mg 2x/week, Entresto 97,103mg BID, Toprol 25mg QD  - Pro-BNP 2718  - change diet w/ fluid restriction  - strict I's and O's, Daily weights  - HF specialist consult appreciated   - Echo pending- called echo lab to make sure it happens today   - Started bumex 2mg IV BID    # SAGE on CKD - improved   * Baseline Cr 1.5, peaked at 3.5 now downtrending back to baseline  - nephro following  - CT a/p negative for hydro  - no need for dialysis    HTN  * Home meds: Entresto 97, 103mg BID, Lasix 20mg 2x/week, Toprol 25mg QD  - will restart Entresto when more stable (K elevated today)  - Monitor BP   - Continue Toprol 25mg QD    # DM II  - A1C: 6.9  - if FS >180 start basal - bolus coverage     # CAD//HLD  - c/w ASA, statin, BB      # DVT ppx - Heparin SQ   # GI ppx - PPI   # Diet - DASH/CC fluid restriction  # COVID test - Positive   Full code.    70 year old male patient with CKD, HFrEF, HTN, DM, CAD, and DM who presented on 03/19 with few days of shortness of breath, found to have COVID pneumonia, admitted for hypoxic RF secondary to COVID pneumonia.     # Acute Hypoxemic Respiratory Failure Secondary to COVID Pneumonia  - Afebrile, Lymphopenia, Transaminitis, On HFNC - attempt to titrate down as tolerated  - CT angio chest: Negative for PE, VA duplex negative for DVT  - COVID Antibody positive, no indications for plasma  - s/p 1 dose of RDV, stopped secondary to low GFR  - s/p course of azithro and ceftriaxone  - c/w dexa  - repeat inflammatory markers noted - decreased overall    # HFrEF - volume overloaded  * Home meds: Lasix 20mg 2x/week, Entresto 97,103mg BID, Toprol 25mg QD  - Pro-BNP 2718  - change diet w/ fluid restriction  - strict I's and O's, Daily weights  - HF specialist consult appreciated   - Echo pending- called echo lab to make sure it happens today   - Started bumex 2mg IV BID  - Will add hypertonic saline as per Dr. Locke    # SAGE on CKD - improved   * Baseline Cr 1.5, peaked at 3.5 now downtrending back to baseline  - nephro following  - CT a/p negative for hydro  - no need for dialysis    HTN  * Home meds: Entresto 97, 103mg BID, Lasix 20mg 2x/week, Toprol 25mg QD  - will restart Entresto when more stable (K elevated today)  - Monitor BP   - Continue Toprol 25mg QD    # DM II  - A1C: 6.9  - if FS >180 start basal - bolus coverage     # CAD//HLD  - c/w ASA, statin, BB      # DVT ppx - Heparin SQ   # GI ppx - PPI   # Diet - DASH/CC fluid restriction  # COVID test - Positive   Full code.

## 2021-03-31 NOTE — PROGRESS NOTE ADULT - SUBJECTIVE AND OBJECTIVE BOX
HAMMAD KING  70y Male    INTERVAL HPI/OVERNIGHT EVENTS:    Pt feels the same - remains on HFNC 50L/86%    T(F): 96.5 (03-31-21 @ 05:00), Max: 96.5 (03-30-21 @ 13:33)  HR: 80 (03-31-21 @ 05:00) (78 - 80)  BP: 107/73 (03-31-21 @ 05:00) (103/67 - 124/75)  RR: 18 (03-31-21 @ 05:00) (18 - 19)  SpO2: 94% (03-31-21 @ 04:31) (93% - 99%)    I&O's Summary    30 Mar 2021 07:01  -  31 Mar 2021 07:00  --------------------------------------------------------  IN: 917 mL / OUT: 3325 mL / NET: -2408 mL    31 Mar 2021 07:01  -  31 Mar 2021 12:42  --------------------------------------------------------  IN: 0 mL / OUT: 900 mL / NET: -900 mL        CAPILLARY BLOOD GLUCOSE      POCT Blood Glucose.: 141 mg/dL (31 Mar 2021 08:12)  POCT Blood Glucose.: 131 mg/dL (30 Mar 2021 20:48)  POCT Blood Glucose.: 144 mg/dL (30 Mar 2021 16:49)        PHYSICAL EXAM:  GENERAL: NAD in a chair  HEAD:  Normocephalic  EYES:  conjunctiva and sclera clear  ENMT: Moist mucous membranes  NECK: Supple,   NERVOUS SYSTEM:  Alert & Oriented X3, Good concentration  CHEST/LUNG: crackles at bases  HEART: Regular rate and rhythm  ABDOMEN: Soft, Nontender, distended  EXTREMITIES:  ++ LE edema      Consultant(s) Notes Reviewed:  [x ] YES  [ ] NO  Care Discussed with Consultants/Other Providers [ x] YES  [ ] NO    MEDICATIONS  (STANDING):  aspirin enteric coated 81 milliGRAM(s) Oral daily  atorvastatin 80 milliGRAM(s) Oral at bedtime  buMETAnide Injectable 2 milliGRAM(s) IV Push every 12 hours  chlorhexidine 4% Liquid 1 Application(s) Topical daily  dexAMETHasone  Injectable 6 milliGRAM(s) IV Push daily  dextrose 40% Gel 15 Gram(s) Oral once  dextrose 5%. 1000 milliLiter(s) (50 mL/Hr) IV Continuous <Continuous>  dextrose 5%. 1000 milliLiter(s) (100 mL/Hr) IV Continuous <Continuous>  dextrose 50% Injectable 25 Gram(s) IV Push once  dextrose 50% Injectable 12.5 Gram(s) IV Push once  dextrose 50% Injectable 25 Gram(s) IV Push once  glucagon  Injectable 1 milliGRAM(s) IntraMuscular once  heparin   Injectable 5000 Unit(s) SubCutaneous every 8 hours  influenza   Vaccine 0.5 milliLiter(s) IntraMuscular once  insulin lispro (ADMELOG) corrective regimen sliding scale   SubCutaneous three times a day before meals  melatonin 5 milliGRAM(s) Oral at bedtime  metoprolol succinate ER 25 milliGRAM(s) Oral daily  polyethylene glycol 3350 17 Gram(s) Oral every 12 hours  senna 2 Tablet(s) Oral at bedtime  sodium zirconium cyclosilicate 10 Gram(s) Oral daily  zolpidem 5 milliGRAM(s) Oral at bedtime    MEDICATIONS  (PRN):  acetaminophen   Tablet .. 650 milliGRAM(s) Oral every 8 hours PRN Mild Pain (1 - 3)  guaifenesin/dextromethorphan  Syrup 5 milliLiter(s) Oral every 6 hours PRN Cough    Telemetry reviewed    LABS:                        11.9   8.69  )-----------( 168      ( 31 Mar 2021 07:19 )             35.9     03-31    140  |  106  |  85<HH>  ----------------------------<  114<H>  5.2<H>   |  23  |  1.6<H>    Ca    9.1      31 Mar 2021 07:19  Mg     2.0     03-31    TPro  6.0  /  Alb  3.4<L>  /  TBili  0.8  /  DBili  x   /  AST  63<H>  /  ALT  108<H>  /  AlkPhos  81  03-31                Case discussed with resident and RN on rounds today    Care discussed with pt

## 2021-03-31 NOTE — PROGRESS NOTE ADULT - SUBJECTIVE AND OBJECTIVE BOX
SUBJECTIVE:    Patient is a 70y old Male who presents with a chief complaint of SOB (30 Mar 2021 12:12)    Currently admitted to medicine with the primary diagnosis of Acute hypoxemic respiratory failure due to COVID-19/CHF     Today is hospital day 12d. Patient was seen and examined at bedside. This morning he is seen resting in his chair. No real clinical change from yesterday. Output remains strong.     PAST MEDICAL & SURGICAL HISTORY  PAST MEDICAL & SURGICAL HISTORY:  Diverticulitis    Heart failure    Hypertension    Dyslipidemia    Diabetes    S/P partial resection of colon    ALLERGIES:  ACE inhibitors (Angioedema)    MEDICATIONS:  STANDING MEDICATIONS  aspirin enteric coated 81 milliGRAM(s) Oral daily  atorvastatin 80 milliGRAM(s) Oral at bedtime  buMETAnide Injectable 2 milliGRAM(s) IV Push every 12 hours  chlorhexidine 4% Liquid 1 Application(s) Topical daily  dexAMETHasone  Injectable 6 milliGRAM(s) IV Push daily  dextrose 40% Gel 15 Gram(s) Oral once  dextrose 5%. 1000 milliLiter(s) IV Continuous <Continuous>  dextrose 5%. 1000 milliLiter(s) IV Continuous <Continuous>  dextrose 50% Injectable 25 Gram(s) IV Push once  dextrose 50% Injectable 12.5 Gram(s) IV Push once  dextrose 50% Injectable 25 Gram(s) IV Push once  glucagon  Injectable 1 milliGRAM(s) IntraMuscular once  heparin   Injectable 5000 Unit(s) SubCutaneous every 8 hours  influenza   Vaccine 0.5 milliLiter(s) IntraMuscular once  insulin lispro (ADMELOG) corrective regimen sliding scale   SubCutaneous three times a day before meals  melatonin 5 milliGRAM(s) Oral at bedtime  metoprolol succinate ER 25 milliGRAM(s) Oral daily  polyethylene glycol 3350 17 Gram(s) Oral every 12 hours  senna 2 Tablet(s) Oral at bedtime  sodium zirconium cyclosilicate 10 Gram(s) Oral daily  zolpidem 5 milliGRAM(s) Oral at bedtime    PRN MEDICATIONS  acetaminophen   Tablet .. 650 milliGRAM(s) Oral every 8 hours PRN  guaifenesin/dextromethorphan  Syrup 5 milliLiter(s) Oral every 6 hours PRN    VITALS:   T(F): 96.5  HR: 80  BP: 107/73  RR: 18  SpO2: 94%    LABS:                        11.9   8.69  )-----------( 168      ( 31 Mar 2021 07:19 )             35.9     03-31    140  |  106  |  85<HH>  ----------------------------<  114<H>  5.2<H>   |  23  |  1.6<H>    Ca    9.1      31 Mar 2021 07:19  Mg     2.0     03-31    TPro  6.0  /  Alb  3.4<L>  /  TBili  0.8  /  DBili  x   /  AST  63<H>  /  ALT  108<H>  /  AlkPhos  81  03-31    RADIOLOGY:  3/30 CXR -   Impression:    Bilateral airspace opacities, stable.    PHYSICAL EXAM:  GENERAL: NAD, speaks in full sentences. Mild respiratory distress  HEAD: Atraumatic  NECK: Supple  CHEST/LUNG: Slight crackles bilaterally   HEART: S1, S2; RRR; No murmurs, rubs, or gallops  ABDOMEN: BS+; Soft, Non-tender, Non-distended  EXTREMITIES:  2+ Peripheral Pulses. Prominent LE edema   PSYCH: AAOx3  NEUROLOGY: non-focal  SKIN: No rashes or lesions

## 2021-04-01 NOTE — PROGRESS NOTE ADULT - ASSESSMENT
IMPRESSION:    Acute hypoxemic respiratory failure improving  Severe COVID pneumonia; improving inflammatory markers  HFmrEF  Probable SAIDA   SAGE on CKD; resolved now at baseline    PLAN:    CNS: NO depressants     HEENT: Oral care    PULMONARY:  HOB @ 45 degrees.  Aspiration precautions.  Wean O2.  May start BIPAP 12/6 at bedtime. HFNC during day    CARDIOVASCULAR:  Avoid volume overload . diuresis as tolerated as per cardiology    GI: GI prophylaxis.  Feeding.  Bowel regimen     RENAL:  Follow up lytes.  Correct as needed. lokelma. fu renal    INFECTIOUS DISEASE: repeat procal. completed antibx      HEMATOLOGICAL:  DVT prophylaxis.      ENDOCRINE:  Follow up FS.     MUSCULOSKELETAL:  Bed Chair position

## 2021-04-01 NOTE — PROGRESS NOTE ADULT - SUBJECTIVE AND OBJECTIVE BOX
HAMMAD KING  70y Male    INTERVAL HPI/OVERNIGHT EVENTS:    Pt willing to use bipap at night  No new complaints.  remains on HFNC 50/85% - pulse ox now 96%    T(F): 97.7 (04-01-21 @ 08:01), Max: 97.8 (03-31-21 @ 21:43)  HR: 90 (04-01-21 @ 08:01) (85 - 90)  BP: 93/58 (04-01-21 @ 08:01) (84/70 - 128/81)  RR: 18 (04-01-21 @ 08:01) (18 - 18)  SpO2: 92% (04-01-21 @ 08:30) (92% - 94%)    I&O's Summary    31 Mar 2021 07:01  -  01 Apr 2021 07:00  --------------------------------------------------------  IN: 0 mL / OUT: 4300 mL / NET: -4300 mL    01 Apr 2021 07:01  -  01 Apr 2021 11:30  --------------------------------------------------------  IN: 0 mL / OUT: 300 mL / NET: -300 mL      CAPILLARY BLOOD GLUCOSE      POCT Blood Glucose.: 242 mg/dL (01 Apr 2021 11:25)  POCT Blood Glucose.: 100 mg/dL (01 Apr 2021 07:40)  POCT Blood Glucose.: 134 mg/dL (31 Mar 2021 21:23)  POCT Blood Glucose.: 159 mg/dL (31 Mar 2021 16:57)        PHYSICAL EXAM:  GENERAL: NAD  HEAD:  Normocephalic  EYES:  conjunctiva and sclera clear  ENMT: Moist mucous membranes  NECK: Supple  NERVOUS SYSTEM:  Alert & Oriented X3, Good concentration  CHEST/LUNG: Crackles at bases and decreased BS b/l  HEART: Regular rate and rhythm  ABDOMEN: Soft, Nontender, Nondistended  EXTREMITIES:  improving LE edema    Consultant(s) Notes Reviewed:  [x ] YES  [ ] NO  Care Discussed with Consultants/Other Providers [ x] YES  [ ] NO    MEDICATIONS  (STANDING):  aspirin enteric coated 81 milliGRAM(s) Oral daily  atorvastatin 80 milliGRAM(s) Oral at bedtime  buMETAnide Injectable 2 milliGRAM(s) IV Push every 12 hours  chlorhexidine 4% Liquid 1 Application(s) Topical daily  dexAMETHasone  Injectable 6 milliGRAM(s) IV Push daily  dextrose 40% Gel 15 Gram(s) Oral once  dextrose 5%. 1000 milliLiter(s) (100 mL/Hr) IV Continuous <Continuous>  dextrose 5%. 1000 milliLiter(s) (50 mL/Hr) IV Continuous <Continuous>  dextrose 50% Injectable 25 Gram(s) IV Push once  dextrose 50% Injectable 12.5 Gram(s) IV Push once  dextrose 50% Injectable 25 Gram(s) IV Push once  glucagon  Injectable 1 milliGRAM(s) IntraMuscular once  heparin   Injectable 5000 Unit(s) SubCutaneous every 8 hours  influenza   Vaccine 0.5 milliLiter(s) IntraMuscular once  insulin lispro (ADMELOG) corrective regimen sliding scale   SubCutaneous three times a day before meals  melatonin 5 milliGRAM(s) Oral at bedtime  metoprolol succinate ER 25 milliGRAM(s) Oral daily  polyethylene glycol 3350 17 Gram(s) Oral every 12 hours  senna 2 Tablet(s) Oral at bedtime  sodium chloride 3%. 150 milliLiter(s) (50 mL/Hr) IV Continuous <Continuous>  sodium zirconium cyclosilicate 10 Gram(s) Oral daily  zolpidem 5 milliGRAM(s) Oral at bedtime    MEDICATIONS  (PRN):  acetaminophen   Tablet .. 650 milliGRAM(s) Oral every 8 hours PRN Mild Pain (1 - 3)  guaifenesin/dextromethorphan  Syrup 5 milliLiter(s) Oral every 6 hours PRN Cough    Telemetry reviewed - episode of NSVT    LABS:                        13.2   12.32 )-----------( 143      ( 01 Apr 2021 07:18 )             40.5     04-01    146  |  105  |  85<HH>  ----------------------------<  100<H>  5.3<H>   |  22  |  1.6<H>    Ca    9.3      01 Apr 2021 07:18  Mg     1.9     04-01    TPro  6.6  /  Alb  3.6  /  TBili  1.1  /  DBili  x   /  AST  57<H>  /  ALT  111<H>  /  AlkPhos  115  04-01      Iron with Total Binding Capacity (03.31.21 @ 16:00)   Iron - Total Binding Capacity.: 259 ug/dL   % Saturation, Iron: 22 %   Iron Total, Serum: 58 ug/dL   Unsaturated Iron Binding Capacity: 201 ug/dL     < from: TTE Echo Complete w/o Contrast w/ Doppler (03.31.21 @ 15:09) >    Summary:   1. Left ventricular ejection fraction, by visual estimation, is 50 to 55%.   2. Basal inferolateral segment and basal inferior segment are abnormal as described above.   3. Increased LV wall thickness.   4. Spectral Doppler shows impaired relaxation pattern of left ventricular myocardial filling (Grade I diastolic dysfunction).   5. Normal left atrial size.   6. Mild mitral valve regurgitation.   7. Mild tricuspid regurgitation.    < end of copied text >          Case discussed with resident    Care discussed with pt

## 2021-04-01 NOTE — PROGRESS NOTE ADULT - SUBJECTIVE AND OBJECTIVE BOX
[facility] NEPHROLOGY FOLLOW UP NOTE  --------------------------------------------------------------------------------  24 hour events/subjective: Patient examined. Appears comfortable.    PAST HISTORY  --------------------------------------------------------------------------------  No significant changes to PMH, PSH, FHx, SHx, unless otherwise noted    ALLERGIES & MEDICATIONS  --------------------------------------------------------------------------------  Allergies    ACE inhibitors (Angioedema)    Intolerances      Standing Inpatient Medications  aspirin enteric coated 81 milliGRAM(s) Oral daily  atorvastatin 80 milliGRAM(s) Oral at bedtime  buMETAnide Injectable 2 milliGRAM(s) IV Push every 12 hours  chlorhexidine 4% Liquid 1 Application(s) Topical daily  dexAMETHasone  Injectable 6 milliGRAM(s) IV Push daily  dextrose 40% Gel 15 Gram(s) Oral once  dextrose 5%. 1000 milliLiter(s) IV Continuous <Continuous>  dextrose 5%. 1000 milliLiter(s) IV Continuous <Continuous>  dextrose 50% Injectable 25 Gram(s) IV Push once  dextrose 50% Injectable 25 Gram(s) IV Push once  dextrose 50% Injectable 12.5 Gram(s) IV Push once  glucagon  Injectable 1 milliGRAM(s) IntraMuscular once  heparin   Injectable 5000 Unit(s) SubCutaneous every 8 hours  influenza   Vaccine 0.5 milliLiter(s) IntraMuscular once  insulin lispro (ADMELOG) corrective regimen sliding scale   SubCutaneous three times a day before meals  melatonin 5 milliGRAM(s) Oral at bedtime  metoprolol succinate ER 25 milliGRAM(s) Oral daily  polyethylene glycol 3350 17 Gram(s) Oral every 12 hours  senna 2 Tablet(s) Oral at bedtime  sodium chloride 3%. 150 milliLiter(s) IV Continuous <Continuous>  sodium zirconium cyclosilicate 10 Gram(s) Oral daily  zolpidem 5 milliGRAM(s) Oral at bedtime    PRN Inpatient Medications  acetaminophen   Tablet .. 650 milliGRAM(s) Oral every 8 hours PRN  guaifenesin/dextromethorphan  Syrup 5 milliLiter(s) Oral every 6 hours PRN      VITALS/PHYSICAL EXAM  --------------------------------------------------------------------------------  T(C): 36.5 (04-01-21 @ 08:01), Max: 36.6 (03-31-21 @ 21:43)  HR: 90 (04-01-21 @ 08:01) (85 - 90)  BP: 93/58 (04-01-21 @ 08:01) (84/70 - 128/81)  RR: 18 (04-01-21 @ 08:01) (18 - 18)  SpO2: 92% (04-01-21 @ 08:30) (92% - 94%)    03-31-21 @ 07:01  -  04-01-21 @ 07:00  --------------------------------------------------------  IN: 0 mL / OUT: 4300 mL / NET: -4300 mL    04-01-21 @ 07:01  -  04-01-21 @ 12:25  --------------------------------------------------------  IN: 0 mL / OUT: 300 mL / NET: -300 mL      Physical Exam:  	Gen: NAD  	Pulm: CTA B/L  	CV: RRR, S1S2  	Abd: +BS, soft, nontender/nondistended  	: No suprapubic tenderness  	LE: Warm,  edema    LABS/STUDIES  --------------------------------------------------------------------------------              13.2   12.32 >-----------<  143      [04-01-21 @ 07:18]              40.5     146  |  105  |  85  ----------------------------<  100      [04-01-21 @ 07:18]  5.3   |  22  |  1.6        Ca     9.3     [04-01-21 @ 07:18]      Mg     1.9     [04-01-21 @ 07:18]    TPro  6.6  /  Alb  3.6  /  TBili  1.1  /  DBili  x   /  AST  57  /  ALT  111  /  AlkPhos  115  [04-01-21 @ 07:18]    Creatinine Trend:  SCr 1.6 [04-01 @ 07:18]  SCr 1.8 [03-31 @ 16:00]  SCr 1.6 [03-31 @ 07:19]  SCr 1.6 [03-30 @ 16:00]  SCr 1.6 [03-30 @ 04:30]    Urinalysis - [03-23-21 @ 09:35]      Color Yellow / Appearance Clear / SG 1.016 / pH 6.0      Gluc Negative / Ketone Negative  / Bili Negative / Urobili <2 mg/dL       Blood Small / Protein 300 mg/dL / Leuk Est Negative / Nitrite Negative      RBC 3 / WBC 3 / Hyaline 3 / Gran  / Sq Epi  / Non Sq Epi 1 / Bacteria Negative    Iron 58, TIBC 259, %sat 22      [03-31-21 @ 16:00]  Ferritin 1365      [03-31-21 @ 16:00]  HbA1c 6.1      [11-23-19 @ 08:02]  TSH 0.46      [03-20-21 @ 04:30]  Lipid: chol 87, TG 64, HDL 41, LDL --      [03-20-21 @ 04:30]

## 2021-04-01 NOTE — PROGRESS NOTE ADULT - SUBJECTIVE AND OBJECTIVE BOX
SUBJECTIVE:    Patient is a 70y old Male who presents with a chief complaint of SOB (30 Mar 2021 12:12)    Currently admitted to medicine with the primary diagnosis of Acute hypoxemic respiratory failure due to COVID-19/CHF     Today is hospital day 13d. Patient was seen and examined at bedside. This morning he is seen resting in his chair. No real clinical change from yesterday. Output remains strong. Continues to be frustrated at his overall lack of progress    PAST MEDICAL & SURGICAL HISTORY  PAST MEDICAL & SURGICAL HISTORY:  Diverticulitis    Heart failure    Hypertension    Dyslipidemia    Diabetes    S/P partial resection of colon    ALLERGIES:  ACE inhibitors (Angioedema)    MEDICATIONS:  STANDING MEDICATIONS  aspirin enteric coated 81 milliGRAM(s) Oral daily  atorvastatin 80 milliGRAM(s) Oral at bedtime  buMETAnide Injectable 2 milliGRAM(s) IV Push every 12 hours  chlorhexidine 4% Liquid 1 Application(s) Topical daily  dexAMETHasone  Injectable 6 milliGRAM(s) IV Push daily  dextrose 40% Gel 15 Gram(s) Oral once  dextrose 5%. 1000 milliLiter(s) IV Continuous <Continuous>  dextrose 5%. 1000 milliLiter(s) IV Continuous <Continuous>  dextrose 50% Injectable 25 Gram(s) IV Push once  dextrose 50% Injectable 25 Gram(s) IV Push once  dextrose 50% Injectable 12.5 Gram(s) IV Push once  glucagon  Injectable 1 milliGRAM(s) IntraMuscular once  heparin   Injectable 5000 Unit(s) SubCutaneous every 8 hours  influenza   Vaccine 0.5 milliLiter(s) IntraMuscular once  insulin lispro (ADMELOG) corrective regimen sliding scale   SubCutaneous three times a day before meals  melatonin 5 milliGRAM(s) Oral at bedtime  metoprolol succinate ER 25 milliGRAM(s) Oral daily  polyethylene glycol 3350 17 Gram(s) Oral every 12 hours  senna 2 Tablet(s) Oral at bedtime  sodium chloride 3%. 150 milliLiter(s) IV Continuous <Continuous>  sodium zirconium cyclosilicate 10 Gram(s) Oral daily  zolpidem 5 milliGRAM(s) Oral at bedtime    PRN MEDICATIONS  acetaminophen   Tablet .. 650 milliGRAM(s) Oral every 8 hours PRN  guaifenesin/dextromethorphan  Syrup 5 milliLiter(s) Oral every 6 hours PRN    VITALS:   T(F): 97.7  HR: 90  BP: 93/58  RR: 18  SpO2: 92%    LABS:                        13.2   12.32 )-----------( 143      ( 01 Apr 2021 07:18 )             40.5     04-01    146  |  105  |  85<HH>  ----------------------------<  100<H>  5.3<H>   |  22  |  1.6<H>    Ca    9.3      01 Apr 2021 07:18  Mg     1.9     04-01    TPro  6.6  /  Alb  3.6  /  TBili  1.1  /  DBili  x   /  AST  57<H>  /  ALT  111<H>  /  AlkPhos  115  04-01    PHYSICAL EXAM:  GENERAL: NAD, speaks in full sentences. Mild respiratory distress  HEAD: Atraumatic  NECK: Supple  CHEST/LUNG: Slight crackles bilaterally   HEART: S1, S2; RRR; No murmurs, rubs, or gallops  ABDOMEN: BS+; Soft, Non-tender, Non-distended  EXTREMITIES:  2+ Peripheral Pulses. Prominent LE edema   PSYCH: AAOx3  NEUROLOGY: non-focal  SKIN: No rashes or lesions

## 2021-04-01 NOTE — PROGRESS NOTE ADULT - ASSESSMENT
70 year old male patient with CKD, HFrEF, HTN, DM, CAD, and DM who presented on 03/19 with few days of shortness of breath, found to have COVID pneumonia, admitted for hypoxic RF secondary to COVID pneumonia.     # Acute Hypoxemic Respiratory Failure Secondary to COVID Pneumonia/HF  - Afebrile, Lymphopenia, Transaminitis, On HFNC - attempt to titrate down as tolerated  - CT angio chest: Negative for PE, VA duplex negative for DVT  - COVID Antibody positive, no indications for plasma  - s/p 1 dose of RDV, stopped secondary to low GFR  - s/p course of azithro and ceftriaxone  - c/w dexamethasone   - repeat inflammatory markers noted - decreased overall  - 4/1/21 --> WBC count elevated; will repeat procalcitonin, blood cultures, and CXR  - Pulm consult for BIPAP recommendations    # HFrEF - volume overloaded  * Home meds: Lasix 20mg 2x/week, Entresto 97,103mg BID, Toprol 25mg QD  - Pro-BNP 2718  - change diet w/ fluid restriction  - strict I's and O's, Daily weights  - HF specialist consult appreciated   - Echo results noted  - Started bumex 2mg IV BID  - Will continue hypertonic saline with bumex administrations as per Dr. Locke    # SAGE on CKD - improved   * Baseline Cr 1.5, peaked at 3.5 now downtrending back to baseline  - nephro following  - CT a/p negative for hydro  - no need for dialysis    HTN  * Home meds: Entresto 97, 103mg BID, Lasix 20mg 2x/week, Toprol 25mg QD  - will restart Entresto when more stable (K elevated today)  - Monitor BP   - Continue Toprol 25mg QD    # DM II  - A1C: 6.9  - if FS >180 start basal - bolus coverage     # CAD//HLD  - c/w ASA, statin, BB    # DVT ppx - Heparin SQ   # GI ppx - PPI   # Diet - DASH/CC fluid restriction  # COVID test - Positive   Full code.

## 2021-04-01 NOTE — PROGRESS NOTE ADULT - SUBJECTIVE AND OBJECTIVE BOX
Patient is a 70y old  Male who presents with a chief complaint of SOB (01 Apr 2021 12:26)        SUBJECTIVE:    no events. feels better today. remains on HFNC 60 L/min 70%.     REVIEW OF SYSTEMS:  See HPI    PHYSICAL EXAM  Vital Signs Last 24 Hrs  T(C): 36.8 (01 Apr 2021 12:34), Max: 36.8 (01 Apr 2021 12:34)  T(F): 98.2 (01 Apr 2021 12:34), Max: 98.2 (01 Apr 2021 12:34)  HR: 96 (01 Apr 2021 12:34) (85 - 96)  BP: 121/60 (01 Apr 2021 12:34) (84/70 - 128/81)  BP(mean): --  RR: 20 (01 Apr 2021 12:34) (18 - 20)  SpO2: 96% (01 Apr 2021 12:34) (92% - 96%)    General: In NAD  HEENT: DELFINA               Lymphatic system: No Cervical LN    Respiratory: Bilateral crackles.   Cardiovascular: Regular  Gastrointestinal: Soft. + BS  Musculoskeletal: No clubbing.  Moves all extremities.  Full range of motion. +edema    Skin: Warm.  Intact  Neurological: No motor or sensory deficit. AAOx3      03-31-21 @ 07:01  -  04-01-21 @ 07:00  --------------------------------------------------------  IN:  Total IN: 0 mL    OUT:    Voided (mL): 4300 mL  Total OUT: 4300 mL    Total NET: -4300 mL      04-01-21 @ 07:01  -  04-01-21 @ 13:39  --------------------------------------------------------  IN:  Total IN: 0 mL    OUT:    Voided (mL): 600 mL  Total OUT: 600 mL    Total NET: -600 mL          LABS:                          13.2   12.32 )-----------( 143      ( 01 Apr 2021 07:18 )             40.5                                               04-01    146  |  105  |  85<HH>  ----------------------------<  100<H>  5.3<H>   |  22  |  1.6<H>    Ca    9.3      01 Apr 2021 07:18  Mg     1.9     04-01    TPro  6.6  /  Alb  3.6  /  TBili  1.1  /  DBili  x   /  AST  57<H>  /  ALT  111<H>  /  AlkPhos  115  04-01                                                                                           LIVER FUNCTIONS - ( 01 Apr 2021 07:18 )  Alb: 3.6 g/dL / Pro: 6.6 g/dL / ALK PHOS: 115 U/L / ALT: 111 U/L / AST: 57 U/L / GGT: x                                                                                                MEDICATIONS  (STANDING):  aspirin enteric coated 81 milliGRAM(s) Oral daily  atorvastatin 80 milliGRAM(s) Oral at bedtime  buMETAnide Injectable 2 milliGRAM(s) IV Push every 12 hours  chlorhexidine 4% Liquid 1 Application(s) Topical daily  dexAMETHasone  Injectable 6 milliGRAM(s) IV Push daily  dextrose 40% Gel 15 Gram(s) Oral once  dextrose 5%. 1000 milliLiter(s) (50 mL/Hr) IV Continuous <Continuous>  dextrose 5%. 1000 milliLiter(s) (100 mL/Hr) IV Continuous <Continuous>  dextrose 50% Injectable 25 Gram(s) IV Push once  dextrose 50% Injectable 25 Gram(s) IV Push once  dextrose 50% Injectable 12.5 Gram(s) IV Push once  glucagon  Injectable 1 milliGRAM(s) IntraMuscular once  heparin   Injectable 5000 Unit(s) SubCutaneous every 8 hours  influenza   Vaccine 0.5 milliLiter(s) IntraMuscular once  insulin lispro (ADMELOG) corrective regimen sliding scale   SubCutaneous three times a day before meals  melatonin 5 milliGRAM(s) Oral at bedtime  metoprolol succinate ER 25 milliGRAM(s) Oral daily  polyethylene glycol 3350 17 Gram(s) Oral every 12 hours  senna 2 Tablet(s) Oral at bedtime  sodium chloride 3%. 150 milliLiter(s) (50 mL/Hr) IV Continuous <Continuous>  sodium zirconium cyclosilicate 10 Gram(s) Oral daily  zolpidem 5 milliGRAM(s) Oral at bedtime    MEDICATIONS  (PRN):  acetaminophen   Tablet .. 650 milliGRAM(s) Oral every 8 hours PRN Mild Pain (1 - 3)  guaifenesin/dextromethorphan  Syrup 5 milliLiter(s) Oral every 6 hours PRN Cough       Patient is a 70y old  Male who presents with a chief complaint of SOB (01 Apr 2021 12:26)        SUBJECTIVE:    no events. feels better today. remains on HFNC 60 L/min 70%.     REVIEW OF SYSTEMS:  See HPI    PHYSICAL EXAM  Vital Signs Last 24 Hrs  T(C): 36.8 (01 Apr 2021 12:34), Max: 36.8 (01 Apr 2021 12:34)  T(F): 98.2 (01 Apr 2021 12:34), Max: 98.2 (01 Apr 2021 12:34)  HR: 96 (01 Apr 2021 12:34) (85 - 96)  BP: 121/60 (01 Apr 2021 12:34) (84/70 - 128/81)  RR: 20 (01 Apr 2021 12:34) (18 - 20)  SpO2: 96% (01 Apr 2021 12:34) (92% - 96%)    General: ILL looking  HEENT: DELFINA               Lymphatic system: No Cervical LN    Respiratory: Bilateral crackles.   Cardiovascular: AURELIO 3.6  Gastrointestinal: Soft. + BS  Musculoskeletal: No clubbing.  Moves all extremities.  Full range of motion. +edema    Skin: Warm.  Intact  Neurological: No motor or sensory deficit. AAOx3      03-31-21 @ 07:01  -  04-01-21 @ 07:00  --------------------------------------------------------  IN:  Total IN: 0 mL    OUT:    Voided (mL): 4300 mL  Total OUT: 4300 mL    Total NET: -4300 mL      04-01-21 @ 07:01  -  04-01-21 @ 13:39  --------------------------------------------------------  IN:  Total IN: 0 mL    OUT:    Voided (mL): 600 mL  Total OUT: 600 mL    Total NET: -600 mL          LABS:                          13.2   12.32 )-----------( 143      ( 01 Apr 2021 07:18 )             40.5                                               04-01    146  |  105  |  85<HH>  ----------------------------<  100<H>  5.3<H>   |  22  |  1.6<H>    Ca    9.3      01 Apr 2021 07:18  Mg     1.9     04-01    TPro  6.6  /  Alb  3.6  /  TBili  1.1  /  DBili  x   /  AST  57<H>  /  ALT  111<H>  /  AlkPhos  115  04-01                                                                                           LIVER FUNCTIONS - ( 01 Apr 2021 07:18 )  Alb: 3.6 g/dL / Pro: 6.6 g/dL / ALK PHOS: 115 U/L / ALT: 111 U/L / AST: 57 U/L / GGT: x                                                                                                MEDICATIONS  (STANDING):  aspirin enteric coated 81 milliGRAM(s) Oral daily  atorvastatin 80 milliGRAM(s) Oral at bedtime  buMETAnide Injectable 2 milliGRAM(s) IV Push every 12 hours  chlorhexidine 4% Liquid 1 Application(s) Topical daily  dexAMETHasone  Injectable 6 milliGRAM(s) IV Push daily  dextrose 40% Gel 15 Gram(s) Oral once  dextrose 5%. 1000 milliLiter(s) (50 mL/Hr) IV Continuous <Continuous>  dextrose 5%. 1000 milliLiter(s) (100 mL/Hr) IV Continuous <Continuous>  dextrose 50% Injectable 25 Gram(s) IV Push once  dextrose 50% Injectable 25 Gram(s) IV Push once  dextrose 50% Injectable 12.5 Gram(s) IV Push once  glucagon  Injectable 1 milliGRAM(s) IntraMuscular once  heparin   Injectable 5000 Unit(s) SubCutaneous every 8 hours  influenza   Vaccine 0.5 milliLiter(s) IntraMuscular once  insulin lispro (ADMELOG) corrective regimen sliding scale   SubCutaneous three times a day before meals  melatonin 5 milliGRAM(s) Oral at bedtime  metoprolol succinate ER 25 milliGRAM(s) Oral daily  polyethylene glycol 3350 17 Gram(s) Oral every 12 hours  senna 2 Tablet(s) Oral at bedtime  sodium chloride 3%. 150 milliLiter(s) (50 mL/Hr) IV Continuous <Continuous>  sodium zirconium cyclosilicate 10 Gram(s) Oral daily  zolpidem 5 milliGRAM(s) Oral at bedtime    MEDICATIONS  (PRN):  acetaminophen   Tablet .. 650 milliGRAM(s) Oral every 8 hours PRN Mild Pain (1 - 3)  guaifenesin/dextromethorphan  Syrup 5 milliLiter(s) Oral every 6 hours PRN Cough

## 2021-04-01 NOTE — PROGRESS NOTE ADULT - ASSESSMENT
71 y/o man with PMH of CKD 3, HFrEF, HTN, DM, CAD and morbid obesity (BMI 42.3) presented  with few days of shortness of breath and he was sent to the ER by his nephrologist.      1. Acute Hypoxemic Respiratory Failure due to COVID 19 with severe illness  - s/p remdesivir x 1 dose and then stopped due to SAGE  - on decadron  - s/p course of abx (had elevated procalcitonin initially)  - COVID Antibody positive, no indication for plasma  - remains on HFNC - titrate O2 as tolerated  - CTA of chest negative for PE, VA duplex negative for DVT  - repeat inflammatory markers on 3/29 reviewed  - hypoxemia also due to pt being volume overloaded    2. Acute over chronic HFrEF - now EF has improved  - heart failure following  - diuresis with bumex 2mg IV q12hr and give 3% hypertonic saline q12hr  - continue bblocker  - Entresto held due to SAGE and hyperkalemia  - fluid restriction discussed with the pt  - I's and O's, daily wts, low sodium diet  - continue telemetry  - iron studies reviewed    3. SAGE over CKD 3 - overall improved and now stable  due to contrast vs COVID  renal f/u appreciated  avoid nephrotoxic agents  avoid overdiuresis    4. Hyperkalemia - low K diet, continue lokelma and monitor    5. DM - FS acceptable with diet control for now    6. HTN on metoprolol    7. CAD - on ASA/statin/metoprolol    8. DVT prophylaxis - heparin SQ     9. Transaminitis - could be from COVID and volume overload/hepatic congestion - trend    full code status      PROGRESS NOTE HANDOFF    Pending: titrate O2, diuresis, daily CBC and CMP  CXR in AM    Disposition: from home

## 2021-04-02 NOTE — CHART NOTE - NSCHARTNOTEFT_GEN_A_CORE
assessment conducted remotely d/t covid+ isolation precautions    # Acute Hypoxemic Respiratory Failure Secondary to COVID Pneumonia/HF- Pulm consult for BIPAP recommendations  # HFrEF - volume overloaded- change diet w/ fluid restriction  # SAGE on CKD - improved - no need for dialysis    EMR documentation: 3/26-3/30 100% - pt likely w/ optimum appetite. pt noted on regular diet consistency. No GI discomfort, LBM 3/31 per RN flowsheets. AAOX4; skin: wound; 4+ R foot edema noted. labs reviewed -- elevated glucose/renal labs noted -- will cont to monitor; meds reviewed. Wt's noted -- fluctuations might be d/t edema.     1. adjust diet order to DASH/TLC  2. d/c renal restrictions as pt not on HD   3. Pt needs to be on fluid restrictions per progress notes - will discuss w/ MD   4. Obtain new wts     [id number]  pt not @ risk, RD to f/u in 7 days.   RD remains available: Sandy Hawley [phone] assessment conducted remotely d/t covid+ isolation precautions    # Acute Hypoxemic Respiratory Failure Secondary to COVID Pneumonia/HF- Pulm consult for BIPAP recommendations  # HFrEF - volume overloaded- change diet w/ fluid restriction  # SAGE on CKD - improved - no need for dialysis    EMR documentation: 3/26-3/30 100% - pt likely w/ optimum appetite. pt noted on regular diet consistency. No GI discomfort, LBM 3/31 per RN flowsheets. AAOX4; skin: wound; 4+ R foot edema noted. labs reviewed -- elevated glucose/renal labs noted -- will cont to monitor; meds reviewed. Wt's noted -- fluctuations might be d/t edema.     1. adjust diet order to DASH/TLC + CHO   2. d/c renal restrictions as pt not on HD   3. Pt needs to be on fluid restrictions per progress notes - will discuss w/ MD   4. Obtain new wts     [id number]  pt not @ risk, RD to f/u in 7 days.   RD remains available: Sandy Hawley [phone]

## 2021-04-02 NOTE — PROGRESS NOTE ADULT - SUBJECTIVE AND OBJECTIVE BOX
It is okay to give her copy of labs from last visit. [place] FOLLOW UP NOTE  --------------------------------------------------------------------------------  24 hour events/subjective: Patient examined. Appears comfortable.    PAST HISTORY  --------------------------------------------------------------------------------  No significant changes to PMH, PSH, FHx, SHx, unless otherwise noted    ALLERGIES & MEDICATIONS  --------------------------------------------------------------------------------  Allergies    ACE inhibitors (Angioedema)    Standing Inpatient Medications  aspirin enteric coated 81 milliGRAM(s) Oral daily  atorvastatin 80 milliGRAM(s) Oral at bedtime  buMETAnide Injectable 2 milliGRAM(s) IV Push every 12 hours  chlorhexidine 4% Liquid 1 Application(s) Topical daily  dexAMETHasone  Injectable 6 milliGRAM(s) IV Push daily  dextrose 40% Gel 15 Gram(s) Oral once  dextrose 5%. 1000 milliLiter(s) IV Continuous <Continuous>  dextrose 5%. 1000 milliLiter(s) IV Continuous <Continuous>  dextrose 50% Injectable 25 Gram(s) IV Push once  dextrose 50% Injectable 12.5 Gram(s) IV Push once  dextrose 50% Injectable 25 Gram(s) IV Push once  glucagon  Injectable 1 milliGRAM(s) IntraMuscular once  heparin   Injectable 5000 Unit(s) SubCutaneous every 8 hours  influenza   Vaccine 0.5 milliLiter(s) IntraMuscular once  insulin lispro (ADMELOG) corrective regimen sliding scale   SubCutaneous three times a day before meals  magnesium sulfate  IVPB 2 Gram(s) IV Intermittent every 2 hours  melatonin 5 milliGRAM(s) Oral at bedtime  metoprolol succinate ER 25 milliGRAM(s) Oral daily  polyethylene glycol 3350 17 Gram(s) Oral every 12 hours  senna 2 Tablet(s) Oral at bedtime  sodium chloride 3%. 150 milliLiter(s) IV Continuous <Continuous>  sodium chloride 3%. 150 milliLiter(s) IV Continuous <Continuous>  sodium zirconium cyclosilicate 10 Gram(s) Oral daily  zolpidem 5 milliGRAM(s) Oral at bedtime    PRN Inpatient Medications  acetaminophen   Tablet .. 650 milliGRAM(s) Oral every 8 hours PRN  guaifenesin/dextromethorphan  Syrup 5 milliLiter(s) Oral every 6 hours PRN      VITALS/PHYSICAL EXAM  --------------------------------------------------------------------------------  T(C): 36.7 (04-02-21 @ 13:30), Max: 37.1 (04-01-21 @ 16:05)  HR: 89 (04-02-21 @ 13:30) (88 - 93)  BP: 104/67 (04-02-21 @ 13:30) (104/67 - 121/88)  RR: 18 (04-02-21 @ 13:30) (18 - 19)  SpO2: 99% (04-02-21 @ 08:00) (88% - 99%)    04-01-21 @ 07:01  -  04-02-21 @ 07:00  --------------------------------------------------------  IN: 150 mL / OUT: 2450 mL / NET: -2300 mL    04-02-21 @ 07:01  -  04-02-21 @ 14:07  --------------------------------------------------------  IN: 390 mL / OUT: 1350 mL / NET: -960 mL    Physical Exam:  	Gen: NAD  	Pulm: CTA B/L  	CV: RRR, S1S2  	Abd: +BS, soft, nontender/nondistended  	: No suprapubic tenderness  	LE: Warm, edema    LABS/STUDIES  --------------------------------------------------------------------------------              11.9   12.76 >-----------<  130      [04-02-21 @ 09:16]              37.2     143  |  106  |  88  ----------------------------<  223      [04-02-21 @ 09:16]  5.0   |  25  |  1.6        Ca     9.2     [04-02-21 @ 09:16]      Mg     1.6     [04-02-21 @ 09:16]    TPro  5.9  /  Alb  3.2  /  TBili  1.1  /  DBili  x   /  AST  38  /  ALT  79  /  AlkPhos  117  [04-02-21 @ 09:16]    Creatinine Trend:  SCr 1.6 [04-02 @ 09:16]  SCr 1.6 [04-01 @ 07:18]  SCr 1.8 [03-31 @ 16:00]  SCr 1.6 [03-31 @ 07:19]  SCr 1.6 [03-30 @ 16:00]    Urinalysis - [03-23-21 @ 09:35]      Color Yellow / Appearance Clear / SG 1.016 / pH 6.0      Gluc Negative / Ketone Negative  / Bili Negative / Urobili <2 mg/dL       Blood Small / Protein 300 mg/dL / Leuk Est Negative / Nitrite Negative      RBC 3 / WBC 3 / Hyaline 3 / Gran  / Sq Epi  / Non Sq Epi 1 / Bacteria Negative      Iron 58, TIBC 259, %sat 22      [03-31-21 @ 16:00]  Ferritin 1365      [03-31-21 @ 16:00]  HbA1c 6.1      [11-23-19 @ 08:02]  TSH 0.46      [03-20-21 @ 04:30]  Lipid: chol 87, TG 64, HDL 41, LDL --      [03-20-21 @ 04:30]

## 2021-04-02 NOTE — PROGRESS NOTE ADULT - SUBJECTIVE AND OBJECTIVE BOX
SUBJECTIVE:    Patient is a 70y old Male who presents with a chief complaint of SOB (30 Mar 2021 12:12)    Currently admitted to medicine with the primary diagnosis of Acute hypoxemic respiratory failure due to COVID-19/CHF     Today is hospital day 14d. Patient was seen and examined at bedside. This morning he is seen resting in his chair. No real clinical change from yesterday. Tolerated BIPAP well last night; he states it helped him a little bit, though he continues to be frustrated at his overall lack of progress    PAST MEDICAL & SURGICAL HISTORY  PAST MEDICAL & SURGICAL HISTORY:  Diverticulitis    Heart failure    Hypertension    Dyslipidemia    Diabetes    S/P partial resection of colon    ALLERGIES:  ACE inhibitors (Angioedema)    MEDICATIONS:  STANDING MEDICATIONS  aspirin enteric coated 81 milliGRAM(s) Oral daily  atorvastatin 80 milliGRAM(s) Oral at bedtime  buMETAnide Injectable 2 milliGRAM(s) IV Push every 12 hours  chlorhexidine 4% Liquid 1 Application(s) Topical daily  dexAMETHasone  Injectable 6 milliGRAM(s) IV Push daily  dextrose 40% Gel 15 Gram(s) Oral once  dextrose 5%. 1000 milliLiter(s) IV Continuous <Continuous>  dextrose 5%. 1000 milliLiter(s) IV Continuous <Continuous>  dextrose 50% Injectable 25 Gram(s) IV Push once  dextrose 50% Injectable 12.5 Gram(s) IV Push once  dextrose 50% Injectable 25 Gram(s) IV Push once  glucagon  Injectable 1 milliGRAM(s) IntraMuscular once  heparin   Injectable 5000 Unit(s) SubCutaneous every 8 hours  influenza   Vaccine 0.5 milliLiter(s) IntraMuscular once  insulin lispro (ADMELOG) corrective regimen sliding scale   SubCutaneous three times a day before meals  melatonin 5 milliGRAM(s) Oral at bedtime  metoprolol succinate ER 25 milliGRAM(s) Oral daily  polyethylene glycol 3350 17 Gram(s) Oral every 12 hours  senna 2 Tablet(s) Oral at bedtime  sodium chloride 3%. 150 milliLiter(s) IV Continuous <Continuous>  sodium chloride 3%. 150 milliLiter(s) IV Continuous <Continuous>  sodium zirconium cyclosilicate 10 Gram(s) Oral daily  zolpidem 5 milliGRAM(s) Oral at bedtime    PRN MEDICATIONS  acetaminophen   Tablet .. 650 milliGRAM(s) Oral every 8 hours PRN  guaifenesin/dextromethorphan  Syrup 5 milliLiter(s) Oral every 6 hours PRN    VITALS:   T(F): 97.7  HR: 89  BP: 120/70  RR: 18  SpO2: 99%    LABS:                        11.9   12.76 )-----------( 130      ( 02 Apr 2021 09:16 )             37.2     04-01    146  |  105  |  85<HH>  ----------------------------<  100<H>  5.3<H>   |  22  |  1.6<H>    Ca    9.3      01 Apr 2021 07:18  Mg     1.9     04-01    TPro  6.6  /  Alb  3.6  /  TBili  1.1  /  DBili  x   /  AST  57<H>  /  ALT  111<H>  /  AlkPhos  115  04-01    PHYSICAL EXAM:  GENERAL: NAD, speaks in full sentences. Mild respiratory distress  HEAD: Atraumatic  NECK: Supple  CHEST/LUNG: Slight crackles bilaterally   HEART: S1, S2; RRR; No murmurs, rubs, or gallops  ABDOMEN: BS+; Soft, Non-tender, Non-distended  EXTREMITIES:  2+ Peripheral Pulses. Prominent LE edema   PSYCH: AAOx3  NEUROLOGY: non-focal  SKIN: No rashes or lesions       SUBJECTIVE:    Patient is a 70y old Male who presents with a chief complaint of SOB (30 Mar 2021 12:12)    Currently admitted to medicine with the primary diagnosis of Acute hypoxemic respiratory failure due to COVID-19/CHF     Today is hospital day 14d. Patient was seen and examined at bedside. This morning he is seen resting in his chair. No real clinical change from yesterday. Tolerated BIPAP well last night; he states it helped him a little bit, though he continues to be frustrated at his overall lack of progress  no cp, abd pain, fever  sob unchanged, nonproductive cough, no orthopnea, pnd    PAST MEDICAL & SURGICAL HISTORY  PAST MEDICAL & SURGICAL HISTORY:  Diverticulitis    Heart failure    Hypertension    Dyslipidemia    Diabetes    S/P partial resection of colon    ALLERGIES:  ACE inhibitors (Angioedema)    MEDICATIONS:  STANDING MEDICATIONS  aspirin enteric coated 81 milliGRAM(s) Oral daily  atorvastatin 80 milliGRAM(s) Oral at bedtime  buMETAnide Injectable 2 milliGRAM(s) IV Push every 12 hours  chlorhexidine 4% Liquid 1 Application(s) Topical daily  dexAMETHasone  Injectable 6 milliGRAM(s) IV Push daily  dextrose 40% Gel 15 Gram(s) Oral once  dextrose 5%. 1000 milliLiter(s) IV Continuous <Continuous>  dextrose 5%. 1000 milliLiter(s) IV Continuous <Continuous>  dextrose 50% Injectable 25 Gram(s) IV Push once  dextrose 50% Injectable 12.5 Gram(s) IV Push once  dextrose 50% Injectable 25 Gram(s) IV Push once  glucagon  Injectable 1 milliGRAM(s) IntraMuscular once  heparin   Injectable 5000 Unit(s) SubCutaneous every 8 hours  influenza   Vaccine 0.5 milliLiter(s) IntraMuscular once  insulin lispro (ADMELOG) corrective regimen sliding scale   SubCutaneous three times a day before meals  melatonin 5 milliGRAM(s) Oral at bedtime  metoprolol succinate ER 25 milliGRAM(s) Oral daily  polyethylene glycol 3350 17 Gram(s) Oral every 12 hours  senna 2 Tablet(s) Oral at bedtime  sodium chloride 3%. 150 milliLiter(s) IV Continuous <Continuous>  sodium chloride 3%. 150 milliLiter(s) IV Continuous <Continuous>  sodium zirconium cyclosilicate 10 Gram(s) Oral daily  zolpidem 5 milliGRAM(s) Oral at bedtime    PRN MEDICATIONS  acetaminophen   Tablet .. 650 milliGRAM(s) Oral every 8 hours PRN  guaifenesin/dextromethorphan  Syrup 5 milliLiter(s) Oral every 6 hours PRN    VITALS:   T(F): 97.7  HR: 89  BP: 120/70  RR: 18  SpO2: 99%    LABS:                        11.9   12.76 )-----------( 130      ( 02 Apr 2021 09:16 )             37.2     04-01    146  |  105  |  85<HH>  ----------------------------<  100<H>  5.3<H>   |  22  |  1.6<H>    Ca    9.3      01 Apr 2021 07:18  Mg     1.9     04-01    TPro  6.6  /  Alb  3.6  /  TBili  1.1  /  DBili  x   /  AST  57<H>  /  ALT  111<H>  /  AlkPhos  115  04-01    PHYSICAL EXAM:  GENERAL: NAD, speaks in full sentences. Mild respiratory distress  HEAD: Atraumatic  NECK: Supple  CHEST/LUNG: Slight crackles bilaterally   HEART: S1, S2; RRR; No murmurs, rubs, or gallops  ABDOMEN: BS+; Soft, Non-tender, Non-distended  EXTREMITIES:  2+ Peripheral Pulses. Prominent LE edema   PSYCH: AAOx3  NEUROLOGY: non-focal  SKIN: No rashes or lesions

## 2021-04-03 NOTE — PROGRESS NOTE ADULT - ASSESSMENT
70 year old male patient with CKD, HFrEF, HTN, DM, CAD, and DM who presented on 03/19 with few days of shortness of breath, found to have COVID pneumonia, admitted for hypoxic RF secondary to COVID pneumonia.     Today is hospital day 15d. This morning he is resting comfortably in bed and reports no new issues or overnight events.   Patient still has SOB but satting well on HFNC.     # Acute Hypoxemic Respiratory Failure Secondary to COVID Pneumonia/HF  - Afebrile, Lymphopenia, Transaminitis, On HFNC  - CT angio chest: Negative for PE, [facility] duplex negative for DVT  - COVID Antibody positive, no indications for plasma  - s/p 1 dose of RDV, stopped secondary to low GFR  - s/p course of azithro and ceftriaxone  - c/w dexamethasone   - repeat inflammatory markers noted - decreased overall  - 4/2/21 --> WBC count stable. ; repeat procalcitonin 0.53, blood cultures ngt, and CXR unchanged  - Pulm on board for BIPAP recommendations, will repeat LE duplex    # HFrEF - volume overloaded, -600 today  * Home meds: Lasix 20mg 2x/week, Entresto 97,103mg BID, Toprol 25mg QD  - Pro-BNP 2718  - diet w/ fluid restriction  - strict I's and O's, Daily weights  - HF specialist consult appreciated   - Echo results noted  - Started bumex 2mg IV BID  - Will continue hypertonic saline with bumex administrations as per Dr. Locke until tomorrow, please follow the intake output to avoid over diuresing the patient    # SAGE on CKD - improved   * Baseline Cr 1.5, peaked at 3.5 now downtrending back to baseline, 1.7 today  - nephro following  - CT a/p negative for hydro  - no need for dialysis    HTN  * Home meds: Entresto 97, 103mg BID, Lasix 20mg 2x/week, Toprol 25mg QD  - will restart Entresto when more stable   - Continue Toprol 25mg QD    # DM II  - A1C: 6.9  - if FS >180 start basal - bolus coverage     # CAD//HLD  - c/w ASA, statin, BB    # DVT ppx - Heparin SQ   # GI ppx - PPI   # Diet - DASH/CC fluid restriction  # COVID test - Positive   Full code.

## 2021-04-03 NOTE — CHART NOTE - NSCHARTNOTEFT_GEN_A_CORE
Patient seen and examined at bedside, his oxygen saturation has been dropping today. He is now bipap dependent on 100% FiO2, sating high 90s at rest, appears comfortable, no use of accessory muscles however oxygen saturation drops every time he moves. Spoke to the patient and his fiance, he is agreeable to getting intubated if there's a need. Will give him one extra dose of steroids STAT and move him to ED3 where the staff is better equipped to care for a critical patient like him. Given his multiple comorbidities, age and severe hypoxemic respiratory failure due to COVID-19, his prognosis is guarded. Will continue to monitor.

## 2021-04-03 NOTE — PROGRESS NOTE ADULT - SUBJECTIVE AND OBJECTIVE BOX
SI FOLLOW UP NOTE  --------------------------------------------------------------------------------  Chief Complaint/24 hour events/subjective:    still SOB on O2, good UO    PAST HISTORY  --------------------------------------------------------------------------------  No significant changes to PMH, PSH, FHx, SHx, unless otherwise noted    ALLERGIES & MEDICATIONS  --------------------------------------------------------------------------------  Allergies    ACE inhibitors (Angioedema)    Intolerances      Standing Inpatient Medications  aspirin enteric coated 81 milliGRAM(s) Oral daily  atorvastatin 80 milliGRAM(s) Oral at bedtime  buMETAnide Injectable 2 milliGRAM(s) IV Push every 12 hours  chlorhexidine 4% Liquid 1 Application(s) Topical daily  dexAMETHasone  Injectable 6 milliGRAM(s) IV Push daily  dextrose 40% Gel 15 Gram(s) Oral once  dextrose 5%. 1000 milliLiter(s) IV Continuous <Continuous>  dextrose 5%. 1000 milliLiter(s) IV Continuous <Continuous>  dextrose 50% Injectable 25 Gram(s) IV Push once  dextrose 50% Injectable 12.5 Gram(s) IV Push once  dextrose 50% Injectable 25 Gram(s) IV Push once  glucagon  Injectable 1 milliGRAM(s) IntraMuscular once  heparin   Injectable 5000 Unit(s) SubCutaneous every 8 hours  influenza   Vaccine 0.5 milliLiter(s) IntraMuscular once  insulin lispro (ADMELOG) corrective regimen sliding scale   SubCutaneous three times a day before meals  melatonin 5 milliGRAM(s) Oral at bedtime  metoprolol succinate ER 25 milliGRAM(s) Oral daily  polyethylene glycol 3350 17 Gram(s) Oral every 12 hours  senna 2 Tablet(s) Oral at bedtime  sodium chloride 3%. 150 milliLiter(s) IV Continuous <Continuous>  sodium chloride 3%. 150 milliLiter(s) IV Continuous <Continuous>  sodium chloride 3%. 150 milliLiter(s) IV Continuous <Continuous>  sodium chloride 3%. 150 milliLiter(s) IV Continuous <Continuous>  sodium zirconium cyclosilicate 10 Gram(s) Oral daily    PRN Inpatient Medications  acetaminophen   Tablet .. 650 milliGRAM(s) Oral every 8 hours PRN  guaifenesin/dextromethorphan  Syrup 5 milliLiter(s) Oral every 6 hours PRN      REVIEW OF SYSTEMS  --------------------------------------------------------------------------------    All other systems were reviewed and are negative, except as noted.    VITALS/PHYSICAL EXAM  --------------------------------------------------------------------------------  T(C): 35.6 (04-03-21 @ 05:44), Max: 36.7 (04-02-21 @ 13:30)  HR: 84 (04-03-21 @ 05:44) (84 - 98)  BP: 111/62 (04-03-21 @ 05:44) (104/67 - 118/69)  RR: 18 (04-03-21 @ 05:44) (18 - 19)  SpO2: 92% (04-03-21 @ 11:37) (87% - 95%)  Wt(kg): --        04-02-21 @ 07:01  -  04-03-21 @ 07:00  --------------------------------------------------------  IN: 2000 mL / OUT: 3450 mL / NET: -1450 mL    04-03-21 @ 07:01  -  04-03-21 @ 12:24  --------------------------------------------------------  IN: 330 mL / OUT: 1000 mL / NET: -670 mL      Physical Exam:    	Gen: no distress   	Pulm: CTA B/L  	CV: S1S2; no rub  	Abd: +BS, soft, nontender/nondistended  	LE:    edema      LABS/STUDIES  --------------------------------------------------------------------------------              12.7   11.35 >-----------<  130      [04-03-21 @ 04:30]              39.1     144  |  107  |  86  ----------------------------<  137      [04-03-21 @ 04:30]  5.0   |  24  |  1.7        Ca     9.5     [04-03-21 @ 04:30]      Mg     2.4     [04-03-21 @ 04:30]    TPro  6.4  /  Alb  3.3  /  TBili  1.2  /  DBili  x   /  AST  40  /  ALT  73  /  AlkPhos  163  [04-03-21 @ 04:30]          Creatinine Trend:  SCr 1.7 [04-03 @ 04:30]  SCr 1.6 [04-02 @ 09:16]  SCr 1.6 [04-01 @ 07:18]  SCr 1.8 [03-31 @ 16:00]  SCr 1.6 [03-31 @ 07:19]    Urinalysis - [03-23-21 @ 09:35]      Color Yellow / Appearance Clear / SG 1.016 / pH 6.0      Gluc Negative / Ketone Negative  / Bili Negative / Urobili <2 mg/dL       Blood Small / Protein 300 mg/dL / Leuk Est Negative / Nitrite Negative      RBC 3 / WBC 3 / Hyaline 3 / Gran  / Sq Epi  / Non Sq Epi 1 / Bacteria Negative      Iron 58, TIBC 259, %sat 22      [03-31-21 @ 16:00]  Ferritin 1365      [03-31-21 @ 16:00]  HbA1c 6.1      [11-23-19 @ 08:02]  TSH 0.46      [03-20-21 @ 04:30]  Lipid: chol 87, TG 64, HDL 41, LDL --      [03-20-21 @ 04:30]

## 2021-04-03 NOTE — PROGRESS NOTE ADULT - SUBJECTIVE AND OBJECTIVE BOX
SUBJECTIVE:    Patient is a 70y old Male who presents with a chief complaint of SOB (02 Apr 2021 14:07)    Currently admitted to medicine with the primary diagnosis of Acute hypoxemic respiratory failure due to COVID-19       Today is hospital day 15d. This morning he is resting comfortably in bed and reports no new issues or overnight events.   Patient still has SOB but satting well on HFNC.     PAST MEDICAL & SURGICAL HISTORY  Diverticulitis    Heart failure    Hypertension    Dyslipidemia    Diabetes    S/P partial resection of colon      SOCIAL HISTORY:    ALLERGIES:  ACE inhibitors (Angioedema)    MEDICATIONS:  STANDING MEDICATIONS  aspirin enteric coated 81 milliGRAM(s) Oral daily  atorvastatin 80 milliGRAM(s) Oral at bedtime  buMETAnide Injectable 2 milliGRAM(s) IV Push every 12 hours  chlorhexidine 4% Liquid 1 Application(s) Topical daily  dexAMETHasone  Injectable 6 milliGRAM(s) IV Push daily  dextrose 40% Gel 15 Gram(s) Oral once  dextrose 5%. 1000 milliLiter(s) IV Continuous <Continuous>  dextrose 5%. 1000 milliLiter(s) IV Continuous <Continuous>  dextrose 50% Injectable 25 Gram(s) IV Push once  dextrose 50% Injectable 12.5 Gram(s) IV Push once  dextrose 50% Injectable 25 Gram(s) IV Push once  glucagon  Injectable 1 milliGRAM(s) IntraMuscular once  heparin   Injectable 5000 Unit(s) SubCutaneous every 8 hours  influenza   Vaccine 0.5 milliLiter(s) IntraMuscular once  insulin lispro (ADMELOG) corrective regimen sliding scale   SubCutaneous three times a day before meals  melatonin 5 milliGRAM(s) Oral at bedtime  metoprolol succinate ER 25 milliGRAM(s) Oral daily  polyethylene glycol 3350 17 Gram(s) Oral every 12 hours  senna 2 Tablet(s) Oral at bedtime  sodium chloride 3%. 150 milliLiter(s) IV Continuous <Continuous>  sodium chloride 3%. 150 milliLiter(s) IV Continuous <Continuous>  sodium chloride 3%. 150 milliLiter(s) IV Continuous <Continuous>  sodium chloride 3%. 150 milliLiter(s) IV Continuous <Continuous>  sodium zirconium cyclosilicate 10 Gram(s) Oral daily    PRN MEDICATIONS  acetaminophen   Tablet .. 650 milliGRAM(s) Oral every 8 hours PRN  guaifenesin/dextromethorphan  Syrup 5 milliLiter(s) Oral every 6 hours PRN    VITALS:   T(F): 96.1  HR: 84  BP: 111/62  RR: 18  SpO2: 95%    LABS:                        12.7   11.35 )-----------( 130      ( 03 Apr 2021 04:30 )             39.1     04-03    144  |  107  |  86<HH>  ----------------------------<  137<H>  5.0   |  24  |  1.7<H>    Ca    9.5      03 Apr 2021 04:30  Mg     2.4     04-03    TPro  6.4  /  Alb  3.3<L>  /  TBili  1.2  /  DBili  x   /  AST  40  /  ALT  73<H>  /  AlkPhos  163<H>  04-03              Culture - Blood (collected 01 Apr 2021 17:43)  Source: .Blood None  Preliminary Report (02 Apr 2021 22:01):    No growth to date.              RADIOLOGY:    PHYSICAL EXAM:  GENERAL: NAD, speaks in full sentences.   NECK: Supple  CHEST/LUNG: decreased breath sounds and crackles bilaterally   HEART: S1, S2; RRR; No murmurs, rubs, or gallops  ABDOMEN: BS+; Soft, Non-tender, Non-distended  EXTREMITIES:  2+ Peripheral Pulses. Prominent LE edema   PSYCH: AAOx3  NEUROLOGY: non-focal  SKIN: No rashes or lesions     SUBJECTIVE:    Patient is a 70y old Male who presents with a chief complaint of SOB (02 Apr 2021 14:07)    Currently admitted to medicine with the primary diagnosis of Acute hypoxemic respiratory failure due to COVID-19       Today is hospital day 15d. This morning he is resting comfortably in bed and reports no new issues or overnight events.   Patient still has SOB but satting well on HFNC.   update: patient had an episode of agitation, refusing to talk and wanting to be left alone. will discuss GOC again when stable.     PAST MEDICAL & SURGICAL HISTORY  Diverticulitis    Heart failure    Hypertension    Dyslipidemia    Diabetes    S/P partial resection of colon      SOCIAL HISTORY:    ALLERGIES:  ACE inhibitors (Angioedema)    MEDICATIONS:  STANDING MEDICATIONS  aspirin enteric coated 81 milliGRAM(s) Oral daily  atorvastatin 80 milliGRAM(s) Oral at bedtime  buMETAnide Injectable 2 milliGRAM(s) IV Push every 12 hours  chlorhexidine 4% Liquid 1 Application(s) Topical daily  dexAMETHasone  Injectable 6 milliGRAM(s) IV Push daily  dextrose 40% Gel 15 Gram(s) Oral once  dextrose 5%. 1000 milliLiter(s) IV Continuous <Continuous>  dextrose 5%. 1000 milliLiter(s) IV Continuous <Continuous>  dextrose 50% Injectable 25 Gram(s) IV Push once  dextrose 50% Injectable 12.5 Gram(s) IV Push once  dextrose 50% Injectable 25 Gram(s) IV Push once  glucagon  Injectable 1 milliGRAM(s) IntraMuscular once  heparin   Injectable 5000 Unit(s) SubCutaneous every 8 hours  influenza   Vaccine 0.5 milliLiter(s) IntraMuscular once  insulin lispro (ADMELOG) corrective regimen sliding scale   SubCutaneous three times a day before meals  melatonin 5 milliGRAM(s) Oral at bedtime  metoprolol succinate ER 25 milliGRAM(s) Oral daily  polyethylene glycol 3350 17 Gram(s) Oral every 12 hours  senna 2 Tablet(s) Oral at bedtime  sodium chloride 3%. 150 milliLiter(s) IV Continuous <Continuous>  sodium chloride 3%. 150 milliLiter(s) IV Continuous <Continuous>  sodium chloride 3%. 150 milliLiter(s) IV Continuous <Continuous>  sodium chloride 3%. 150 milliLiter(s) IV Continuous <Continuous>  sodium zirconium cyclosilicate 10 Gram(s) Oral daily    PRN MEDICATIONS  acetaminophen   Tablet .. 650 milliGRAM(s) Oral every 8 hours PRN  guaifenesin/dextromethorphan  Syrup 5 milliLiter(s) Oral every 6 hours PRN    VITALS:   T(F): 96.1  HR: 84  BP: 111/62  RR: 18  SpO2: 95%    LABS:                        12.7   11.35 )-----------( 130      ( 03 Apr 2021 04:30 )             39.1     04-03    144  |  107  |  86<HH>  ----------------------------<  137<H>  5.0   |  24  |  1.7<H>    Ca    9.5      03 Apr 2021 04:30  Mg     2.4     04-03    TPro  6.4  /  Alb  3.3<L>  /  TBili  1.2  /  DBili  x   /  AST  40  /  ALT  73<H>  /  AlkPhos  163<H>  04-03              Culture - Blood (collected 01 Apr 2021 17:43)  Source: .Blood None  Preliminary Report (02 Apr 2021 22:01):    No growth to date.              RADIOLOGY:    PHYSICAL EXAM:  GENERAL: NAD, speaks in full sentences.   NECK: Supple  CHEST/LUNG: decreased breath sounds and crackles bilaterally   HEART: S1, S2; RRR; No murmurs, rubs, or gallops  ABDOMEN: BS+; Soft, Non-tender, Non-distended  EXTREMITIES:  2+ Peripheral Pulses. Prominent LE edema   PSYCH: AAOx3  NEUROLOGY: non-focal  SKIN: No rashes or lesions     SUBJECTIVE:    Patient is a 70y old Male who presents with a chief complaint of SOB (02 Apr 2021 14:07)    Currently admitted to medicine with the primary diagnosis of Acute hypoxemic respiratory failure due to COVID-19       Today is hospital day 15d. This morning he is resting comfortably in bed and reports no new issues or overnight events.   Patient still has SOB but satting well on HFNC.   update: patient had an episode of agitation, refusing to talk and wanting to be left alone. will discuss GOC again when stable.   I called Miss JoyVioleta Cano, and discussed events. She stated patient gets agitated at times and he is upset as his son could not come and get keys. Patient never expressed any wishes to be DNR/DNI or otherwise as per her.     PAST MEDICAL & SURGICAL HISTORY  Diverticulitis    Heart failure    Hypertension    Dyslipidemia    Diabetes    S/P partial resection of colon      SOCIAL HISTORY:    ALLERGIES:  ACE inhibitors (Angioedema)    MEDICATIONS:  STANDING MEDICATIONS  aspirin enteric coated 81 milliGRAM(s) Oral daily  atorvastatin 80 milliGRAM(s) Oral at bedtime  buMETAnide Injectable 2 milliGRAM(s) IV Push every 12 hours  chlorhexidine 4% Liquid 1 Application(s) Topical daily  dexAMETHasone  Injectable 6 milliGRAM(s) IV Push daily  dextrose 40% Gel 15 Gram(s) Oral once  dextrose 5%. 1000 milliLiter(s) IV Continuous <Continuous>  dextrose 5%. 1000 milliLiter(s) IV Continuous <Continuous>  dextrose 50% Injectable 25 Gram(s) IV Push once  dextrose 50% Injectable 12.5 Gram(s) IV Push once  dextrose 50% Injectable 25 Gram(s) IV Push once  glucagon  Injectable 1 milliGRAM(s) IntraMuscular once  heparin   Injectable 5000 Unit(s) SubCutaneous every 8 hours  influenza   Vaccine 0.5 milliLiter(s) IntraMuscular once  insulin lispro (ADMELOG) corrective regimen sliding scale   SubCutaneous three times a day before meals  melatonin 5 milliGRAM(s) Oral at bedtime  metoprolol succinate ER 25 milliGRAM(s) Oral daily  polyethylene glycol 3350 17 Gram(s) Oral every 12 hours  senna 2 Tablet(s) Oral at bedtime  sodium chloride 3%. 150 milliLiter(s) IV Continuous <Continuous>  sodium chloride 3%. 150 milliLiter(s) IV Continuous <Continuous>  sodium chloride 3%. 150 milliLiter(s) IV Continuous <Continuous>  sodium chloride 3%. 150 milliLiter(s) IV Continuous <Continuous>  sodium zirconium cyclosilicate 10 Gram(s) Oral daily    PRN MEDICATIONS  acetaminophen   Tablet .. 650 milliGRAM(s) Oral every 8 hours PRN  guaifenesin/dextromethorphan  Syrup 5 milliLiter(s) Oral every 6 hours PRN    VITALS:   T(F): 96.1  HR: 84  BP: 111/62  RR: 18  SpO2: 95%    LABS:                        12.7   11.35 )-----------( 130      ( 03 Apr 2021 04:30 )             39.1     04-03    144  |  107  |  86<HH>  ----------------------------<  137<H>  5.0   |  24  |  1.7<H>    Ca    9.5      03 Apr 2021 04:30  Mg     2.4     04-03    TPro  6.4  /  Alb  3.3<L>  /  TBili  1.2  /  DBili  x   /  AST  40  /  ALT  73<H>  /  AlkPhos  163<H>  04-03              Culture - Blood (collected 01 Apr 2021 17:43)  Source: .Blood None  Preliminary Report (02 Apr 2021 22:01):    No growth to date.              RADIOLOGY:    PHYSICAL EXAM:  GENERAL: NAD, speaks in full sentences.   NECK: Supple  CHEST/LUNG: decreased breath sounds and crackles bilaterally   HEART: S1, S2; RRR; No murmurs, rubs, or gallops  ABDOMEN: BS+; Soft, Non-tender, Non-distended  EXTREMITIES:  2+ Peripheral Pulses. Prominent LE edema   PSYCH: AAOx3  NEUROLOGY: non-focal  SKIN: No rashes or lesions     SUBJECTIVE:    Patient is a 70y old Male who presents with a chief complaint of SOB (02 Apr 2021 14:07)    Currently admitted to medicine with the primary diagnosis of Acute hypoxemic respiratory failure due to COVID-19       Today is hospital day 15d. This morning he is resting comfortably in bed and reports no new issues or overnight events.   Patient still has SOB but satting well on HFNC.   update: patient had an episode of agitation, refusing to talk and wanting to be left alone. will discuss GOC again when stable.   I called Miss Joy Violeta Russo, and discussed events. She stated patient gets agitated at times and he is upset as his son could not come and get keys. Patient never expressed any wishes to be DNR/DNI or otherwise as per her.   pt refused history    PAST MEDICAL & SURGICAL HISTORY  Diverticulitis    Heart failure    Hypertension    Dyslipidemia    Diabetes    S/P partial resection of colon      SOCIAL HISTORY:    ALLERGIES:  ACE inhibitors (Angioedema)    MEDICATIONS:  STANDING MEDICATIONS  aspirin enteric coated 81 milliGRAM(s) Oral daily  atorvastatin 80 milliGRAM(s) Oral at bedtime  buMETAnide Injectable 2 milliGRAM(s) IV Push every 12 hours  chlorhexidine 4% Liquid 1 Application(s) Topical daily  dexAMETHasone  Injectable 6 milliGRAM(s) IV Push daily  dextrose 40% Gel 15 Gram(s) Oral once  dextrose 5%. 1000 milliLiter(s) IV Continuous <Continuous>  dextrose 5%. 1000 milliLiter(s) IV Continuous <Continuous>  dextrose 50% Injectable 25 Gram(s) IV Push once  dextrose 50% Injectable 12.5 Gram(s) IV Push once  dextrose 50% Injectable 25 Gram(s) IV Push once  glucagon  Injectable 1 milliGRAM(s) IntraMuscular once  heparin   Injectable 5000 Unit(s) SubCutaneous every 8 hours  influenza   Vaccine 0.5 milliLiter(s) IntraMuscular once  insulin lispro (ADMELOG) corrective regimen sliding scale   SubCutaneous three times a day before meals  melatonin 5 milliGRAM(s) Oral at bedtime  metoprolol succinate ER 25 milliGRAM(s) Oral daily  polyethylene glycol 3350 17 Gram(s) Oral every 12 hours  senna 2 Tablet(s) Oral at bedtime  sodium chloride 3%. 150 milliLiter(s) IV Continuous <Continuous>  sodium chloride 3%. 150 milliLiter(s) IV Continuous <Continuous>  sodium chloride 3%. 150 milliLiter(s) IV Continuous <Continuous>  sodium chloride 3%. 150 milliLiter(s) IV Continuous <Continuous>  sodium zirconium cyclosilicate 10 Gram(s) Oral daily    PRN MEDICATIONS  acetaminophen   Tablet .. 650 milliGRAM(s) Oral every 8 hours PRN  guaifenesin/dextromethorphan  Syrup 5 milliLiter(s) Oral every 6 hours PRN    VITALS:   T(F): 96.1  HR: 84  BP: 111/62  RR: 18  SpO2: 95%    LABS:                        12.7   11.35 )-----------( 130      ( 03 Apr 2021 04:30 )             39.1     04-03    144  |  107  |  86<HH>  ----------------------------<  137<H>  5.0   |  24  |  1.7<H>    Ca    9.5      03 Apr 2021 04:30  Mg     2.4     04-03    TPro  6.4  /  Alb  3.3<L>  /  TBili  1.2  /  DBili  x   /  AST  40  /  ALT  73<H>  /  AlkPhos  163<H>  04-03              Culture - Blood (collected 01 Apr 2021 17:43)  Source: .Blood None  Preliminary Report (02 Apr 2021 22:01):    No growth to date.              RADIOLOGY:    PHYSICAL EXAM:  GENERAL: NAD, speaks in full sentences.   NECK: Supple  CHEST/LUNG: decreased breath sounds and crackles bilaterally   HEART: S1, S2; RRR; No murmurs, rubs, or gallops  ABDOMEN: BS+; Soft, Non-tender, Non-distended  EXTREMITIES:  2+ Peripheral Pulses. Prominent LE edema   PSYCH: AAOx3  NEUROLOGY: non-focal  SKIN: No rashes or lesions

## 2021-04-03 NOTE — CHART NOTE - NSCHARTNOTEFT_GEN_A_CORE
RRT called by rn for hypoxia. Pt hypoxic to low 80s on 100% nrb over high flow nc at 80 lpm, 100%. He was arousable, mental status intact. SBp to 110s, HR 90s. Diffuse crackles on exam. Pt with mild- mod resp distress, increased wob. He was placed on bipap with improvement in spo2 to 92. To remain on bipap continuous for now. Discussed intubation, cpr with pt, di; he is unsure on his decision at this time, to remain full code for now.    35 minutes spent on critical care planning RRT called by rn for hypoxia. Pt hypoxic to low 80s on 100% nrb over high flow nc at 80 lpm, 100%. He was arousable, mental status intact. SBp to 110s, HR 90s. Diffuse crackles on exam. Pt with mild- mod resp distress, increased wob. He was placed on bipap with improvement in spo2 to 92. To remain on bipap continuous for now. Discussed intubation, cpr with pt, di; he is unsure on his decision at this time, to remain full code for now. Check repeat cxr.    35 minutes spent on critical care planning

## 2021-04-04 NOTE — PROGRESS NOTE ADULT - ASSESSMENT
IMPRESSION:    Acute hypoxemic respiratory failure still on 100% bipap  Severe COVID pneumonia;  possible bacterial superinfection  HFmrEF  Probable SAIDA   SAGE on CKD;     PLAN:    CNS: NO depressants     HEENT: Oral care    PULMONARY:  HOB @ 45 degrees.  Aspiration precautions.  Wean O2.  May start BIPAP 12/6 at bedtime. HFNC during day    CARDIOVASCULAR:  Avoid volume overload . diuresis as tolerated     GI: GI prophylaxis.  Feeding.  Bowel regimen     RENAL:  Follow up lytes.  Correct as needed. lokelma. fu renal    INFECTIOUS DISEASE: repeat procal. ABX per ID    HEMATOLOGICAL:  DVT prophylaxis.  LE doppler neg    ENDOCRINE:  Follow up FS.     MUSCULOSKELETAL:  Bed Chair position   very poor prongosis  GOC

## 2021-04-04 NOTE — PROGRESS NOTE ADULT - SUBJECTIVE AND OBJECTIVE BOX
[facility] FOLLOW UP NOTE  --------------------------------------------------------------------------------  Chief Complaint/24 hour events/subjective:    pt sen, still on O2, mild SO    PAST HISTORY  --------------------------------------------------------------------------------  No significant changes to PMH, PSH, FHx, SHx, unless otherwise noted    ALLERGIES & MEDICATIONS  --------------------------------------------------------------------------------  Allergies    ACE inhibitors (Angioedema)    Intolerances      Standing Inpatient Medications  aspirin enteric coated 81 milliGRAM(s) Oral daily  atorvastatin 80 milliGRAM(s) Oral at bedtime  buMETAnide Injectable 2 milliGRAM(s) IV Push every 12 hours  chlorhexidine 4% Liquid 1 Application(s) Topical daily  dexAMETHasone  Injectable 6 milliGRAM(s) IV Push daily  dextrose 40% Gel 15 Gram(s) Oral once  dextrose 5%. 1000 milliLiter(s) IV Continuous <Continuous>  dextrose 5%. 1000 milliLiter(s) IV Continuous <Continuous>  dextrose 50% Injectable 25 Gram(s) IV Push once  dextrose 50% Injectable 12.5 Gram(s) IV Push once  dextrose 50% Injectable 25 Gram(s) IV Push once  glucagon  Injectable 1 milliGRAM(s) IntraMuscular once  heparin   Injectable 5000 Unit(s) SubCutaneous every 8 hours  influenza   Vaccine 0.5 milliLiter(s) IntraMuscular once  insulin lispro (ADMELOG) corrective regimen sliding scale   SubCutaneous three times a day before meals  melatonin 5 milliGRAM(s) Oral at bedtime  metoprolol succinate ER 25 milliGRAM(s) Oral daily  polyethylene glycol 3350 17 Gram(s) Oral every 12 hours  senna 2 Tablet(s) Oral at bedtime  sodium chloride 3%. 150 milliLiter(s) IV Continuous <Continuous>  sodium chloride 3%. 150 milliLiter(s) IV Continuous <Continuous>  sodium chloride 3%. 150 milliLiter(s) IV Continuous <Continuous>  sodium chloride 3%. 150 milliLiter(s) IV Continuous <Continuous>  sodium chloride 3%. 150 milliLiter(s) IV Continuous <Continuous>  sodium chloride 3%. 150 milliLiter(s) IV Continuous <Continuous>  sodium zirconium cyclosilicate 10 Gram(s) Oral daily    PRN Inpatient Medications  acetaminophen   Tablet .. 650 milliGRAM(s) Oral every 8 hours PRN  guaifenesin/dextromethorphan  Syrup 5 milliLiter(s) Oral every 6 hours PRN      REVIEW OF SYSTEMS  --------------------------------------------------------------------------------    All other systems were reviewed and are negative, except as noted.    VITALS/PHYSICAL EXAM  --------------------------------------------------------------------------------  T(C): 37.2 (04-04-21 @ 04:00), Max: 37.3 (04-04-21 @ 00:00)  HR: 92 (04-04-21 @ 10:00) (72 - 100)  BP: 106/73 (04-04-21 @ 04:00) (106/73 - 145/83)  RR: 27 (04-04-21 @ 10:00) (18 - 30)  SpO2: 98% (04-04-21 @ 10:00) (88% - 98%)  Wt(kg): --        04-03-21 @ 07:01  -  04-04-21 @ 07:00  --------------------------------------------------------  IN: 480 mL / OUT: 2000 mL / NET: -1520 mL      Physical Exam:    	Gen: on O2, no distress   	Pulm: B/L BS  	CV: S1S2; no rub  	Abd: +BS, soft, nontender/nondistended  	LE:  edema      LABS/STUDIES  --------------------------------------------------------------------------------              11.9   9.17  >-----------<  117      [04-04-21 @ 07:52]              36.5     149  |  112  |  94  ----------------------------<  155      [04-04-21 @ 07:52]  5.7   |  25  |  1.8        Ca     9.0     [04-04-21 @ 07:52]      Mg     2.4     [04-04-21 @ 07:52]    TPro  6.1  /  Alb  3.3  /  TBili  1.3  /  DBili  x   /  AST  32  /  ALT  56  /  AlkPhos  151  [04-04-21 @ 07:52]          Creatinine Trend:  SCr 1.8 [04-04 @ 07:52]  SCr 1.7 [04-03 @ 04:30]  SCr 1.6 [04-02 @ 09:16]  SCr 1.6 [04-01 @ 07:18]  SCr 1.8 [03-31 @ 16:00]    Urinalysis - [03-23-21 @ 09:35]      Color Yellow / Appearance Clear / SG 1.016 / pH 6.0      Gluc Negative / Ketone Negative  / Bili Negative / Urobili <2 mg/dL       Blood Small / Protein 300 mg/dL / Leuk Est Negative / Nitrite Negative      RBC 3 / WBC 3 / Hyaline 3 / Gran  / Sq Epi  / Non Sq Epi 1 / Bacteria Negative      Iron 58, TIBC 259, %sat 22      [03-31-21 @ 16:00]  Ferritin 1365      [03-31-21 @ 16:00]  HbA1c 6.1      [11-23-19 @ 08:02]  TSH 0.46      [03-20-21 @ 04:30]  Lipid: chol 87, TG 64, HDL 41, LDL --      [03-20-21 @ 04:30]

## 2021-04-04 NOTE — PROGRESS NOTE ADULT - SUBJECTIVE AND OBJECTIVE BOX
INTERVAL HPI/OVERNIGHT EVENTS:    SUBJECTIVE: Patient seen and examined at bedside.     no cp, abd pain, fever   sob unchanged, no orthopnea, pnd, cough    OBJECTIVE:    VITAL SIGNS:  Vital Signs Last 24 Hrs  T(C): 37.2 (04 Apr 2021 04:00), Max: 37.3 (04 Apr 2021 00:00)  T(F): 99 (04 Apr 2021 04:00), Max: 99.1 (04 Apr 2021 00:00)  HR: 92 (04 Apr 2021 10:00) (72 - 100)  BP: 106/73 (04 Apr 2021 04:00) (106/73 - 145/83)  BP(mean): 85 (04 Apr 2021 04:00) (85 - 90)  RR: 27 (04 Apr 2021 10:00) (18 - 30)  SpO2: 98% (04 Apr 2021 10:00) (88% - 98%)      PHYSICAL EXAM:    General: NAD  HEENT: NC/AT; PERRL, clear conjunctiva  Neck: supple  Respiratory: CTA b/l  Cardiovascular: +S1/S2; RRR  Abdomen: soft, NT/ND; +BS x4  Extremities: WWP, 2+ peripheral pulses b/l; no LE edema  Skin: normal color and turgor; no rash  Neurological:    MEDICATIONS:  MEDICATIONS  (STANDING):  aspirin enteric coated 81 milliGRAM(s) Oral daily  atorvastatin 80 milliGRAM(s) Oral at bedtime  buMETAnide Injectable 2 milliGRAM(s) IV Push every 12 hours  chlorhexidine 4% Liquid 1 Application(s) Topical daily  dexAMETHasone  Injectable 6 milliGRAM(s) IV Push daily  dextrose 40% Gel 15 Gram(s) Oral once  dextrose 5%. 1000 milliLiter(s) (50 mL/Hr) IV Continuous <Continuous>  dextrose 5%. 1000 milliLiter(s) (100 mL/Hr) IV Continuous <Continuous>  dextrose 50% Injectable 25 Gram(s) IV Push once  dextrose 50% Injectable 12.5 Gram(s) IV Push once  dextrose 50% Injectable 25 Gram(s) IV Push once  glucagon  Injectable 1 milliGRAM(s) IntraMuscular once  heparin   Injectable 5000 Unit(s) SubCutaneous every 8 hours  influenza   Vaccine 0.5 milliLiter(s) IntraMuscular once  insulin lispro (ADMELOG) corrective regimen sliding scale   SubCutaneous three times a day before meals  melatonin 5 milliGRAM(s) Oral at bedtime  metoprolol succinate ER 25 milliGRAM(s) Oral daily  polyethylene glycol 3350 17 Gram(s) Oral every 12 hours  senna 2 Tablet(s) Oral at bedtime  sodium chloride 3%. 150 milliLiter(s) (50 mL/Hr) IV Continuous <Continuous>  sodium chloride 3%. 150 milliLiter(s) (50 mL/Hr) IV Continuous <Continuous>  sodium chloride 3%. 150 milliLiter(s) (50 mL/Hr) IV Continuous <Continuous>  sodium chloride 3%. 150 milliLiter(s) (50 mL/Hr) IV Continuous <Continuous>  sodium chloride 3%. 150 milliLiter(s) (50 mL/Hr) IV Continuous <Continuous>  sodium chloride 3%. 150 milliLiter(s) (50 mL/Hr) IV Continuous <Continuous>  sodium zirconium cyclosilicate 10 Gram(s) Oral daily    MEDICATIONS  (PRN):  acetaminophen   Tablet .. 650 milliGRAM(s) Oral every 8 hours PRN Mild Pain (1 - 3)  guaifenesin/dextromethorphan  Syrup 5 milliLiter(s) Oral every 6 hours PRN Cough      ALLERGIES:  Allergies    ACE inhibitors (Angioedema)    Intolerances        LABS:                        11.9   9.17  )-----------( 117      ( 04 Apr 2021 07:52 )             36.5     Hemoglobin: 11.9 g/dL (04-04 @ 07:52)  Hemoglobin: 12.7 g/dL (04-03 @ 04:30)  Hemoglobin: 11.9 g/dL (04-02 @ 09:16)  Hemoglobin: 13.2 g/dL (04-01 @ 07:18)  Hemoglobin: 11.9 g/dL (03-31 @ 07:19)    CBC Full  -  ( 04 Apr 2021 07:52 )  WBC Count : 9.17 K/uL  RBC Count : 4.44 M/uL  Hemoglobin : 11.9 g/dL  Hematocrit : 36.5 %  Platelet Count - Automated : 117 K/uL  Mean Cell Volume : 82.2 fL  Mean Cell Hemoglobin : 26.8 pg  Mean Cell Hemoglobin Concentration : 32.6 g/dL  Auto Neutrophil # : 8.53 K/uL  Auto Lymphocyte # : 0.33 K/uL  Auto Monocyte # : 0.22 K/uL  Auto Eosinophil # : 0.00 K/uL  Auto Basophil # : 0.02 K/uL  Auto Neutrophil % : 93.0 %  Auto Lymphocyte % : 3.6 %  Auto Monocyte % : 2.4 %  Auto Eosinophil % : 0.0 %  Auto Basophil % : 0.2 %    04-04    149<H>  |  112<H>  |  94<HH>  ----------------------------<  155<H>  5.7<H>   |  25  |  1.8<H>    Ca    9.0      04 Apr 2021 07:52  Mg     2.4     04-04    TPro  6.1  /  Alb  3.3<L>  /  TBili  1.3<H>  /  DBili  x   /  AST  32  /  ALT  56<H>  /  AlkPhos  151<H>  04-04    Creatinine Trend: 1.8<--, 1.7<--, 1.6<--, 1.6<--, 1.8<--, 1.6<--  LIVER FUNCTIONS - ( 04 Apr 2021 07:52 )  Alb: 3.3 g/dL / Pro: 6.1 g/dL / ALK PHOS: 151 U/L / ALT: 56 U/L / AST: 32 U/L / GGT: x               hs Troponin:    ABG - ( 03 Apr 2021 21:47 )  pH, Arterial: 7.40  pH, Blood: x     /  pCO2: 53    /  pO2: 22    / HCO3: 33    / Base Excess: 6.4   /  SaO2: 29                  21:47 - ABG - pH: 7.40  |  pCO2: 53    |  pO2: 22    | Lactate:       | BE: 6.4          CSF:                      EKG:   MICROBIOLOGY:    Culture - Blood (collected 01 Apr 2021 17:43)  Source: .Blood None  Preliminary Report (02 Apr 2021 22:01):    No growth to date.      IMAGING:      Labs, imaging, EKG personally reviewed    RADIOLOGY & ADDITIONAL TESTS: Reviewed.

## 2021-04-04 NOTE — PROGRESS NOTE ADULT - ASSESSMENT
Acute severe COVID pneumonia  Acute hypoxemic respiratory failure  Acute on CKD (baseline serum creatinine 1.5 mg/dL), COVID vs. contrast as reason for acute kidney injury  Systolic CHF - usually NYHA Class 2-3 now Class 4  Hypernatremia and Hyperkalemia     Suggest:    low k diet  Lokelma for high k, f/u k level  high Na level likely due to hypertonic saline, may need to hold hypertonic saline if Na continues  to increase  Monitor kidney function on high dose Bumex and Na cl per HF service  Monitor I/O and BMP

## 2021-04-04 NOTE — PROGRESS NOTE ADULT - ASSESSMENT
70M PMHx HFpEF, HTN, DM2, CAD here with acute hypoxic resp failure, due to covid, HFpEF.    #Acute hypoxic resp failure, due to acute on chronic HFpEF, covid  desat to 80s on 100% nrb, high flow at 80 lpm 100%; placed on bipap with improvement  requiring continuous bipap  ongoing goc, full code for now  covid ab positive  decadron  s/p rdv one dose  bumex 2 iv bid  hypertonic saline per heart failure  cta noted  tte with grade I diastolic dysfunction  #Hypernatremia  with hyperchloremia  f/u heart failure if able to pause hypertonic saline  f/u renal  #SAGE, presumed pre-renal, possible caleb  trend scr  plateaued possible atn  c/b hyperkalemia, check ekg, lokelma  f/u renal .  #HTN  toprol 25  #DM2  controlled off medications  #CAD  asa  lipitor 80  #DVT ppx  subq hep    #Progress Note Handoff:  Pending (specify):  Consults_________, Tests________, Test Results_______, Other_____hypoxia____  Family discussion: d/w pt at bedside re: treatment plan, primary dx  Disposition: Home___/SNF___/Other________/Unknown at this time___x_____

## 2021-04-04 NOTE — PROGRESS NOTE ADULT - SUBJECTIVE AND OBJECTIVE BOX
OVERNIGHT EVENTS: events noted, on BIPAP 16/12 100%    Vital Signs Last 24 Hrs  T(C): 37.2 (04 Apr 2021 04:00), Max: 37.3 (04 Apr 2021 00:00)  T(F): 99 (04 Apr 2021 04:00), Max: 99.1 (04 Apr 2021 00:00)  HR: 96 (04 Apr 2021 04:00) (72 - 100)  BP: 106/73 (04 Apr 2021 04:00) (106/73 - 145/83)  BP(mean): 85 (04 Apr 2021 04:00) (85 - 90)  RR: 26 (04 Apr 2021 04:00) (18 - 30)  SpO2: 92% (04 Apr 2021 04:00) (88% - 95%)    PHYSICAL EXAMINATION:    GENERAL: ill looking    HEENT: Head is normocephalic and atraumatic.     NECK: Supple.    LUNGS: bl crackles    HEART: AURELIO 3/6    ABDOMEN: Soft, nontender, and nondistended.      EXTREMITIES: Without any cyanosis, clubbing, rash, lesions or edema.    NEUROLOGIC: Grossly intact.    SKIN: No ulceration or induration present.      LABS:                        12.7   11.35 )-----------( 130      ( 03 Apr 2021 04:30 )             39.1     04-03    144  |  107  |  86<HH>  ----------------------------<  137<H>  5.0   |  24  |  1.7<H>    Ca    9.5      03 Apr 2021 04:30  Mg     2.4     04-03    TPro  6.4  /  Alb  3.3<L>  /  TBili  1.2  /  DBili  x   /  AST  40  /  ALT  73<H>  /  AlkPhos  163<H>  04-03        ABG - ( 03 Apr 2021 21:47 )  pH, Arterial: 7.40  pH, Blood: x     /  pCO2: 53    /  pO2: 22    / HCO3: 33    / Base Excess: 6.4   /  SaO2: 29                          Procalcitonin, Serum: 0.53 ng/mL (04-01-21 @ 17:43)        04-02-21 @ 07:01  -  04-03-21 @ 07:00  --------------------------------------------------------  IN: 2000 mL / OUT: 3450 mL / NET: -1450 mL    04-03-21 @ 07:01  -  04-04-21 @ 06:58  --------------------------------------------------------  IN: 330 mL / OUT: 1750 mL / NET: -1420 mL        MICROBIOLOGY:  Culture Results:   No growth to date. (04-01 @ 17:43)      MEDICATIONS  (STANDING):  aspirin enteric coated 81 milliGRAM(s) Oral daily  atorvastatin 80 milliGRAM(s) Oral at bedtime  buMETAnide Injectable 2 milliGRAM(s) IV Push every 12 hours  chlorhexidine 4% Liquid 1 Application(s) Topical daily  dexAMETHasone  Injectable 6 milliGRAM(s) IV Push daily  dextrose 40% Gel 15 Gram(s) Oral once  dextrose 5%. 1000 milliLiter(s) (50 mL/Hr) IV Continuous <Continuous>  dextrose 5%. 1000 milliLiter(s) (100 mL/Hr) IV Continuous <Continuous>  dextrose 50% Injectable 25 Gram(s) IV Push once  dextrose 50% Injectable 12.5 Gram(s) IV Push once  dextrose 50% Injectable 25 Gram(s) IV Push once  glucagon  Injectable 1 milliGRAM(s) IntraMuscular once  heparin   Injectable 5000 Unit(s) SubCutaneous every 8 hours  influenza   Vaccine 0.5 milliLiter(s) IntraMuscular once  insulin lispro (ADMELOG) corrective regimen sliding scale   SubCutaneous three times a day before meals  melatonin 5 milliGRAM(s) Oral at bedtime  metoprolol succinate ER 25 milliGRAM(s) Oral daily  polyethylene glycol 3350 17 Gram(s) Oral every 12 hours  senna 2 Tablet(s) Oral at bedtime  sodium chloride 3%. 150 milliLiter(s) (50 mL/Hr) IV Continuous <Continuous>  sodium chloride 3%. 150 milliLiter(s) (50 mL/Hr) IV Continuous <Continuous>  sodium chloride 3%. 150 milliLiter(s) (50 mL/Hr) IV Continuous <Continuous>  sodium chloride 3%. 150 milliLiter(s) (50 mL/Hr) IV Continuous <Continuous>  sodium chloride 3%. 150 milliLiter(s) (50 mL/Hr) IV Continuous <Continuous>  sodium chloride 3%. 150 milliLiter(s) (50 mL/Hr) IV Continuous <Continuous>  sodium zirconium cyclosilicate 10 Gram(s) Oral daily    MEDICATIONS  (PRN):  acetaminophen   Tablet .. 650 milliGRAM(s) Oral every 8 hours PRN Mild Pain (1 - 3)  guaifenesin/dextromethorphan  Syrup 5 milliLiter(s) Oral every 6 hours PRN Cough      RADIOLOGY & ADDITIONAL STUDIES:

## 2021-04-05 NOTE — PROGRESS NOTE ADULT - ASSESSMENT
Mr Mcgill is a 69yo Man with medical history of HFrEF, HTN, DM2, and CAD here with acute hypoxic resp failure, due to covid, HFpEF.    #Acute hypoxic resp failure, due to acute on chronic HFrEF, covid  Pt with hx of HFrEF but now tte with grade I diastolic dysfunction, EF 50%  cta noted  currently on BIPAP FiO2 100% saturating 90%  requiring continuous bipap  ongoing goc, full code for now  covid ab positive  c/w decadron 6mg daily   s/p rdv one dose - held due to worsening renal function  c/w bumex 2 iv bid  hypertonic saline per heart failure  f/u Nephrology regarding RRT       #Hypernatremia  with hyperchloremia  f/u heart failure if able to pause hypertonic saline  f/u nephro     #SAGE, presumed pre-renal, possible caleb  trend scr inr 2.1 today  c/b hyperkalemia, check ekg, lokelma  f/u nephro    #HTN  Metoprolol Succ 25    #DM2  controlled off medications    #CAD  asa  lipitor 80    DVT ppx  subq hep    Progress Note Handoff:  Pending: adequate diuresis, improvement in renal function, oxygen titration  Dispo: Acute  Family: House staff updated Mr Mcgill is a 71yo Man with medical history of HFrEF, HTN, DM2, and CAD here with acute hypoxic resp failure, due to covid, HFpEF.    #Acute hypoxic resp failure, due to acute on chronic HFrEF, covid  Pt with hx of HFrEF but now tte with grade I diastolic dysfunction, EF 50%  cta noted  currently on BIPAP FiO2 100% saturating 90%  requiring continuous bipap  ongoing goc, full code for now  covid ab positive  c/w decadron 6mg daily   s/p rdv one dose - held due to worsening renal function  hold bumex 2 iv bid given worsening renal function  hypertonic saline per heart failure  f/u Nephrology regarding RRT       #Hypernatremia  with hyperchloremia  f/u heart failure if able to pause hypertonic saline  f/u nephro     #SAGE, presumed pre-renal, possible caleb  trend scr inr 2.1 today  c/b hyperkalemia, check ekg, lokelma  f/u nephro    #HTN  Metoprolol Succ 25    #DM2  controlled off medications    #CAD  asa  lipitor 80    DVT ppx  subq hep    Progress Note Handoff:  Pending: adequate diuresis, improvement in renal function, oxygen titration  Dispo: Acute  Family: House staff updated

## 2021-04-05 NOTE — PROGRESS NOTE ADULT - SUBJECTIVE AND OBJECTIVE BOX
Pt was seen and examined at bedside.  on Bipap. pt reported being hungry and thirsty and wanting to take off Bipap.      ROS: neg except as noted above    PAST MEDICAL & SURGICAL HISTORY:  Diverticulitis    Heart failure    Hypertension    Dyslipidemia    Diabetes    S/P partial resection of colon    Allergies    ACE inhibitors (Angioedema)    Intolerances    MEDICATIONS  (STANDING):  aspirin enteric coated 81 milliGRAM(s) Oral daily  atorvastatin 80 milliGRAM(s) Oral at bedtime  buMETAnide Injectable 2 milliGRAM(s) IV Push every 12 hours  chlorhexidine 4% Liquid 1 Application(s) Topical daily  dexAMETHasone  Injectable 6 milliGRAM(s) IV Push daily  glucagon  Injectable 1 milliGRAM(s) IntraMuscular once  heparin   Injectable 5000 Unit(s) SubCutaneous every 8 hours  influenza   Vaccine 0.5 milliLiter(s) IntraMuscular once  insulin lispro (ADMELOG) corrective regimen sliding scale   SubCutaneous three times a day before meals  melatonin 5 milliGRAM(s) Oral at bedtime  metoprolol succinate ER 25 milliGRAM(s) Oral daily  polyethylene glycol 3350 17 Gram(s) Oral every 12 hours  senna 2 Tablet(s) Oral at bedtime  sodium zirconium cyclosilicate 10 Gram(s) Oral daily    MEDICATIONS  (PRN):  acetaminophen   Tablet .. 650 milliGRAM(s) Oral every 8 hours PRN Mild Pain (1 - 3)  guaifenesin/dextromethorphan  Syrup 5 milliLiter(s) Oral every 6 hours PRN Cough    Vital Signs Last 24 Hrs  T(C): 36.4 (05 Apr 2021 08:00), Max: 36.6 (05 Apr 2021 07:00)  T(F): 97.6 (05 Apr 2021 08:00), Max: 97.8 (05 Apr 2021 07:00)  HR: 113 (05 Apr 2021 10:00) (86 - 113)  BP: 134/88 (05 Apr 2021 10:00) (107/76 - 165/90)  BP(mean): 110 (05 Apr 2021 08:00) (85 - 110)  RR: 33 (05 Apr 2021 08:00) (26 - 33)  SpO2: 90% (05 Apr 2021 08:00) (78% - 98%)    Physical Exam:  General: NAD  HEENT: NC/AT; PERRL, clear conjunctiva  Neck: supple  Respiratory: CTA b/l  Cardiovascular: +S1/S2; RRR  Abdomen: soft, NT/ND; +BS x4  Extremities: WWP, 2+ peripheral pulses b/l; no LE edema  Skin: normal color and turgor; no rash  Neurological:     Labs:   CBC                        13.5   11.51 )-----------( 90       ( 05 Apr 2021 07:40 )             43.6   CMP  04-05    156<H>  |  114<H>  |  109<HH>  ----------------------------<  132<H>  5.8<H>   |  23  |  2.1<H>    Ca    9.4      05 Apr 2021 07:40  Mg     2.6     04-05    TPro  6.6  /  Alb  3.3<L>  /  TBili  1.3<H>  /  DBili  x   /  AST  33  /  ALT  50<H>  /  AlkPhos  158<H>  04-05    Radiology:  < from: Xray Chest 1 View- PORTABLE-Routine (Xray Chest 1 View- PORTABLE-Routine in AM.) (04.05.21 @ 06:20) >    IMPRESSION:    Stable bilateral opacities.EXAM:  XR CHEST PORTABLE ROUTINE 1V            PROCEDURE DATE:  04/05/2021      < end of copied text >

## 2021-04-05 NOTE — PROGRESS NOTE ADULT - ASSESSMENT
Acute severe COVID pneumonia  Acute hypoxemic respiratory failure  Acute on CKD (baseline serum creatinine 1.5 mg/dL), COVID vs. contrast as reason for acute kidney injury  Systolic CHF - usually NYHA Class 2-3 now Class 4    Suggest:    Stop hypertonic saline  Minimize fluid intake  Extra dose Lokelma tonight  Would hold Bumex  Heart failure team followup  Monitor I/O  Trend BMP

## 2021-04-05 NOTE — PROGRESS NOTE ADULT - SUBJECTIVE AND OBJECTIVE BOX
[facility] NEPHROLOGY FOLLOW UP NOTE  --------------------------------------------------------------------------------  24 hour events/subjective: Patient examined. Appears comfortable.    PAST HISTORY  --------------------------------------------------------------------------------  No significant changes to PMH, PSH, FHx, SHx, unless otherwise noted    ALLERGIES & MEDICATIONS  --------------------------------------------------------------------------------  Allergies    ACE inhibitors (Angioedema)    Standing Inpatient Medications  aspirin enteric coated 81 milliGRAM(s) Oral daily  atorvastatin 80 milliGRAM(s) Oral at bedtime  chlorhexidine 4% Liquid 1 Application(s) Topical daily  dexAMETHasone  Injectable 6 milliGRAM(s) IV Push daily  glucagon  Injectable 1 milliGRAM(s) IntraMuscular once  heparin   Injectable 5000 Unit(s) SubCutaneous every 8 hours  influenza   Vaccine 0.5 milliLiter(s) IntraMuscular once  insulin lispro (ADMELOG) corrective regimen sliding scale   SubCutaneous three times a day before meals  melatonin 5 milliGRAM(s) Oral at bedtime  metoprolol succinate ER 25 milliGRAM(s) Oral daily  polyethylene glycol 3350 17 Gram(s) Oral every 12 hours  senna 2 Tablet(s) Oral at bedtime  sodium zirconium cyclosilicate 10 Gram(s) Oral once  sodium zirconium cyclosilicate 10 Gram(s) Oral daily    PRN Inpatient Medications  acetaminophen   Tablet .. 650 milliGRAM(s) Oral every 8 hours PRN  guaifenesin/dextromethorphan  Syrup 5 milliLiter(s) Oral every 6 hours PRN    VITALS/PHYSICAL EXAM  --------------------------------------------------------------------------------  T(C): 36.4 (04-05-21 @ 08:00), Max: 36.6 (04-05-21 @ 07:00)  HR: 107 (04-05-21 @ 12:00) (86 - 113)  BP: 126/85 (04-05-21 @ 12:00) (107/76 - 165/90)  RR: 30 (04-05-21 @ 13:20) (25 - 33)  SpO2: 89% (04-05-21 @ 13:20) (88% - 98%)    04-04-21 @ 07:01  -  04-05-21 @ 07:00  --------------------------------------------------------  IN: 0 mL / OUT: 3175 mL / NET: -3175 mL    04-05-21 @ 07:01  -  04-05-21 @ 14:43  --------------------------------------------------------  IN: 200 mL / OUT: 1150 mL / NET: -950 mL      Physical Exam:  	Gen: NAD  	Pulm: CTA B/L  	CV: RRR, S1S2  	Abd: +BS, soft, nontender/nondistended  	: No suprapubic tenderness  	LE: Warm, FROM, no clubbing, intact strength; no edema  	Vascular access:    LABS/STUDIES  --------------------------------------------------------------------------------              13.5   11.51 >-----------<  90       [04-05-21 @ 07:40]              43.6     156  |  114  |  109  ----------------------------<  132      [04-05-21 @ 07:40]  5.8   |  23  |  2.1        Ca     9.4     [04-05-21 @ 07:40]      Mg     2.6     [04-05-21 @ 07:40]    TPro  6.6  /  Alb  3.3  /  TBili  1.3  /  DBili  x   /  AST  33  /  ALT  50  /  AlkPhos  158  [04-05-21 @ 07:40]    Creatinine Trend:  SCr 2.1 [04-05 @ 07:40]  SCr 1.8 [04-04 @ 11:56]  SCr 1.8 [04-04 @ 07:52]  SCr 1.7 [04-03 @ 04:30]  SCr 1.6 [04-02 @ 09:16]    Urinalysis - [03-23-21 @ 09:35]      Color Yellow / Appearance Clear / SG 1.016 / pH 6.0      Gluc Negative / Ketone Negative  / Bili Negative / Urobili <2 mg/dL       Blood Small / Protein 300 mg/dL / Leuk Est Negative / Nitrite Negative      RBC 3 / WBC 3 / Hyaline 3 / Gran  / Sq Epi  / Non Sq Epi 1 / Bacteria Negative      Iron 58, TIBC 259, %sat 22      [03-31-21 @ 16:00]  Ferritin 1365      [03-31-21 @ 16:00]  HbA1c 6.1      [11-23-19 @ 08:02]  TSH 0.46      [03-20-21 @ 04:30]  Lipid: chol 87, TG 64, HDL 41, LDL --      [03-20-21 @ 04:30]

## 2021-04-05 NOTE — PROGRESS NOTE ADULT - SUBJECTIVE AND OBJECTIVE BOX
OVERNIGHT EVENTS: events noted, still on BIPAP 100%, afebrile    Vital Signs Last 24 Hrs  T(C): 36.4 (05 Apr 2021 08:00), Max: 36.6 (05 Apr 2021 07:00)  T(F): 97.6 (05 Apr 2021 08:00), Max: 97.8 (05 Apr 2021 07:00)  HR: 105 (05 Apr 2021 08:00) (86 - 112)  BP: 135/92 (05 Apr 2021 08:00) (107/76 - 165/90)  BP(mean): 110 (05 Apr 2021 08:00) (85 - 110)  RR: 33 (05 Apr 2021 08:00) (26 - 33)  SpO2: 90% (05 Apr 2021 08:00) (78% - 98%)    PHYSICAL EXAMINATION:    GENERAL: ill looking, tachypneic    HEENT: Head is normocephalic and atraumatic.    NECK: Supple.    LUNGS: bibasilar crackles    HEART: Regular rate and rhythm without murmur.    ABDOMEN: Soft, nontender, and nondistended.      EXTREMITIES: Without any cyanosis, clubbing, rash, lesions or edema.    NEUROLOGIC: Grossly intact.    SKIN: No ulceration or induration present.      LABS:                        13.5   11.51 )-----------( 90       ( 05 Apr 2021 07:40 )             43.6     04-05    156<H>  |  114<H>  |  109<HH>  ----------------------------<  132<H>  5.8<H>   |  23  |  2.1<H>    Ca    9.4      05 Apr 2021 07:40  Mg     2.6     04-05    TPro  6.6  /  Alb  3.3<L>  /  TBili  1.3<H>  /  DBili  x   /  AST  33  /  ALT  50<H>  /  AlkPhos  158<H>  04-05        ABG - ( 04 Apr 2021 13:02 )  pH, Arterial: 7.44  pH, Blood: x     /  pCO2: 40    /  pO2: 61    / HCO3: 27    / Base Excess: 2.7   /  SaO2: 90                                04-04-21 @ 07:01  -  04-05-21 @ 07:00  --------------------------------------------------------  IN: 0 mL / OUT: 3175 mL / NET: -3175 mL        MICROBIOLOGY:  Culture Results:   No growth to date. (04-01 @ 17:43)      MEDICATIONS  (STANDING):  aspirin enteric coated 81 milliGRAM(s) Oral daily  atorvastatin 80 milliGRAM(s) Oral at bedtime  buMETAnide Injectable 2 milliGRAM(s) IV Push every 12 hours  chlorhexidine 4% Liquid 1 Application(s) Topical daily  dexAMETHasone  Injectable 6 milliGRAM(s) IV Push daily  glucagon  Injectable 1 milliGRAM(s) IntraMuscular once  heparin   Injectable 5000 Unit(s) SubCutaneous every 8 hours  influenza   Vaccine 0.5 milliLiter(s) IntraMuscular once  insulin lispro (ADMELOG) corrective regimen sliding scale   SubCutaneous three times a day before meals  melatonin 5 milliGRAM(s) Oral at bedtime  metoprolol succinate ER 25 milliGRAM(s) Oral daily  polyethylene glycol 3350 17 Gram(s) Oral every 12 hours  senna 2 Tablet(s) Oral at bedtime  sodium zirconium cyclosilicate 10 Gram(s) Oral daily    MEDICATIONS  (PRN):  acetaminophen   Tablet .. 650 milliGRAM(s) Oral every 8 hours PRN Mild Pain (1 - 3)  guaifenesin/dextromethorphan  Syrup 5 milliLiter(s) Oral every 6 hours PRN Cough      RADIOLOGY & ADDITIONAL STUDIES:

## 2021-04-05 NOTE — PROGRESS NOTE ADULT - ASSESSMENT
IMPRESSION:    Acute hypoxemic respiratory failure still on 100% bipap  Severe COVID pneumonia;  possible bacterial superinfection  HFmrEF  Probable SAIDA   SAGE on CKD;   hypernatremia    PLAN:    CNS: NO depressants     HEENT: Oral care    PULMONARY:  HOB @ 45 degrees.  Aspiration precautions.  Wean O2.  May start BIPAP 12/6 at bedtime. HFNC during day    CARDIOVASCULAR:  Avoid volume overload . hold bumex today    GI: GI prophylaxis.  Feeding.  Bowel regimen     RENAL:  Follow up lytes.  Correct as needed. lokelma. fu renal    INFECTIOUS DISEASE: repeat procal. ABX per ID, fungitell    HEMATOLOGICAL:  DVT prophylaxis.  LE doppler neg    ENDOCRINE:  Follow up FS.     MUSCULOSKELETAL:  Bed Chair position   very poor prognosis   GOC

## 2021-04-05 NOTE — PROGRESS NOTE ADULT - SUBJECTIVE AND OBJECTIVE BOX
HAMMAD KING 70y Male  MRN#: [id number]   CODE STATUS: FULL      SUBJECTIVE  Patient is a 70y old Male who presents with a chief complaint of SOB (05 Apr 2021 11:55)    Currently admitted to medicine with the primary diagnosis of Acute hypoxemic respiratory failure due to COVID-19    Today is hospital day 17d, and this morning he is laying in bed comfortably and reports no overnight events.     OBJECTIVE  PAST MEDICAL & SURGICAL HISTORY  Diverticulitis    Heart failure    Hypertension    Dyslipidemia    Diabetes    S/P partial resection of colon      ALLERGIES:  ACE inhibitors (Angioedema)    MEDICATIONS:  STANDING MEDICATIONS  aspirin enteric coated 81 milliGRAM(s) Oral daily  atorvastatin 80 milliGRAM(s) Oral at bedtime  chlorhexidine 4% Liquid 1 Application(s) Topical daily  dexAMETHasone  Injectable 6 milliGRAM(s) IV Push daily  glucagon  Injectable 1 milliGRAM(s) IntraMuscular once  heparin   Injectable 5000 Unit(s) SubCutaneous every 8 hours  influenza   Vaccine 0.5 milliLiter(s) IntraMuscular once  insulin lispro (ADMELOG) corrective regimen sliding scale   SubCutaneous three times a day before meals  melatonin 5 milliGRAM(s) Oral at bedtime  metoprolol succinate ER 25 milliGRAM(s) Oral daily  polyethylene glycol 3350 17 Gram(s) Oral every 12 hours  senna 2 Tablet(s) Oral at bedtime  sodium zirconium cyclosilicate 10 Gram(s) Oral once  sodium zirconium cyclosilicate 10 Gram(s) Oral daily    PRN MEDICATIONS  acetaminophen   Tablet .. 650 milliGRAM(s) Oral every 8 hours PRN  guaifenesin/dextromethorphan  Syrup 5 milliLiter(s) Oral every 6 hours PRN      VITAL SIGNS: Last 24 Hours  T(C): 36.4 (05 Apr 2021 08:00), Max: 36.6 (05 Apr 2021 07:00)  T(F): 97.6 (05 Apr 2021 08:00), Max: 97.8 (05 Apr 2021 07:00)  HR: 107 (05 Apr 2021 12:00) (86 - 113)  BP: 126/85 (05 Apr 2021 12:00) (107/76 - 165/90)  BP(mean): 101 (05 Apr 2021 12:00) (85 - 110)  RR: 30 (05 Apr 2021 13:20) (25 - 33)  SpO2: 89% (05 Apr 2021 13:20) (88% - 98%)    LABS:                        13.5   11.51 )-----------( 90       ( 05 Apr 2021 07:40 )             43.6     04-05    156<H>  |  114<H>  |  109<HH>  ----------------------------<  132<H>  5.8<H>   |  23  |  2.1<H>    Ca    9.4      05 Apr 2021 07:40  Mg     2.6     04-05    TPro  6.6  /  Alb  3.3<L>  /  TBili  1.3<H>  /  DBili  x   /  AST  33  /  ALT  50<H>  /  AlkPhos  158<H>  04-05        ABG - ( 04 Apr 2021 13:02 )  pH, Arterial: 7.44  pH, Blood: x     /  pCO2: 40    /  pO2: 61    / HCO3: 27    / Base Excess: 2.7   /  SaO2: 90       RADIOLOGY:    < from: Xray Chest 1 View- PORTABLE-Routine (Xray Chest 1 View- PORTABLE-Routine in AM.) (04.05.21 @ 06:20) >  IMPRESSION:    Stable bilateral opacities.    < end of copied text >    PHYSICAL EXAM:    GENERAL: NAD, well-developed, AAOx3  HEENT:  Atraumatic, Normocephalic. EOMI, conjunctiva and sclera clear, No JVD  PULMONARY: decreased BS bilaterally; No wheeze or crackles noted  CARDIOVASCULAR: Regular rate and rhythm; No murmurs, rubs, or gallops  GASTROINTESTINAL: Soft, Nontender, Nondistended; Bowel sounds present  MUSCULOSKELETAL: no cyanosis or edema  NEUROLOGY: non-focal  SKIN: No rashes or lesions      ASSESSMENT & PLAN    Mr King is a 71yo Man with medical history of HFrEF, HTN, DM2, and CAD here with acute hypoxic resp failure, due to covid, HFpEF.    #Acute hypoxic resp failure, due to acute on chronic HFrEF, covid  Pt with hx of HFrEF but now tte with grade I diastolic dysfunction, EF 50%  cta noted  currently on BIPAP FiO2 100% saturating 90%  requiring continuous bipap now  ongoing goc, full code for now  covid ab positive  c/w decadron 6mg daily   s/p rdv one dose - held due to worsening renal function  holding bumex 2 iv bid given worsening renal function  holding hypertonic saline for now, awaiting HF team f/u  nephrology suggests this is due to covid as opposed to fluid overload as pt has been neg balance for past few days      #Hypernatremia- worsening  awaiting heart failure f/u, holding hypertonic saline for now  nephro f/u appreciated; suggest holding bumex    #SAGE, presumed pre-renal, possible caleb  Cr increased again to 2.1 from low of 1.6 few days ago (was 3.5 at peak)  holding bumex 2 iv bid given worsening renal function  holding hypertonic saline for now, awaiting HF team f/u  already on lokelma for hyperkalemia, additional dose ordered for this evening  f/u repeat ekg  nephro f/u appreciated    #HTN  Metoprolol Succ 25    #DM2  controlled off medications  monitor FS, start insulin regimen if consistently >180    #CAD  asa  lipitor 80    DVT ppx 7500 units subq hep q8 as per protocol  GI ppx: NA  Diet: NPO for now on bipap  Code Status: Full Code  Updated wife, Joy, at 3 PM.  HAMMAD KING 70y Male  MRN#: [id number]   CODE STATUS: FULL      SUBJECTIVE  Patient is a 70y old Male who presents with a chief complaint of SOB (05 Apr 2021 11:55)    Currently admitted to medicine with the primary diagnosis of Acute hypoxemic respiratory failure due to COVID-19    Today is hospital day 17d, and this morning he is laying in bed comfortably and reports no overnight events.     OBJECTIVE  PAST MEDICAL & SURGICAL HISTORY  Diverticulitis    Heart failure    Hypertension    Dyslipidemia    Diabetes    S/P partial resection of colon      ALLERGIES:  ACE inhibitors (Angioedema)    MEDICATIONS:  STANDING MEDICATIONS  aspirin enteric coated 81 milliGRAM(s) Oral daily  atorvastatin 80 milliGRAM(s) Oral at bedtime  chlorhexidine 4% Liquid 1 Application(s) Topical daily  dexAMETHasone  Injectable 6 milliGRAM(s) IV Push daily  glucagon  Injectable 1 milliGRAM(s) IntraMuscular once  heparin   Injectable 5000 Unit(s) SubCutaneous every 8 hours  influenza   Vaccine 0.5 milliLiter(s) IntraMuscular once  insulin lispro (ADMELOG) corrective regimen sliding scale   SubCutaneous three times a day before meals  melatonin 5 milliGRAM(s) Oral at bedtime  metoprolol succinate ER 25 milliGRAM(s) Oral daily  polyethylene glycol 3350 17 Gram(s) Oral every 12 hours  senna 2 Tablet(s) Oral at bedtime  sodium zirconium cyclosilicate 10 Gram(s) Oral once  sodium zirconium cyclosilicate 10 Gram(s) Oral daily    PRN MEDICATIONS  acetaminophen   Tablet .. 650 milliGRAM(s) Oral every 8 hours PRN  guaifenesin/dextromethorphan  Syrup 5 milliLiter(s) Oral every 6 hours PRN      VITAL SIGNS: Last 24 Hours  T(C): 36.4 (05 Apr 2021 08:00), Max: 36.6 (05 Apr 2021 07:00)  T(F): 97.6 (05 Apr 2021 08:00), Max: 97.8 (05 Apr 2021 07:00)  HR: 107 (05 Apr 2021 12:00) (86 - 113)  BP: 126/85 (05 Apr 2021 12:00) (107/76 - 165/90)  BP(mean): 101 (05 Apr 2021 12:00) (85 - 110)  RR: 30 (05 Apr 2021 13:20) (25 - 33)  SpO2: 89% (05 Apr 2021 13:20) (88% - 98%)    LABS:                        13.5   11.51 )-----------( 90       ( 05 Apr 2021 07:40 )             43.6     04-05    156<H>  |  114<H>  |  109<HH>  ----------------------------<  132<H>  5.8<H>   |  23  |  2.1<H>    Ca    9.4      05 Apr 2021 07:40  Mg     2.6     04-05    TPro  6.6  /  Alb  3.3<L>  /  TBili  1.3<H>  /  DBili  x   /  AST  33  /  ALT  50<H>  /  AlkPhos  158<H>  04-05        ABG - ( 04 Apr 2021 13:02 )  pH, Arterial: 7.44  pH, Blood: x     /  pCO2: 40    /  pO2: 61    / HCO3: 27    / Base Excess: 2.7   /  SaO2: 90       RADIOLOGY:    < from: Xray Chest 1 View- PORTABLE-Routine (Xray Chest 1 View- PORTABLE-Routine in AM.) (04.05.21 @ 06:20) >  IMPRESSION:    Stable bilateral opacities.    < end of copied text >    PHYSICAL EXAM:    GENERAL: NAD, well-developed, AAOx3  HEENT:  Atraumatic, Normocephalic. EOMI, conjunctiva and sclera clear, No JVD  PULMONARY: decreased BS bilaterally; No wheeze or crackles noted  CARDIOVASCULAR: Regular rate and rhythm; No murmurs, rubs, or gallops  GASTROINTESTINAL: Soft, Nontender, Nondistended; Bowel sounds present  MUSCULOSKELETAL: no cyanosis or edema  NEUROLOGY: non-focal  SKIN: No rashes or lesions      ASSESSMENT & PLAN    Mr King is a 69yo Man with medical history of HFrEF, HTN, DM2, and CAD here with acute hypoxic resp failure, due to covid, HFpEF.    #Acute hypoxic resp failure, due to acute on chronic HFrEF, covid  Pt with hx of HFrEF but now tte with grade I diastolic dysfunction, EF 50%  cta noted  currently on BIPAP FiO2 100% saturating 90%  requiring continuous bipap now  ongoing goc, full code for now  covid ab positive  c/w decadron 6mg daily   s/p rdv one dose - held due to worsening renal function  repeat inflammatory marker pending  fungitell pending  holding bumex 2 iv bid given worsening renal function  holding hypertonic saline for now, awaiting HF team f/u  nephrology suggests this is due to covid as opposed to fluid overload as pt has been neg balance for past few days      #Hypernatremia- worsening  awaiting heart failure f/u, holding hypertonic saline for now  nephro f/u appreciated; suggest holding bumex    #SAGE, presumed pre-renal, possible caleb  Cr increased again to 2.1 from low of 1.6 few days ago (was 3.5 at peak)  holding bumex 2 iv bid given worsening renal function  holding hypertonic saline for now, awaiting HF team f/u  already on lokelma for hyperkalemia, additional dose ordered for this evening  f/u repeat ekg  nephro f/u appreciated    #HTN  Metoprolol Succ 25    #DM2  controlled off medications  monitor FS, start insulin regimen if consistently >180    #CAD  asa  lipitor 80    DVT ppx 7500 units subq hep q8 as per protocol  GI ppx: NA  Diet: NPO for now on bipap  Code Status: Full Code  Updated wife, Joy, at 3 PM.

## 2021-04-06 NOTE — GOALS OF CARE CONVERSATION - ADVANCED CARE PLANNING - CONVERSATION DETAILS
D/w pt at bedside; pt is undecided on intubation, cpr. He has worsening hypoxia in setting of covid, now requiring contiuous bipap. He will remain full code at this time while making his decision, to discuss further with di as well.
During the AM rounds, Pt expressed his wish of not to be resuscitated or intubated if his condition worsens. In the event he is not able to make decisions, He wants his wife Joy to make clinical decisions on his behalf.     DNR/DNI order placed and Molst filled.

## 2021-04-06 NOTE — PROGRESS NOTE ADULT - NSICDXPILOT_GEN_ALL_CORE
[place]

## 2021-04-06 NOTE — PROGRESS NOTE ADULT - ASSESSMENT
71 yo M with PMHx of CKD (Unknown baseline), HFrEF, HTN, CAD, HLD, DM, LGIB secondary to Diverticulitis s/p colon resection.         Acute on chronic HFrEF/ Respiratory failure on HFNC/ SAGE / transaminitis     Hold diuretics   Continue Metoprolol ER 25 mg Daily  Get BMP daily    Maintain potassium >4.0, Mg >2.1  Strict intake and output  Daily weight   Will continue to follow  71 yo M with PMHx of CKD (Unknown baseline), HFrEF, HTN, CAD, HLD, DM, LGIB secondary to Diverticulitis s/p colon resection.         Acute on chronic HFrEF/ Respiratory failure on HFNC/ SAGE / ARDS    Patient remains with high oxygen requirement via BIPAP   POCUS shows IVC <2 cm and collapsing   Hold diuretics   D5W 75 ml/hr for 8 hrs   Repeat BMP in the afternoon   Continue Metoprolol ER 25 mg Daily  Get BMP daily    Maintain potassium >4.0, Mg >2.1  Strict intake and output  Management of ARDS /COVID per PCCM/primary team   Daily weight   Discussed with primary team  Will continue to follow

## 2021-04-06 NOTE — PROGRESS NOTE ADULT - PROVIDER SPECIALTY LIST ADULT
Hospitalist
Initial Anesthesia Post-op Note    Patient: Celia Moffett  Procedure(s) Performed: LAPAROSCOPIC CHOLECYSTECTOMY WITH INTRAOPERATIVE CHOLANGIOGRAM  Anesthesia type: General    Vital Last Value   Temperature 36 °C (96.8 °F) (04/15/18 1042)   Pulse 92 (04/15/18 1042)   Respiratory Rate 18 (04/15/18 1042)   Non-Invasive   Blood Pressure 142/77 (04/15/18 1042)   Arterial  Blood Pressure     Pulse Oximetry 96 % (04/15/18 1042)     Last 24 I/O:   Intake/Output Summary (Last 24 hours) at 04/15/18 1602  Last data filed at 04/15/18 1530   Gross per 24 hour   Intake             1450 ml   Output              100 ml   Net             1350 ml       PATIENT LOCATION: PACU Phase 1  POST-OP VITAL SIGNS: stable  LEVEL OF CONSCIOUSNESS: sedated and responds to stimulation  RESPIRATORY STATUS: spontaneous ventilation and face mask  CARDIOVASCULAR: hypertensive  HYDRATION: euvolemic    PAIN MANAGEMENT: well controlled  NAUSEA: None  POST-OP ASSESSMENT: no complications, patient tolerated procedure well with no complications and no evidence of recall  COMPLICATIONS: none  HANDOFF:  Handoff to receiving nurse was performed and questions were answered      
Internal Medicine
Nephrology
Nephrology
Pulmonology
Hospitalist
Internal Medicine
Nephrology
Nephrology
Pulmonology
Critical Care
Heart Failure
Heart Failure
Hospitalist
Internal Medicine
MICU
Nephrology
Pulmonology
Pulmonology
Hospitalist
Nephrology
Internal Medicine

## 2021-04-06 NOTE — CONSULT NOTE ADULT - ASSESSMENT
ASSESSMENT  Acute hypoxemic respiratory failure still on 100% bipap  Severe COVID pneumonia;  possible bacterial superinfection  HFmrEF  Probable SAIDA   SAGE on CKD worsenins  hypernatremia/hyperkalemia/hyperphosphatemia  hypermagnesemia  PLAN  PPN ordered for tonight   pt is DNR/DNI  if pt to ramain npo for longer   will need a central access for TPN  check bmp/phos/mg and correct lytes ASSESSMENT  Acute hypoxemic respiratory failure still on 100% bipap  Severe COVID pneumonia - symptomatic several weeks pta  possible bacterial superinfection  HFmrEF  Probable SAIDA   morbid obesity  SAGE on CKD worsening  hypernatremia/hyperkalemia/hyperphosphatemia/ hypermagnesemia      PLAN  PPN ordered for tonight - this is a  "bridge" treatment  pt is DNR/DNI  if pt to remain npo for longer, and if can not come off BiPAP for long enough to place naso-duodenal feeding tube, will need to consider a central access for TPN  check bmp/phos/mg and correct lytes

## 2021-04-06 NOTE — PROGRESS NOTE ADULT - SUBJECTIVE AND OBJECTIVE BOX
Hospital Day:  18d    Subjective: Patient is a 70y old  Male who presents with a chief complaint of SOB (05 Apr 2021 14:44)      Pt seen and evaluated at bedside.   Complaints:  Over the night Events:    Past Medical Hx:   No pertinent past medical history    Diverticulitis    Heart failure    Hypertension    Hyperlipemia    Dyslipidemia    Diabetes      Past Sx:  No significant past surgical history    S/P partial resection of colon      Allergies:  ACE inhibitors (Angioedema)    Current Meds:   Standng Meds:  aspirin enteric coated 81 milliGRAM(s) Oral daily  atorvastatin 80 milliGRAM(s) Oral at bedtime  chlorhexidine 4% Liquid 1 Application(s) Topical daily  dexAMETHasone  Injectable 6 milliGRAM(s) IV Push daily  glucagon  Injectable 1 milliGRAM(s) IntraMuscular once  heparin   Injectable 7500 Unit(s) SubCutaneous every 8 hours  influenza   Vaccine 0.5 milliLiter(s) IntraMuscular once  insulin lispro (ADMELOG) corrective regimen sliding scale   SubCutaneous three times a day before meals  melatonin 5 milliGRAM(s) Oral at bedtime  metoprolol succinate ER 25 milliGRAM(s) Oral daily  polyethylene glycol 3350 17 Gram(s) Oral every 12 hours  senna 2 Tablet(s) Oral at bedtime  sodium zirconium cyclosilicate 10 Gram(s) Oral daily    PRN Meds:  acetaminophen   Tablet .. 650 milliGRAM(s) Oral every 8 hours PRN Mild Pain (1 - 3)  guaifenesin/dextromethorphan  Syrup 5 milliLiter(s) Oral every 6 hours PRN Cough      Vital Signs:   T(F): 96.7 (04-06-21 @ 08:00), Max: 96.7 (04-06-21 @ 08:00)  HR: 119 (04-06-21 @ 08:00) (89 - 119)  BP: 130/79 (04-06-21 @ 08:00) (110/70 - 143/83)  RR: 21 (04-06-21 @ 08:00) (21 - 31)  SpO2: 95% (04-06-21 @ 08:00) (85% - 95%)    Physical Exam:   GENERAL: NAD, Resting in bed  HEENT: NCAT  CHEST/LUNG: Clear to auscultation bilaterally; No wheezing or rubs.   HEART: Regular rate and rhythm; No murmurs, rubs, or gallops  ABDOMEN: Bowel sounds present; Soft, Nontender, Nondistended.   EXTREMITIES:  No clubbing, cyanosis, or edema  NERVOUS SYSTEM:  Alert & Oriented X3    FLUID BALANCE    04-04-21 @ 07:01  -  04-05-21 @ 07:00  --------------------------------------------------------  IN: 0 mL / OUT: 3175 mL / NET: -3175 mL    04-05-21 @ 07:01  -  04-06-21 @ 07:00  --------------------------------------------------------  IN: 500 mL / OUT: 1600 mL / NET: -1100 mL    04-06-21 @ 07:01  -  04-06-21 @ 08:37  --------------------------------------------------------  IN: 0 mL / OUT: 400 mL / NET: -400 mL        Labs:                         13.0   9.24  )-----------( 77       ( 06 Apr 2021 06:44 )             41.1     Neutophil% 90.5, Lymphocyte% 5.0, Monocyte% 3.7, Bands% 0.6 04-06-21 @ 06:44    06 Apr 2021 06:44    156    |  115    |  x      ----------------------------<  136    5.4     |  22     |  2.4      Ca    9.5        06 Apr 2021 06:44  Mg     2.9       06 Apr 2021 06:44    TPro  6.7    /  Alb  3.3    /  TBili  1.3    /  DBili  x      /  AST  32     /  ALT  42     /  AlkPhos  170    06 Apr 2021 06:44    Iron --, TIBC --, %Sat -- Ferritin 1284 04-05-21 @ 11:02    Culture - Blood (collected 04-01-21 @ 17:43)  Source: .Blood None  Preliminary Report (04-02-21 @ 22:01):    No growth to date.    Procalcitonin, Serum: 0.53 ng/mL (04-01-21 @ 17:43)  Procalcitonin, Serum: 0.92 ng/mL (04-05-21 @ 11:02)    Ferritin, Serum: 1365 ng/mL (03-31-21 @ 16:00)  Ferritin, Serum: 1284 ng/mL (04-05-21 @ 11:02)    C-Reactive Protein, Serum: 174 mg/L (04-05-21 @ 11:02)    Radiology:     < from: Xray Chest 1 View- PORTABLE-Routine (Xray Chest 1 View- PORTABLE-Routine in AM.) (04.05.21 @ 06:20) >  IMPRESSION:    Stable bilateral opacities.    < end of copied text >   Hospital Day:  18d    Subjective: Patient is a 70y old  Male who presents with a chief complaint of SOB (05 Apr 2021 14:44)    Pt seen and evaluated at bedside.   Complaints: wanted to drink water, wants to be DNR/DNI and Wife Health Care proxy.   Over the night Events: none    Past Medical Hx:   No pertinent past medical history    Diverticulitis    Heart failure    Hypertension    Hyperlipemia    Dyslipidemia    Diabetes      Past Sx:  No significant past surgical history    S/P partial resection of colon      Allergies:  ACE inhibitors (Angioedema)    Current Meds:   Standng Meds:  aspirin enteric coated 81 milliGRAM(s) Oral daily  atorvastatin 80 milliGRAM(s) Oral at bedtime  chlorhexidine 4% Liquid 1 Application(s) Topical daily  dexAMETHasone  Injectable 6 milliGRAM(s) IV Push daily  glucagon  Injectable 1 milliGRAM(s) IntraMuscular once  heparin   Injectable 7500 Unit(s) SubCutaneous every 8 hours  influenza   Vaccine 0.5 milliLiter(s) IntraMuscular once  insulin lispro (ADMELOG) corrective regimen sliding scale   SubCutaneous three times a day before meals  melatonin 5 milliGRAM(s) Oral at bedtime  metoprolol succinate ER 25 milliGRAM(s) Oral daily  polyethylene glycol 3350 17 Gram(s) Oral every 12 hours  senna 2 Tablet(s) Oral at bedtime  sodium zirconium cyclosilicate 10 Gram(s) Oral daily    PRN Meds:  acetaminophen   Tablet .. 650 milliGRAM(s) Oral every 8 hours PRN Mild Pain (1 - 3)  guaifenesin/dextromethorphan  Syrup 5 milliLiter(s) Oral every 6 hours PRN Cough      Vital Signs:   T(F): 96.7 (04-06-21 @ 08:00), Max: 96.7 (04-06-21 @ 08:00)  HR: 119 (04-06-21 @ 08:00) (89 - 119)  BP: 130/79 (04-06-21 @ 08:00) (110/70 - 143/83)  RR: 21 (04-06-21 @ 08:00) (21 - 31)  SpO2: 95% (04-06-21 @ 08:00) (85% - 95%)    Physical Exam:   GENERAL: NAD, Resting in bed, obese  HEENT: NCAT on BiPAP  CHEST/LUNG: Clear to auscultation bilaterally; No wheezing or rubs.   HEART: Regular rate and rhythm; No murmurs, rubs, or gallops  ABDOMEN: Bowel sounds present; Soft, Nontender, Nondistended.   EXTREMITIES:  No clubbing, cyanosis, or edema  NERVOUS SYSTEM:  Alert & Oriented X3    FLUID BALANCE    04-04-21 @ 07:01  -  04-05-21 @ 07:00  --------------------------------------------------------  IN: 0 mL / OUT: 3175 mL / NET: -3175 mL    04-05-21 @ 07:01  -  04-06-21 @ 07:00  --------------------------------------------------------  IN: 500 mL / OUT: 1600 mL / NET: -1100 mL    04-06-21 @ 07:01  -  04-06-21 @ 08:37  --------------------------------------------------------  IN: 0 mL / OUT: 400 mL / NET: -400 mL    Labs:                         13.0   9.24  )-----------( 77       ( 06 Apr 2021 06:44 )             41.1     Neutophil% 90.5, Lymphocyte% 5.0, Monocyte% 3.7, Bands% 0.6 04-06-21 @ 06:44    06 Apr 2021 06:44    156    |  115    |  x      ----------------------------<  136    5.4     |  22     |  2.4      Ca    9.5        06 Apr 2021 06:44  Mg     2.9       06 Apr 2021 06:44    TPro  6.7    /  Alb  3.3    /  TBili  1.3    /  DBili  x      /  AST  32     /  ALT  42     /  AlkPhos  170    06 Apr 2021 06:44    Iron --, TIBC --, %Sat -- Ferritin 1284 04-05-21 @ 11:02    Culture - Blood (collected 04-01-21 @ 17:43)  Source: .Blood None  Preliminary Report (04-02-21 @ 22:01):    No growth to date.    Procalcitonin, Serum: 0.53 ng/mL (04-01-21 @ 17:43)  Procalcitonin, Serum: 0.92 ng/mL (04-05-21 @ 11:02)    Ferritin, Serum: 1365 ng/mL (03-31-21 @ 16:00)  Ferritin, Serum: 1284 ng/mL (04-05-21 @ 11:02)    C-Reactive Protein, Serum: 174 mg/L (04-05-21 @ 11:02)    Radiology:   < from: Xray Chest 1 View- PORTABLE-Routine (Xray Chest 1 View- PORTABLE-Routine in AM.) (04.05.21 @ 06:20) >  IMPRESSION: Stable bilateral opacities.  < end of copied text >

## 2021-04-06 NOTE — CONSULT NOTE ADULT - ATTENDING COMMENTS
71yo Man with medical history of HFrEF, HTN, DM2, and CAD here with acute hypoxic resp failure, due to covid, HFpEF. patient has acute resp failure and is on bipap. He has been getting diuresis. Patient also has SAGE on CKD.   Patient seen and examined as above. ID and HF teams being recalled by primary team. Palliative will continue to follow, please see recs above.

## 2021-04-06 NOTE — CONSULT NOTE ADULT - CONSULT REQUESTED DATE/TIME
29-Mar-2021 09:59
20-Mar-2021 08:30
20-Mar-2021 16:43
25-Mar-2021 14:41
06-Apr-2021
20-Mar-2021 06:39
06-Apr-2021

## 2021-04-06 NOTE — OCCUPATIONAL THERAPY INITIAL EVALUATION ADULT - SPECIFY REASON(S)
Attempted to see pt for OT evaluation, pt refused stating that he believed he is going to die today, OT attempted to encourage pt, however pt continued to refuse, resident MD aware of pt's statement

## 2021-04-06 NOTE — PROGRESS NOTE ADULT - REASON FOR ADMISSION
SOB

## 2021-04-06 NOTE — PROGRESS NOTE ADULT - SUBJECTIVE AND OBJECTIVE BOX
OVERNIGHT EVENTS: events noted, still on BIPAP 100%    Vital Signs Last 24 Hrs  T(C): 35.9 (06 Apr 2021 08:00), Max: 35.9 (05 Apr 2021 20:00)  T(F): 96.7 (06 Apr 2021 08:00), Max: 96.7 (06 Apr 2021 08:00)  HR: 84 (06 Apr 2021 08:49) (84 - 119)  BP: 130/79 (06 Apr 2021 08:00) (110/70 - 143/83)  BP(mean): 110 (05 Apr 2021 18:00) (88 - 110)  RR: 21 (06 Apr 2021 08:00) (21 - 31)  SpO2: 95% (06 Apr 2021 08:49) (85% - 95%)    PHYSICAL EXAMINATION:    GENERAL: Ill looking    HEENT: Head is normocephalic and atraumatic.     NECK: Supple.    LUNGS: bl crackles    HEART: Regular rate and rhythm without murmur.    ABDOMEN: Soft, nontender, and nondistended.      EXTREMITIES: Without any cyanosis, clubbing, rash, lesions or edema.    NEUROLOGIC: Grossly intact.    SKIN: No ulceration or induration present.      LABS:                        13.0   9.24  )-----------( 77       ( 06 Apr 2021 06:44 )             41.1     04-06    156<H>  |  115<H>  |  125<HH>  ----------------------------<  136<H>  5.4<H>   |  22  |  2.4<H>    Ca    9.5      06 Apr 2021 06:44  Mg     2.9     04-06    TPro  6.7  /  Alb  3.3<L>  /  TBili  1.3<H>  /  DBili  x   /  AST  32  /  ALT  42<H>  /  AlkPhos  170<H>  04-06        ABG - ( 04 Apr 2021 13:02 )  pH, Arterial: 7.44  pH, Blood: x     /  pCO2: 40    /  pO2: 61    / HCO3: 27    / Base Excess: 2.7   /  SaO2: 90                          Procalcitonin, Serum: 0.92 ng/mL (04-05-21 @ 11:02)        04-05-21 @ 07:01 - 04-06-21 @ 07:00  --------------------------------------------------------  IN: 500 mL / OUT: 1600 mL / NET: -1100 mL    04-06-21 @ 07:01  - 04-06-21 @ 09:46  --------------------------------------------------------  IN: 0 mL / OUT: 400 mL / NET: -400 mL        MICROBIOLOGY:      MEDICATIONS  (STANDING):  aspirin enteric coated 81 milliGRAM(s) Oral daily  atorvastatin 80 milliGRAM(s) Oral at bedtime  cefepime   IVPB      chlorhexidine 4% Liquid 1 Application(s) Topical daily  dexAMETHasone  Injectable 6 milliGRAM(s) IV Push daily  dextrose 5%. 1000 milliLiter(s) (75 mL/Hr) IV Continuous <Continuous>  glucagon  Injectable 1 milliGRAM(s) IntraMuscular once  heparin   Injectable 7500 Unit(s) SubCutaneous every 8 hours  influenza   Vaccine 0.5 milliLiter(s) IntraMuscular once  insulin lispro (ADMELOG) corrective regimen sliding scale   SubCutaneous three times a day before meals  melatonin 5 milliGRAM(s) Oral at bedtime  metoprolol succinate ER 25 milliGRAM(s) Oral daily  polyethylene glycol 3350 17 Gram(s) Oral every 12 hours  senna 2 Tablet(s) Oral at bedtime  sodium zirconium cyclosilicate 10 Gram(s) Oral daily    MEDICATIONS  (PRN):  acetaminophen   Tablet .. 650 milliGRAM(s) Oral every 8 hours PRN Mild Pain (1 - 3)  guaifenesin/dextromethorphan  Syrup 5 milliLiter(s) Oral every 6 hours PRN Cough      RADIOLOGY & ADDITIONAL STUDIES:

## 2021-04-06 NOTE — CONSULT NOTE ADULT - ASSESSMENT
70yMale being evaluated for goals of care and symptom management. Pt has advanced heart failure and CKD with SAGE, COVID (+) respiratory failure. Pt on BIPAP 100% FIO2 and is unable to reduce. Pt cannot come off BIPAP to HFNC, and is not improving today. Pt made himself DNR/DNI after meeting with medical resident.     Pt introduced to palliative care. He expressed understanding of his disease process, but frustration at not seeing all his providers consistently. Palliative NP and MD provided support as he expressed a desire to continue all ongoing medical management. He wants to fight this. We were supportive and said we were there for support for him and his wife. He would like pastoral care follow up. Says he is very [employer].     discussed care with medical team and nephrology MD     MEDD (morphine equivalent daily dose): 0      See Recs below.  - DNR/DNI  - Ongoing medical management  - recall ID and HF MD as pt is interested in all treatment options  - Palliative team will follow up with pt and his wife   - pastoral care follow up    Please call [phone] with questions or concerns 24/7.   We will continue to follow.    70yMale being evaluated for goals of care and symptom management. Pt has advanced heart failure and CKD with SAGE, COVID (+) respiratory failure. Pt on BIPAP 100% FIO2 and is unable to reduce. Pt cannot come off BIPAP to HFNC, and is not improving today. Pt made himself DNR/DNI after meeting with medical resident.     Pt introduced to palliative care. He expressed understanding of his disease process, but frustration at not seeing all his providers consistently. Palliative NP and MD provided support as he expressed a desire to continue all ongoing medical management. He wants to fight this. We were supportive and said we were there for support for him and his wife. He would like pastoral care follow up. Says he is very [employer].     discussed care with medical team and nephrology MD     MEDD (morphine equivalent daily dose): 0      See Recs below.  - DNR/DNI  - Ongoing medical management  - recall ID and HF MD as pt is interested in all treatment options  - Palliative team will follow up with pt and his wife   - pastoral care follow up    Please call [phone] with questions or concerns 24/7.   We will continue to follow.     Discussed with medical resident, bedside RN, Dr Porter from nephrology and patient

## 2021-04-06 NOTE — CONSULT NOTE ADULT - PROBLEM SELECTOR RECOMMENDATION 6
Patient on bipap due to dyspnea secondary to covid.  ID team being recalled for assistance with management.

## 2021-04-06 NOTE — CONSULT NOTE ADULT - PROBLEM SELECTOR RECOMMENDATION 2
Pt on BIPAP 100% FIO2 unable to ween. Pt wants to pursue medical optimization seeing specialists. Pt not on medications for dyspnea at this time. If needed would give Dilaudid 0.25mg IV q 3 hrs PRN for dyspnea

## 2021-04-06 NOTE — PROGRESS NOTE ADULT - SUBJECTIVE AND OBJECTIVE BOX
Date of Admission: 3/19/21    Interval History:     - Patient is euvolemic on exam    HISTORY OF PRESENT ILLNESS:     69 yo M with PMHx of CKD (Unknown baseline), HFrEF, HTN, HLD, DM, LGIB secondary to Diverticulitis s/p colon resection (at [facility] about 6.5 years ago) and CAD presenting today with SOB. Patient states he has had progressive SOB that has worsened in the last 2 weeks. Patient states he developed chills, productive cough, orthopnea, diarrhea, and diffuse abdominal discomfort x 6 weeks. Denies c/p, palpitations, LH/dizziness, LOC.       PAST MEDICAL & SURGICAL HISTORY:    Diabetes  Dyslipidemia  Hypertension  Heart failure  Diverticulitis  S/P partial resection of colon    FAMILY HISTORY:    Mother:  at 30 of heart disease  Father:  at 60 of kidney    SOCIAL HISTORY:      Former smoker, history of alcohol and drug use quit 17 years ago    Allergies    ACE inhibitors (Angioedema)    Intolerances        PHYSICAL EXAM:    General Appearance: well appearing, normal for age and gender. 	  Neck: normal JVP, no bruit.   Eyes:  Extra Ocular muscles intact.   Cardiovascular: regular rate and rhythm S1 S2, unable to assess JVD, No murmurs, + BLE edema  Respiratory: Lungs clear to auscultation	  Psychiatry: Alert and oriented x 3, Mood & affect appropriate  Gastrointestinal:  Soft, Non-tender  Skin/Integumen: No rashes, No ecchymoses, No cyanosis	  Neurologic: Non-focal  Musculoskeletal/ extremities: Normal range of motion, No clubbing, cyanosis, + BLE edema  Vascular: Peripheral pulses palpable 2+ bilaterally  	  PREVIOUS DIAGNOSTIC TESTING:          Home Medications:  Aspir 81 oral delayed release tablet: 1 tab(s) orally once a day (19 Mar 2021 16:01)  atorvastatin 80 mg oral tablet: 1 tab(s) orally once a day (19 Mar 2021 16:01)  Entresto 97 mg-103 mg oral tablet: 1 tab(s) orally 2 times a day (19 Mar 2021 16:01)  ezetimibe 10 mg oral tablet: 1 tab(s) orally once a day (19 Mar 2021 16:01)  Lasix 20 mg oral tablet: 1 tab(s) orally 2 times a week (19 Mar 2021 16:01)  metoprolol succinate 25 mg oral tablet, extended release: 1 tab(s) orally once a day (19 Mar 2021 16:01)  Vitamin C 1000 mg oral tablet: 1 tab(s) orally once a day (19 Mar 2021 16:01)  Vitamin D3 5000 intl units oral tablet: 1 tab(s) orally once a day (19 Mar 2021 16:01)     Date of Admission: 3/19/21    Interval History:     - Patient is euvolemic on exam. POCUS shows IVC collapsing, however, patient remains on high oxygen requirement via BIPAP    HISTORY OF PRESENT ILLNESS:     71 yo M with PMHx of CKD (Unknown baseline), HFrEF, HTN, HLD, DM, LGIB secondary to Diverticulitis s/p colon resection (at [facility] about 6.5 years ago) and CAD presenting today with SOB. Patient states he has had progressive SOB that has worsened in the last 2 weeks. Patient states he developed chills, productive cough, orthopnea, diarrhea, and diffuse abdominal discomfort x 6 weeks. Denies c/p, palpitations, LH/dizziness, LOC.       PAST MEDICAL & SURGICAL HISTORY:    Diabetes  Dyslipidemia  Hypertension  Heart failure  Diverticulitis  S/P partial resection of colon    FAMILY HISTORY:    Mother:  at 30 of heart disease  Father:  at 60 of kidney    SOCIAL HISTORY:      Former smoker, history of alcohol and drug use quit 17 years ago    Allergies    ACE inhibitors (Angioedema)    Intolerances        PHYSICAL EXAM:    General Appearance: well appearing, normal for age and gender. 	  Neck: normal JVP, no bruit.   Eyes:  Extra Ocular muscles intact.   Cardiovascular: regular rate and rhythm S1 S2, unable to assess JVD, No murmurs, + BLE edema  Respiratory: Lungs clear to auscultation	  Psychiatry: Alert and oriented x 3, Mood & affect appropriate  Gastrointestinal:  Soft, Non-tender  Skin/Integumen: No rashes, No ecchymoses, No cyanosis	  Neurologic: Non-focal  Musculoskeletal/ extremities: Normal range of motion, No clubbing, cyanosis, + BLE edema  Vascular: Peripheral pulses palpable 2+ bilaterally  	  PREVIOUS DIAGNOSTIC TESTING:          Home Medications:  Aspir 81 oral delayed release tablet: 1 tab(s) orally once a day (19 Mar 2021 16:01)  atorvastatin 80 mg oral tablet: 1 tab(s) orally once a day (19 Mar 2021 16:01)  Entresto 97 mg-103 mg oral tablet: 1 tab(s) orally 2 times a day (19 Mar 2021 16:01)  ezetimibe 10 mg oral tablet: 1 tab(s) orally once a day (19 Mar 2021 16:01)  Lasix 20 mg oral tablet: 1 tab(s) orally 2 times a week (19 Mar 2021 16:01)  metoprolol succinate 25 mg oral tablet, extended release: 1 tab(s) orally once a day (19 Mar 2021 16:01)  Vitamin C 1000 mg oral tablet: 1 tab(s) orally once a day (19 Mar 2021 16:01)  Vitamin D3 5000 intl units oral tablet: 1 tab(s) orally once a day (19 Mar 2021 16:01)

## 2021-04-06 NOTE — CONSULT NOTE ADULT - CONSULT REASON
venous stasis
ARF
SOB
COVID+/ NPO on BIPAP
abnormal renal function
Heart Failure
Goals of care and symptom management

## 2021-04-06 NOTE — CONSULT NOTE ADULT - SUBJECTIVE AND OBJECTIVE BOX
HAMMAD KING          MRN-[id number]              HPI:  69 yo M with PMHx of CKD (Unknown baseline), HFrEF (On Entresto), HTN, HLD, DM (A1c 6.5 per patient), LGIB secondary to Diverticulitis s/p colon resection (at [facility] about 6.5 years ago) and CAD presenting today with SOB. Patient states he has had progressive SOB x 6 weeks however it has worsened in the last 2 weeks. Patient states he developed chills, and productive cough x 2 days. Endorses diarrhea and diffuse abdominal discomfort x 6 weeks as well. Denies chest pain though    Vital Signs Last 24 Hrs  T(F): 99.5 (19 Mar 2021 12:07), Max: 102.3 (19 Mar 2021 08:53)  HR: 71 (19 Mar 2021 12:07) (71 - 93)  BP: 150/92 (19 Mar 2021 12:07) (136/84 - 150/92)  RR: 24 (19 Mar 2021 13:18) (20 - 24)  SpO2: 100% (19 Mar 2021 13:18) (92% - 100%)    In ED patient was received septic on admission (HR 93, Temp 102.3, RR 20, WBC <2k, COVID +) lactate 1.1  Was hypoxic to 84% with minimal exertion in the stretcher. Patient on high-lina at this time. Patient initially saturating at 96% on 3L NC; however later desatted requiring over 6L NC. Patient now on high-lina. No signs of right heart strain on POCUS.  CXR showed b/l opcities. Was unable to tolerate CTA ruled out PE.  Labs are significant for hyperkalemia 6.1, with tall T waves on my read, Calcium gluconate is ordered STAT and insulin push was already given by ER.  Dimer 562, pro-BNP 2718, Trop 0.18, Cr 2.9         (19 Mar 2021 14:40)      PAST MEDICAL & SURGICAL HISTORY:  Diverticulitis    Heart failure    Hypertension    Dyslipidemia    Diabetes    S/P partial resection of colon        FAMILY HISTORY:  FH: diabetes mellitus    FH: hyperlipidemia    FH: heart disease     Reviewed and found non contributory in mother or father    SOCIAL HISTORY:     ROS:    Unable to attain due to:                      Dyspnea: YES                    N/V (Y/N): No                             Secretions (Y/N) : YES                                       Agitation(Y/N): No                              Pain (Y/N): No                                 -Provocation/Palliation: N/A  -Quality/Quantity: N/A  -Radiating: N/A  -Severity: No pain  -Timing/Frequency: N/A  -Impact on ADLs: N/A    General:  Denied  HEENT:    Denied  Neck:  Denied  CVS:  Denied  Resp:  (+) dyspnea at rest   GI:  Denied    :  Denied  Musc:  Denied  Neuro:  Denied  Psych:  Denied  Skin:  Denied  Lymph:  Denied    Allergies    ACE inhibitors (Angioedema)    Intolerances      Opiate Naive (Y/N): yes  -iStop reviewed (Y/N): yes   Ref#:            #: [id number]    There are no results for the search terms that you entered.  Medications:      MEDICATIONS  (STANDING):  aspirin enteric coated 81 milliGRAM(s) Oral daily  atorvastatin 80 milliGRAM(s) Oral at bedtime  cefepime   IVPB      cefepime   IVPB 1000 milliGRAM(s) IV Intermittent every 12 hours  chlorhexidine 4% Liquid 1 Application(s) Topical daily  dexAMETHasone  Injectable 6 milliGRAM(s) IV Push daily  dextrose 5%. 1000 milliLiter(s) (75 mL/Hr) IV Continuous <Continuous>  fat emulsion (Fish Oil and Plant Based) 20% Infusion 0.71 Gm/kG/Day (31.4 mL/Hr) IV Continuous <Continuous>  glucagon  Injectable 1 milliGRAM(s) IntraMuscular once  heparin   Injectable 7500 Unit(s) SubCutaneous every 8 hours  influenza   Vaccine 0.5 milliLiter(s) IntraMuscular once  insulin lispro (ADMELOG) corrective regimen sliding scale   SubCutaneous three times a day before meals  melatonin 5 milliGRAM(s) Oral at bedtime  metoprolol succinate ER 25 milliGRAM(s) Oral daily  Parenteral Nutrition - Adult 1 Each (70 mL/Hr) TPN Continuous <Continuous>  polyethylene glycol 3350 17 Gram(s) Oral every 12 hours  senna 2 Tablet(s) Oral at bedtime  sodium zirconium cyclosilicate 10 Gram(s) Oral daily  tocilizumab IVPB 800 milliGRAM(s) IV Intermittent once    MEDICATIONS  (PRN):  acetaminophen   Tablet .. 650 milliGRAM(s) Oral every 8 hours PRN Mild Pain (1 - 3)  guaifenesin/dextromethorphan  Syrup 5 milliLiter(s) Oral every 6 hours PRN Cough      Labs:    CBC:                        13.0   9.24  )-----------( 77       ( 06 Apr 2021 06:44 )             41.1     CMP:    04-06    156<H>  |  115<H>  |  125<HH>  ----------------------------<  136<H>  5.4<H>   |  22  |  2.4<H>    Ca    9.5      06 Apr 2021 06:44  Phos  6.5     04-06  Mg     2.9     04-06    TPro  6.7  /  Alb  3.3<L>  /  TBili  1.3<H>  /  DBili  x   /  AST  32  /  ALT  42<H>  /  AlkPhos  170<H>  04-06     Albumin, Serum: 3.3 g/dL (04-06-21 @ 06:44)             Imaging:  Reviewed    PEx:  T(C): 35.9 (04-06-21 @ 08:00), Max: 35.9 (04-05-21 @ 20:00)  HR: 84 (04-06-21 @ 08:49) (84 - 119)  BP: 130/79 (04-06-21 @ 08:00) (110/70 - 143/83)  RR: 21 (04-06-21 @ 08:00) (21 - 31)  SpO2: 95% (04-06-21 @ 08:49) (85% - 95%)  Wt(kg): --  Daily     Daily     General: AAOx3    found in bed in some distress   HEENT:  NCAT PERRL EOMI Non icteric MOM  Neck: Supple no masses  CVS: RR S1S2 No M/G/R edema   Resp: Labored and tachypneic  with BIPAP on   GI:  BS+ large round Soft NT   :  Voiding / Lou / PrimaFit  Musc: No C/C/E    Neuro: Follows commands No focal deficits  Psych: Calm Pleasant  Skin: Non jaundiced   Lymph: Normal    Preadmit Karnofsky:  %           Current Karnofsky:     %  http://www.npcrc.org/files/news/karnofsky_performance_scale.pdf   http://www.npcrc.org/files/news/palliative_performance_scale_PPSv2.pdf  Cachexia (Y/N):   BMI:    Advanced Directives:  DNR/DNI today     Decision maker: The patient is able to participate in complex medical decision making conversations.   Legal surrogate: spouse Priscilla Karan    GOALS OF CARE DISCUSSION       Palliative care info/counseling provided	           Family meeting       Advanced Directives addressed please see Advance Care Planning Note	           See previous Palliative Medicine Note       Documentation of GOC: 	    REFERRALS	        Palliative Med        Unit SW/Case Mgmt              Speech/Swallow       Patient/Family Support       Massage Therapy       Music Therapy       Pet Therapy       Hospice       Nutrition       Dietician       PT/OT HAMMAD KING          MRN-[id number]              CC: SOB    HPI:  69 yo M with PMHx of CKD (Unknown baseline), HFrEF (On Entresto), HTN, HLD, DM (A1c 6.5 per patient), LGIB secondary to Diverticulitis s/p colon resection (at [facility] about 6.5 years ago) and CAD presenting today with SOB. Patient states he has had progressive SOB x 6 weeks however it has worsened in the last 2 weeks. Patient states he developed chills, and productive cough x 2 days. Endorses diarrhea and diffuse abdominal discomfort x 6 weeks as well. Denies chest pain though    Vital Signs Last 24 Hrs  T(F): 99.5 (19 Mar 2021 12:07), Max: 102.3 (19 Mar 2021 08:53)  HR: 71 (19 Mar 2021 12:07) (71 - 93)  BP: 150/92 (19 Mar 2021 12:07) (136/84 - 150/92)  RR: 24 (19 Mar 2021 13:18) (20 - 24)  SpO2: 100% (19 Mar 2021 13:18) (92% - 100%)    In ED patient was received septic on admission (HR 93, Temp 102.3, RR 20, WBC <2k, COVID +) lactate 1.1  Was hypoxic to 84% with minimal exertion in the stretcher. Patient on high-lina at this time. Patient initially saturating at 96% on 3L NC; however later desatted requiring over 6L NC. Patient now on high-lina. No signs of right heart strain on POCUS.  CXR showed b/l opcities. Was unable to tolerate CTA ruled out PE.  Labs are significant for hyperkalemia 6.1, with tall T waves on my read, Calcium gluconate is ordered STAT and insulin push was already given by ER.  Dimer 562, pro-BNP 2718, Trop 0.18, Cr 2.9   (19 Mar 2021 14:40)    PAtient seen and examined at bedside. He is on bipap. Patient denies pain, expressed that he does not understand medically what is going on. Expressed wish that " he wants to live long".          PAST MEDICAL & SURGICAL HISTORY:  Diverticulitis    Heart failure    Hypertension    Dyslipidemia    Diabetes    S/P partial resection of colon        FAMILY HISTORY:  FH: diabetes mellitus    FH: hyperlipidemia    FH: heart disease     Reviewed and found non contributory in mother or father    SOCIAL HISTORY: no smoking     ROS:  as below                     Dyspnea: YES                    N/V (Y/N): No                             Secretions (Y/N) : YES                                       Agitation(Y/N): No                              Pain (Y/N): No                                 -Provocation/Palliation: N/A  -Quality/Quantity: N/A  -Radiating: N/A  -Severity: No pain  -Timing/Frequency: N/A  -Impact on ADLs: N/A    General:  Denied  HEENT:    Denied  Neck:  Denied  CVS:  Denied  Resp:  (+) dyspnea at rest   GI:  Denied    :  Denied  Musc:  Denied  Neuro:  Denied  Psych:  Denied  Skin:  Denied  Lymph:  Denied    Allergies    ACE inhibitors (Angioedema)    Intolerances      Opiate Naive (Y/N): yes  -iStop reviewed (Y/N): yes   Ref#:            #: [id number]    There are no results for the search terms that you entered.  Medications:      MEDICATIONS  (STANDING):  aspirin enteric coated 81 milliGRAM(s) Oral daily  atorvastatin 80 milliGRAM(s) Oral at bedtime  cefepime   IVPB      cefepime   IVPB 1000 milliGRAM(s) IV Intermittent every 12 hours  chlorhexidine 4% Liquid 1 Application(s) Topical daily  dexAMETHasone  Injectable 6 milliGRAM(s) IV Push daily  dextrose 5%. 1000 milliLiter(s) (75 mL/Hr) IV Continuous <Continuous>  fat emulsion (Fish Oil and Plant Based) 20% Infusion 0.71 Gm/kG/Day (31.4 mL/Hr) IV Continuous <Continuous>  glucagon  Injectable 1 milliGRAM(s) IntraMuscular once  heparin   Injectable 7500 Unit(s) SubCutaneous every 8 hours  influenza   Vaccine 0.5 milliLiter(s) IntraMuscular once  insulin lispro (ADMELOG) corrective regimen sliding scale   SubCutaneous three times a day before meals  melatonin 5 milliGRAM(s) Oral at bedtime  metoprolol succinate ER 25 milliGRAM(s) Oral daily  Parenteral Nutrition - Adult 1 Each (70 mL/Hr) TPN Continuous <Continuous>  polyethylene glycol 3350 17 Gram(s) Oral every 12 hours  senna 2 Tablet(s) Oral at bedtime  sodium zirconium cyclosilicate 10 Gram(s) Oral daily  tocilizumab IVPB 800 milliGRAM(s) IV Intermittent once    MEDICATIONS  (PRN):  acetaminophen   Tablet .. 650 milliGRAM(s) Oral every 8 hours PRN Mild Pain (1 - 3)  guaifenesin/dextromethorphan  Syrup 5 milliLiter(s) Oral every 6 hours PRN Cough      Labs:    CBC:                        13.0   9.24  )-----------( 77       ( 06 Apr 2021 06:44 )             41.1     CMP:    04-06    156<H>  |  115<H>  |  125<HH>  ----------------------------<  136<H>  5.4<H>   |  22  |  2.4<H>    Ca    9.5      06 Apr 2021 06:44  Phos  6.5     04-06  Mg     2.9     04-06    TPro  6.7  /  Alb  3.3<L>  /  TBili  1.3<H>  /  DBili  x   /  AST  32  /  ALT  42<H>  /  AlkPhos  170<H>  04-06     Albumin, Serum: 3.3 g/dL (04-06-21 @ 06:44)      Imaging:  Reviewed    EKG: reviewed     PEx:  T(C): 35.9 (04-06-21 @ 08:00), Max: 35.9 (04-05-21 @ 20:00)  HR: 84 (04-06-21 @ 08:49) (84 - 119)  BP: 130/79 (04-06-21 @ 08:00) (110/70 - 143/83)  RR: 21 (04-06-21 @ 08:00) (21 - 31)  SpO2: 95% (04-06-21 @ 08:49) (85% - 95%)  Wt(kg): --  Daily     Daily     General: AAOx3    found in bed in some distress , bipap in place   HEENT:  NCAT PERRL EOMI Non icteric MOM  Neck: Supple no masses  CVS: RR S1S2 No M/G/R edema   Resp: Labored and tachypneic  with BIPAP on   GI:  BS+ large round Soft NT   : Lou   Musc: No C/C/E    Neuro: Follows commands No focal deficits  Psych: Calm Pleasant  Skin: Non jaundiced   Lymph: Normal    Preadmit Karnofsky:  %   50        Current Karnofsky:     %20  http://www.npcrc.org/files/news/karnofsky_performance_scale.pdf   http://www.npcrc.org/files/news/palliative_performance_scale_PPSv2.pdf  Cachexia (Y/N):   BMI:    Advanced Directives:  DNR/DNI today     Decision maker: The patient is able to participate in complex medical decision making conversations.   Legal surrogate: spouse Priscilla Russo    GOALS OF CARE DISCUSSION       Palliative care info/counseling provided	           Family meeting       Advanced Directives addressed please see Advance Care Planning Note	           See previous Palliative Medicine Note       Documentation of GOC: 	    REFERRALS	        Palliative Med        Unit SW/Case Mgmt              Speech/Swallow       Patient/Family Support       Massage Therapy       Music Therapy       Pet Therapy       Hospice       Nutrition       Dietician       PT/OT

## 2021-04-06 NOTE — CONSULT NOTE ADULT - PROBLEM SELECTOR RECOMMENDATION 3
Pt was followed by HF specialist, he is being recalled today to make recommendations. Primary team managing

## 2021-04-06 NOTE — PROGRESS NOTE ADULT - ASSESSMENT
69yo Man with medical history of HFrEF, HTN, DM2, and CAD here with acute hypoxic resp failure, due to covid, HFpEF.    #Acute hypoxic resp failure, due to acute on chronic HFrEF, covid  Pt with hx of HFrEF but now tte with grade I diastolic dysfunction, EF 50%  cta noted  currently on BIPAP FiO2 100% saturating 90%  requiring continuous bipap now  ongoing goc, full code for now  covid ab positive  c/w decadron 6mg daily   s/p rdv one dose - held due to worsening renal function  repeat inflammatory marker pending  fungitell pending  holding bumex 2 iv bid given worsening renal function  holding hypertonic saline for now, awaiting HF team f/u  nephrology suggests this is due to covid as opposed to fluid overload as pt has been neg balance for past few days      #Hypernatremia- worsening  awaiting heart failure f/u, holding hypertonic saline for now  nephro f/u appreciated; suggest holding bumex    #SAGE, presumed pre-renal, possible caleb  Cr increased again to 2.1 from low of 1.6 few days ago (was 3.5 at peak)  holding bumex 2 iv bid given worsening renal function  holding hypertonic saline for now, awaiting HF team f/u  already on lokelma for hyperkalemia, additional dose ordered for this evening  f/u repeat ekg  nephro f/u appreciated    #HTN  Metoprolol Succ 25    #DM2  controlled off medications  monitor FS, start insulin regimen if consistently >180    #CAD  asa  lipitor 80    DVT ppx 7500 units subq hep q8 as per protocol  GI ppx: NA  Diet: NPO for now on bipap  Code Status: Full Code  Updated wife, Joy, at 3 PM.  69yo Man with medical history of HFrEF, HTN, DM2, and CAD here with acute hypoxic resp failure, due to covid, HFpEF.    # Acute hypoxic resp failure, due to acute on chronic HFrEF + covid 19  # Lymphopenia and Thrombocytopenia  - s/p rdv one dose - held due to worsening renal function  - c/w decadron 6mg daily   - covid ab positive - NO plasma, VA duplex negative   - Inflammatory markers as above - may quality for TOCI  - start cefepime   - fungitell pending  - OT/PT    # Thrombocytopenia  - HIT unlikely, not on any meds that would explain  - cont to monitor    # CHF w/ improved EF, currently in exacerbation  - Pt with hx of HFrEF but now tte with grade I diastolic dysfunction, EF 50%  - HF specialist to follow up today  - Hold Hypertonic saline and Bumix  - daily weights, strict I's and O's    # SAGE On CKD  # Hypernatremia/Hyperkalemia  - c/w lokelma  - may have been over diuresis  - Hypertonic saline and Bumix on hold  - gentle hydration D5W @75cc/hr for 8 hours    # HTN - Metoprolol Succ 25  # DM2 - A1C 6.9, Sliding scale, monitor FS  # CAD - c/w ASA, BB, and statin  # Nutrition eval - BiPAP dependent, needs alternative mode of nutrition    # DVT ppx - Heparin SQ   # GI ppx - PPI   # Diet - NPO until nutrition rec  # COVID test - Positive  DNR/DNI     69yo Man with medical history of HFrEF, HTN, DM2, and CAD here with acute hypoxic resp failure, due to covid, HFpEF.    # Acute hypoxic resp failure, due to acute on chronic HFrEF + covid 19  # Lymphopenia and Thrombocytopenia  - s/p rdv one dose - held due to worsening renal function  - c/w decadron 6mg daily   - covid ab positive - NO plasma, VA duplex negative   - Inflammatory markers as above - may quality for TOCI  - start cefepime   - fungitell pending  - OT/PT    # Thrombocytopenia  - HIT unlikely, not on any meds that would explain  - cont to monitor    # CHF w/ improved EF, currently in exacerbation  - Pt with hx of HFrEF but now tte with grade I diastolic dysfunction, EF 50%  - HF specialist to follow up today  - Hold Hypertonic saline and Bumix  - daily weights, strict I's and O's    # SAGE On CKD  # Hypernatremia/Hyperkalemia  - c/w lokelma  - may have been over diuresis  - Hypertonic saline and Bumix on hold  - gentle hydration D5W @75cc/hr for 8 hours    # HTN - Metoprolol Succ 25  # DM2 - A1C 6.9, Sliding scale, monitor FS  # CAD - c/w ASA, BB, and statin  # Nutrition eval - BiPAP dependent, needs alternative mode of nutrition    # DVT ppx - Heparin SQ   # GI ppx - PPI   # Diet - NPO until nutrition rec  # COVID test - Positive  DNR/DNI    Spoke w/ wife, gave her clinical status update and today's [facility] wishes of Pt at 11:00am today.

## 2021-04-06 NOTE — PROGRESS NOTE ADULT - SUBJECTIVE AND OBJECTIVE BOX
[facility] NEPHROLOGY FOLLOW UP NOTE  --------------------------------------------------------------------------------  24 hour events/subjective: Patient examined. Appears comfortable on Bipap.    PAST HISTORY  --------------------------------------------------------------------------------  No significant changes to PMH, PSH, FHx, SHx, unless otherwise noted    ALLERGIES & MEDICATIONS  --------------------------------------------------------------------------------  Allergies    ACE inhibitors (Angioedema)    Standing Inpatient Medications  aspirin enteric coated 81 milliGRAM(s) Oral daily  atorvastatin 80 milliGRAM(s) Oral at bedtime  cefepime   IVPB      cefepime   IVPB 1000 milliGRAM(s) IV Intermittent every 12 hours  chlorhexidine 4% Liquid 1 Application(s) Topical daily  dexAMETHasone  Injectable 6 milliGRAM(s) IV Push daily  dextrose 5%. 1000 milliLiter(s) IV Continuous <Continuous>  glucagon  Injectable 1 milliGRAM(s) IntraMuscular once  heparin   Injectable 7500 Unit(s) SubCutaneous every 8 hours  influenza   Vaccine 0.5 milliLiter(s) IntraMuscular once  insulin lispro (ADMELOG) corrective regimen sliding scale   SubCutaneous three times a day before meals  melatonin 5 milliGRAM(s) Oral at bedtime  metoprolol succinate ER 25 milliGRAM(s) Oral daily  polyethylene glycol 3350 17 Gram(s) Oral every 12 hours  senna 2 Tablet(s) Oral at bedtime  sodium zirconium cyclosilicate 10 Gram(s) Oral daily  tocilizumab IVPB 800 milliGRAM(s) IV Intermittent once    PRN Inpatient Medications  acetaminophen   Tablet .. 650 milliGRAM(s) Oral every 8 hours PRN  guaifenesin/dextromethorphan  Syrup 5 milliLiter(s) Oral every 6 hours PRN      VITALS/PHYSICAL EXAM  --------------------------------------------------------------------------------  T(C): 35.9 (04-06-21 @ 08:00), Max: 35.9 (04-05-21 @ 20:00)  HR: 84 (04-06-21 @ 08:49) (84 - 119)  BP: 130/79 (04-06-21 @ 08:00) (110/70 - 143/83)  RR: 21 (04-06-21 @ 08:00) (21 - 31)  SpO2: 95% (04-06-21 @ 08:49) (85% - 95%)    04-05-21 @ 07:01  -  04-06-21 @ 07:00  --------------------------------------------------------  IN: 500 mL / OUT: 1600 mL / NET: -1100 mL    04-06-21 @ 07:01  -  04-06-21 @ 12:54  --------------------------------------------------------  IN: 0 mL / OUT: 400 mL / NET: -400 mL    Physical Exam:  	Gen: On Bipap  	Pulm:  B/L rales  	CV: RRR, S1S2  	Abd: +BS, soft, nontender/nondistended  	: No suprapubic tenderness  	LE: Warm, trace edema    LABS/STUDIES  --------------------------------------------------------------------------------              13.0   9.24  >-----------<  77       [04-06-21 @ 06:44]              41.1     156  |  115  |  125  ----------------------------<  136      [04-06-21 @ 06:44]  5.4   |  22  |  2.4        Ca     9.5     [04-06-21 @ 06:44]      Mg     2.9     [04-06-21 @ 06:44]    TPro  6.7  /  Alb  3.3  /  TBili  1.3  /  DBili  x   /  AST  32  /  ALT  42  /  AlkPhos  170  [04-06-21 @ 06:44]    Creatinine Trend:  SCr 2.4 [04-06 @ 06:44]  SCr 2.1 [04-05 @ 07:40]  SCr 1.8 [04-04 @ 11:56]  SCr 1.8 [04-04 @ 07:52]  SCr 1.7 [04-03 @ 04:30]    Urinalysis - [03-23-21 @ 09:35]      Color Yellow / Appearance Clear / SG 1.016 / pH 6.0      Gluc Negative / Ketone Negative  / Bili Negative / Urobili <2 mg/dL       Blood Small / Protein 300 mg/dL / Leuk Est Negative / Nitrite Negative      RBC 3 / WBC 3 / Hyaline 3 / Gran  / Sq Epi  / Non Sq Epi 1 / Bacteria Negative    Iron 58, TIBC 259, %sat 22      [03-31-21 @ 16:00]  Ferritin 1284      [04-05-21 @ 11:02]  HbA1c 6.1      [11-23-19 @ 08:02]  TSH 0.46      [03-20-21 @ 04:30]  Lipid: chol 87, TG 64, HDL 41, LDL --      [03-20-21 @ 04:30]

## 2021-04-06 NOTE — CONSULT NOTE ADULT - PROBLEM SELECTOR RECOMMENDATION 9
DNR/DNI, pt alert makes own health care decisions. Very advanced illness due to covid with HF and SAGE

## 2021-04-06 NOTE — CONSULT NOTE ADULT - SUBJECTIVE AND OBJECTIVE BOX
69 yo M with PMHx of CKD (Unknown baseline), HFrEF (On Entresto), HTN, HLD, DM (A1c 6.5 per patient), LGIB secondary to Diverticulitis s/p colon resection (at [facility] about 6.5 years ago) and CAD presenting  with SOB. Patient states he has had progressive SOB x 6 weeks however it has worsened in the last 2 weeks. Patient states he developed chills, and productive cough x 2 days. Endorses diarrhea and diffuse abdominal discomfort x 6 weeks as well. Denies chest pain though    Vital Signs Last 24 Hrs  T(F): 99.5 (19 Mar 2021 12:07), Max: 102.3 (19 Mar 2021 08:53)  HR: 71 (19 Mar 2021 12:07) (71 - 93)  BP: 150/92 (19 Mar 2021 12:07) (136/84 - 150/92)  RR: 24 (19 Mar 2021 13:18) (20 - 24)  SpO2: 100% (19 Mar 2021 13:18) (92% - 100%)  PAST MEDICAL & SURGICAL HISTORY:  Diverticulitis  Heart failure  Hypertension  Dyslipidemia  Diabetes   partial resection of colon  FAMILY HISTORY:  FH: diabetes mellitus  FH: hyperlipidemia  FH: heart disease     ICU Vital Signs Last 24 Hrs  T(C): 36 (06 Apr 2021 12:00), Max: 36 (06 Apr 2021 12:00)  T(F): 96.8 (06 Apr 2021 12:00), Max: 96.8 (06 Apr 2021 12:00)  HR: 88 (06 Apr 2021 12:00) (84 - 119)  BP: 133/76 (06 Apr 2021 12:00) (114/90 - 143/83)  BP(mean): 110 (05 Apr 2021 18:00) (99 - 110)  RR: 28 (06 Apr 2021 12:00) (21 - 30)  SpO2: 98% (06 Apr 2021 12:00) (85% - 98%)  Height (cm): 182.9 (03-20-21 @ 00:00)  Weight (kg): 141.4 (03-20-21 @ 00:00)  BMI (kg/m2): 42.3 (03-20-21 @ 00:00)  BSA (m2): 2.57 (03-20-21 @ 00:00)  05 Apr 2021 07:01  -  06 Apr 2021 07:00  --------------------------------------------------------  IN:    Oral Fluid: 500 mL  Total IN: 500 mL    OUT:    Voided (mL): 1600 mL  Total OUT: 1600 mL    Total NET: -1100 mL      06 Apr 2021 07:01  -  06 Apr 2021 14:30  --------------------------------------------------------  IN:  Total IN: 0 mL    OUT:    Voided (mL): 400 mL  Total OUT: 400 mL    Total NET: -400 mL    PHYSICAL EXAM:  GENERAL: resting comfortably on bipap  HEENT:  on bipap  ABDOMEN: Soft, n/t n/d +old scar no lesions  EXTREMITIES + edema  SKIN: No lesions  IV ACCESS: midline   FEEDING ACCESS:npo    MEDICATIONS  (STANDING):  aspirin enteric coated 81 milliGRAM(s) Oral daily  atorvastatin 80 milliGRAM(s) Oral at bedtime  cefepime   IVPB      cefepime   IVPB 1000 milliGRAM(s) IV Intermittent every 12 hours  chlorhexidine 4% Liquid 1 Application(s) Topical daily  dexAMETHasone  Injectable 6 milliGRAM(s) IV Push daily  dextrose 5%. 1000 milliLiter(s) (75 mL/Hr) IV Continuous <Continuous>  fat emulsion (Fish Oil and Plant Based) 20% Infusion 0.71 Gm/kG/Day (31.4 mL/Hr) IV Continuous <Continuous>  fondaparinux Injectable 2.5 milliGRAM(s) SubCutaneous daily  glucagon  Injectable 1 milliGRAM(s) IntraMuscular once  influenza   Vaccine 0.5 milliLiter(s) IntraMuscular once  insulin lispro (ADMELOG) corrective regimen sliding scale   SubCutaneous three times a day before meals  melatonin 5 milliGRAM(s) Oral at bedtime  metoprolol succinate ER 25 milliGRAM(s) Oral daily  Parenteral Nutrition - Adult 1 Each (70 mL/Hr) TPN Continuous <Continuous>  polyethylene glycol 3350 17 Gram(s) Oral every 12 hours  senna 2 Tablet(s) Oral at bedtime  sodium zirconium cyclosilicate 10 Gram(s) Oral daily  tocilizumab IVPB 800 milliGRAM(s) IV Intermittent once    MEDICATIONS  (PRN):  acetaminophen   Tablet .. 650 milliGRAM(s) Oral every 8 hours PRN Mild Pain (1 - 3)  guaifenesin/dextromethorphan  Syrup 5 milliLiter(s) Oral every 6 hours PRN Cough    Allergies  ACE inhibitors (Angioedema)    LABS:    04-06    156<H>  |  115<H>  |  125<HH>  ----------------------------<  136<H>  5.4<H>   |  22  |  2.4<H>    Ca    9.5      06 Apr 2021 06:44  Phos  6.5     04-06  Mg     2.9     04-06    TPro  6.7  /  Alb  3.3<L>  /  TBili  1.3<H>  /  DBili  x   /  AST  32  /  ALT  42<H>  /  AlkPhos  170<H>  04-06                          13.0   9.24  )-----------( 77       ( 06 Apr 2021 06:44 )             41.1     Lipid Profile (03.20.21 @ 04:30)   Cholesterol, Serum: 87 mg/dL   Triglycerides, Serum: 64 mg/dL   CAPILLARY BLOOD GLUCOSE  POCT Blood Glucose.: 155 mg/dL (06 Apr 2021 11:46)  RADIOLOGY:  < from: Xray Chest 1 View- PORTABLE-Routine (Xray Chest 1 View- PORTABLE-Routine in AM.) (04.06.21 @ 05:37) >  Impression:  Unchanged reticulonodular opacities. No pneumothorax.     69 yo M with PMHx of CKD (Unknown baseline), HFrEF (On Entresto), HTN, HLD, DM (A1c 6.5 per patient), LGIB secondary to Diverticulitis s/p colon resection (at [facility] about 6.5 years ago) and CAD presenting  with SOB. Patient states he has had progressive SOB x 6 weeks however it has worsened in the last 2 weeks. Patient states he developed chills, and productive cough x 2 days. Endorses diarrhea and diffuse abdominal discomfort x 6 weeks as well. Denies chest pain.  Progressively worsening resp status since admission. To be DNR and DNI per pt.  Renal function deteriorating.    Vital Signs Last 24 Hrs  T(F): 99.5 (19 Mar 2021 12:07), Max: 102.3 (19 Mar 2021 08:53)  HR: 71 (19 Mar 2021 12:07) (71 - 93)  BP: 150/92 (19 Mar 2021 12:07) (136/84 - 150/92)  RR: 24 (19 Mar 2021 13:18) (20 - 24)  SpO2: 100% (19 Mar 2021 13:18) (92% - 100%)  PAST MEDICAL & SURGICAL HISTORY:  Diverticulitis  Heart failure  Hypertension  Dyslipidemia  Diabetes   partial resection of colon  FAMILY HISTORY:  FH: diabetes mellitus  FH: hyperlipidemia  FH: heart disease     ICU Vital Signs Last 24 Hrs  T(C): 36 (06 Apr 2021 12:00), Max: 36 (06 Apr 2021 12:00)  T(F): 96.8 (06 Apr 2021 12:00), Max: 96.8 (06 Apr 2021 12:00)  HR: 88 (06 Apr 2021 12:00) (84 - 119)  BP: 133/76 (06 Apr 2021 12:00) (114/90 - 143/83)  BP(mean): 110 (05 Apr 2021 18:00) (99 - 110)  RR: 28 (06 Apr 2021 12:00) (21 - 30)  SpO2: 98% (06 Apr 2021 12:00) (85% - 98%)  Height (cm): 182.9 (03-20-21 @ 00:00)  Weight (kg): 141.4 (03-20-21 @ 00:00)  BMI (kg/m2): 42.3 (03-20-21 @ 00:00)  BSA (m2): 2.57 (03-20-21 @ 00:00)  05 Apr 2021 07:01  -  06 Apr 2021 07:00  --------------------------------------------------------  IN:    Oral Fluid: 500 mL  Total IN: 500 mL    OUT:    Voided (mL): 1600 mL  Total OUT: 1600 mL    Total NET: -1100 mL      06 Apr 2021 07:01  -  06 Apr 2021 14:30  --------------------------------------------------------  IN:  Total IN: 0 mL    OUT:    Voided (mL): 400 mL  Total OUT: 400 mL    Total NET: -400 mL    PHYSICAL EXAM:  GENERAL: resting comfortably on bipap, alert  HEENT:  on bipap  ABDOMEN: Soft, n/t n/d +old scar no lesions  EXTREMITIES + edema  SKIN: No lesions  IV ACCESS: midline   FEEDING ACCESS:npo    MEDICATIONS  (STANDING):  aspirin enteric coated 81 milliGRAM(s) Oral daily  atorvastatin 80 milliGRAM(s) Oral at bedtime  cefepime   IVPB      cefepime   IVPB 1000 milliGRAM(s) IV Intermittent every 12 hours  chlorhexidine 4% Liquid 1 Application(s) Topical daily  dexAMETHasone  Injectable 6 milliGRAM(s) IV Push daily  dextrose 5%. 1000 milliLiter(s) (75 mL/Hr) IV Continuous <Continuous>  fat emulsion (Fish Oil and Plant Based) 20% Infusion 0.71 Gm/kG/Day (31.4 mL/Hr) IV Continuous <Continuous>  fondaparinux Injectable 2.5 milliGRAM(s) SubCutaneous daily  glucagon  Injectable 1 milliGRAM(s) IntraMuscular once  influenza   Vaccine 0.5 milliLiter(s) IntraMuscular once  insulin lispro (ADMELOG) corrective regimen sliding scale   SubCutaneous three times a day before meals  melatonin 5 milliGRAM(s) Oral at bedtime  metoprolol succinate ER 25 milliGRAM(s) Oral daily  Parenteral Nutrition - Adult 1 Each (70 mL/Hr) TPN Continuous <Continuous>  polyethylene glycol 3350 17 Gram(s) Oral every 12 hours  senna 2 Tablet(s) Oral at bedtime  sodium zirconium cyclosilicate 10 Gram(s) Oral daily  tocilizumab IVPB 800 milliGRAM(s) IV Intermittent once    MEDICATIONS  (PRN):  acetaminophen   Tablet .. 650 milliGRAM(s) Oral every 8 hours PRN Mild Pain (1 - 3)  guaifenesin/dextromethorphan  Syrup 5 milliLiter(s) Oral every 6 hours PRN Cough    Allergies  ACE inhibitors (Angioedema)    LABS:    04-06    156<H>  |  115<H>  |  125<HH>  ----------------------------<  136<H>  5.4<H>   |  22  |  2.4<H>    Ca    9.5      06 Apr 2021 06:44  Phos  6.5     04-06  Mg     2.9     04-06    TPro  6.7  /  Alb  3.3<L>  /  TBili  1.3<H>  /  DBili  x   /  AST  32  /  ALT  42<H>  /  AlkPhos  170<H>  04-06                          13.0   9.24  )-----------( 77       ( 06 Apr 2021 06:44 )             41.1     Lipid Profile (03.20.21 @ 04:30)   Cholesterol, Serum: 87 mg/dL   Triglycerides, Serum: 64 mg/dL   CAPILLARY BLOOD GLUCOSE  POCT Blood Glucose.: 155 mg/dL (06 Apr 2021 11:46)  RADIOLOGY:  < from: Xray Chest 1 View- PORTABLE-Routine (Xray Chest 1 View- PORTABLE-Routine in AM.) (04.06.21 @ 05:37) >  Impression:  Unchanged reticulonodular opacities. No pneumothorax.

## 2021-04-06 NOTE — PHYSICAL THERAPY INITIAL EVALUATION ADULT - SPECIFY REASON(S)
Patient refusing to participate in therapy today. Patient very agitated when encouraged to do therapy. MD made aware. Will follow-up.

## 2021-04-06 NOTE — PROGRESS NOTE ADULT - ASSESSMENT
IMPRESSION:    Acute hypoxemic respiratory failure still on 100% bipap  Severe COVID pneumonia;  possible bacterial superinfection  HFmrEF  Probable SAIDA   SAGE on CKD worsenins  hypernatremia    PLAN:    CNS: NO depressants     HEENT: Oral care    PULMONARY:  HOB @ 45 degrees.  Aspiration precautions.  Wean O2.  May start BIPAP 12/6 at bedtime.    CARDIOVASCULAR:  Avoid volume overload . hold bumex today    GI: GI prophylaxis.  Feeding.  Bowel regimen     RENAL:  Follow up lytes.  Correct as needed. lokelma. fu renal    INFECTIOUS DISEASE: repeat procal. ABX per ID, fungitell    HEMATOLOGICAL:  DVT prophylaxis.  LE doppler neg    ENDOCRINE:  Follow up FS.     MUSCULOSKELETAL:  Bed Chair position   very poor prognosis   GOC

## 2021-04-06 NOTE — PROGRESS NOTE ADULT - ATTENDING COMMENTS
Pt is sitting in a chair - currently on NRB and pulse ox is 97%  He desaturated to 88% on 6L NC after eating breakfast today.  Urinated > 300cc after IV lasix 40mg given last night  renal f/u today  would give higher dose of lasix IV if renal wants to continue diuresis for now  he has hyponatremia, volume overload, acidosis and hyperkalemia - if no improvement, he may need RRT  guarded prognosis  continue step down unit monitoring  keep pulse ox 92% or greater  daily labs      PROGRESS NOTE HANDOFF    Pending: renal f/u, daily labs, CXR in AM    Disposition: from home
patient seen and examined independently on morning rounds for the first time today in [facility] (COVID-SDU), chart reviewed and discussed with the medicine resident and on interdisciplinary rounds and agree with the above resident progress note with the following addendum:    -oob in chair- reports no chest pain but persistant dyspnea with movement or exertion  -remains on NRB with o2 sat 93-95%- continuing to trend bun/cr (slowly improving renal function---suspect SAGE -2/2 ATN---cont lokelma and bicarb=- nephrology following  -will continue to monitor closely--ween O2 as tolerated  -finish course azithro/ceftriaxone (elevated procal---suspect 2/2 bacterial pneumonia)  -RDV stopped 2/2 SAGE  -continue dexamethasone 6 mg iv daily    DVT/GI ppx  guarded prognosis
#Acute hypoxic resp failure, due to acute on chronic HFpEF, covid  covid ab positive  decadron  s/p rdv one dose  bumex 2 iv bid  hypertonic saline per heart failure  cta noted  tte with grade I diastolic dysfunction  requiring high flow 80 lpm, 100%  #SAGE, presumed pre-renal, possible caleb  trend scr  improving  f/u renal
Pt seen and examined independently. He slept in the chair last night.  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         PROGRESS NOTE HANDOFF    Pending: daily labs, pulm f/u, CXR in AM    Disposition: from home
Pt seen this morning. He is sitting in a chair and frustrated over the long hospital course.  CXR with slightly increased opacities b/l - official report: stable  He remains on HFNC 50L/85% - pulse ox 92-94%  he is in negative balance but admits to drinking water throughout the day  Pt educated on fluid restriction and order placed  lasix changed to q12hr today and re-evaluate  case discussed with nephrology attending today  heart failure consult placed and in progress  SAGE improving  K 5.5 today - should improve with lasix    chest - decreased bS b/l  CVS - RRR  abdomen - soft, NT, ND  extremities + LE edema (improved per pt) with ACE wraps        PROGRESS NOTE HANDOFF    Pending: labs and CXR in AM, heart failure consult, titrate O2 as tolerated    Disposition: from home
#Acute hypoxic resp failure, due to acute on chronic HFpEF, covid  desat to 80s on 100% nrb, high flow at 80 lpm 100%; placed on bipap with improvement  ongoing goc, full code for now  covid ab positive  decadron  s/p rdv one dose  bumex 2 iv bid  hypertonic saline per heart failure  cta noted  tte with grade I diastolic dysfunction  #SAGE, presumed pre-renal, possible caleb  trend scr  improving  f/u renal
Mr Mcgill is a 71yo Man with medical history of HFrEF, HTN, DM2, and CAD here with acute hypoxic resp failure, due to covid, HFpEF.    #Acute hypoxic resp failure, due to acute on chronic HFrEF, covid  Pt with hx of HFrEF but now tte with grade I diastolic dysfunction, EF 50%  cta noted  currently on BIPAP FiO2 100% saturating 90%  requiring continuous bipap  ongoing goc, full code for now  covid ab positive  c/w decadron 6mg daily   s/p rdv one dose - held due to worsening renal function  hold bumex 2 iv bid given worsening renal function  hypertonic saline per heart failure  Nephrology recs appreciated - recommending trending BMP, hold bumex, cont lokelma      #Hypernatremia  with hyperchloremia  HF recs appreciated  nephro recs appreciated    #SAGE, presumed pre-renal, possible caleb  trend scr inr 2.4 today  c/b hyperkalemia, check ekg, lokelma  nephro recs appreciated     #HTN  Metoprolol Succ 25    #DM2  controlled off medications    #CAD  asa  lipitor 80    DVT ppx  subq hep  DNR/DNI    Progress Note Handoff:  Pending: adequate diuresis, improvement in renal function, oxygen titration  Dispo: Acute  Family: House staff updated
patient seen and examined, agree with above, covid/ AHRF/ HHFNC/ BIPAP at night,  LE doppler, diuresis

## 2021-04-06 NOTE — PROGRESS NOTE ADULT - ASSESSMENT
Acute severe COVID pneumonia  Acute hypoxemic respiratory failure  Acute on CKD (baseline serum creatinine 1.5 mg/dL)  Systolic CHF - usually NYHA Class 2-3 now Class 4    Suggest:    Continue Lokelma   Holding [facility]  Heart failure team followup  Monitor I/O  Trend BMP

## 2021-04-06 NOTE — CONSULT NOTE ADULT - PROVIDER SPECIALTY LIST ADULT
Heart Failure
Vascular Surgery
Infectious Disease
Nephrology
Nutrition Support
Critical Care
Palliative Care

## 2021-04-07 LAB
FUNGITELL: 32 PG/ML — SIGNIFICANT CHANGE UP
HEPARIN-PF4 AB RESULT: <0.6 U/ML — SIGNIFICANT CHANGE UP (ref 0–0.9)
PF4 HEPARIN CMPLX AB SER-ACNC: NEGATIVE — SIGNIFICANT CHANGE UP

## 2021-04-16 DIAGNOSIS — J96.01 ACUTE RESPIRATORY FAILURE WITH HYPOXIA: ICD-10-CM

## 2021-04-16 DIAGNOSIS — N18.30 CHRONIC KIDNEY DISEASE, STAGE 3 UNSPECIFIED: ICD-10-CM

## 2021-04-16 DIAGNOSIS — E87.5 HYPERKALEMIA: ICD-10-CM

## 2021-04-16 DIAGNOSIS — E83.39 OTHER DISORDERS OF PHOSPHORUS METABOLISM: ICD-10-CM

## 2021-04-16 DIAGNOSIS — E11.22 TYPE 2 DIABETES MELLITUS WITH DIABETIC CHRONIC KIDNEY DISEASE: ICD-10-CM

## 2021-04-16 DIAGNOSIS — U07.1 COVID-19: ICD-10-CM

## 2021-04-16 DIAGNOSIS — Z87.891 PERSONAL HISTORY OF NICOTINE DEPENDENCE: ICD-10-CM

## 2021-04-16 DIAGNOSIS — E83.41 HYPERMAGNESEMIA: ICD-10-CM

## 2021-04-16 DIAGNOSIS — I25.10 ATHEROSCLEROTIC HEART DISEASE OF NATIVE CORONARY ARTERY WITHOUT ANGINA PECTORIS: ICD-10-CM

## 2021-04-16 DIAGNOSIS — Z79.82 LONG TERM (CURRENT) USE OF ASPIRIN: ICD-10-CM

## 2021-04-16 DIAGNOSIS — D75.82 HEPARIN INDUCED THROMBOCYTOPENIA (HIT): ICD-10-CM

## 2021-04-16 DIAGNOSIS — I50.23 ACUTE ON CHRONIC SYSTOLIC (CONGESTIVE) HEART FAILURE: ICD-10-CM

## 2021-04-16 DIAGNOSIS — G47.33 OBSTRUCTIVE SLEEP APNEA (ADULT) (PEDIATRIC): ICD-10-CM

## 2021-04-16 DIAGNOSIS — E66.9 OBESITY, UNSPECIFIED: ICD-10-CM

## 2021-04-16 DIAGNOSIS — Z66 DO NOT RESUSCITATE: ICD-10-CM

## 2021-04-16 DIAGNOSIS — N17.0 ACUTE KIDNEY FAILURE WITH TUBULAR NECROSIS: ICD-10-CM

## 2021-04-16 DIAGNOSIS — I13.0 HYPERTENSIVE HEART AND CHRONIC KIDNEY DISEASE WITH HEART FAILURE AND STAGE 1 THROUGH STAGE 4 CHRONIC KIDNEY DISEASE, OR UNSPECIFIED CHRONIC KIDNEY DISEASE: ICD-10-CM

## 2021-04-16 DIAGNOSIS — A41.89 OTHER SPECIFIED SEPSIS: ICD-10-CM

## 2021-04-16 DIAGNOSIS — E11.51 TYPE 2 DIABETES MELLITUS WITH DIABETIC PERIPHERAL ANGIOPATHY WITHOUT GANGRENE: ICD-10-CM

## 2021-04-16 DIAGNOSIS — E87.0 HYPEROSMOLALITY AND HYPERNATREMIA: ICD-10-CM

## 2021-04-16 DIAGNOSIS — J12.82 PNEUMONIA DUE TO CORONAVIRUS DISEASE 2019: ICD-10-CM

## 2021-06-18 NOTE — PRE-ANESTHESIA EVALUATION ADULT - NSANTHTIREDRD_ENT_A_CORE
I have reviewed the surgical (or preoperative) H&P that is linked to this encounter, and examined the patient. There are no significant changes  
Yes

## 2023-04-07 NOTE — PROGRESS NOTE ADULT - ASSESSMENT
a/p:    #Acute hypoxic respiratory failure 2/2 Covid-19 viral pneumonia  -continue isolation precuations  -remains on HF (55L/70%)---titrate down o2 needs as tolerated however patient with slow progress or improvement  -repeat DDimer higher but duplex repeat negative for dvt  -finish course azithro/ceftriaxone (elevated procal---suspect 2/2 bacterial pneumonia)  -RDV stopped 2/2 SAGE  -continue dexamethasone 6 mg iv daily  -covid antibody +  -encourage Incentive spirometry    #SAGE 2/2 ATN/contrast dye  -cont to monitor bmp closely  -nephrology following  -cr improving (today 1.6)  -lasix iv  daily--->titrate down dose to 40 mg iv daily and monitor monitor i/o and urine output as well as bp closely (CXR daily showing improvement)  -cont lokelma and bicarb and monitor electrolytes    #PVD  -vascular following---bilateral dressing change  -duplex negative for dvt   -continue hep sq for dvt ppx    DVT/GI ppx  guarded prognosis     #Progress Note Handoff  Pending (specify): remains on NCF  Family discussion: d/w patient himself   Disposition: Home_ x (when medically stable) No 24

## 2024-11-07 NOTE — REVIEW OF SYSTEMS
RN DIABETES EDUCATION PROGRESS NOTE    Farzad Morrow was seen from 1430 to 1515 for CGMs start  Time spent in data collection: 0 minutes  Time spent in direct communication with patient: 45 minutes    Referred by: CRISTINA Munoz*  Diagnosis: Controlled type 2 diabetes mellitus without complication, with long-term current use of insulin  (CMD)  (primary encounter diagnosis)    Previous diabetes education?  yes  Preferences for learning:               Verbal  [x]              Written [x]              Demonstration [x]              Discussion [x]    Patient selected the following goal(s):      Hemoglobin A1C (%)   Date Value   10/07/2024 8.1 (H)     Cholesterol (mg/dL)   Date Value   10/07/2024 114      HDL (mg/dL)   Date Value   10/07/2024 67    No components found for: \"LDL\"   Triglycerides (mg/dL)   Date Value   10/07/2024 60      LDL (mg/dL)   Date Value   10/07/2024 35      Creatinine (mg/dL)   Date Value   10/07/2024 0.79       He is currently up to date on all essential care guidelines .    BP:   BP Readings from Last 1 Encounters:   11/06/24 136/80    WEIGHT:    Wt Readings from Last 1 Encounters:   11/06/24 95.9 kg (211 lb 6.7 oz)    HEIGHT:   Ht Readings from Last 1 Encounters:   11/06/24 5' 11\" (1.803 m)        Current Outpatient Medications   Medication Sig Dispense Refill    metoPROLOL succinate (TOPROL-XL) 25 MG 24 hr tablet Take 0.5 tablets by mouth daily. 45 tablet 3    insulin glargine 100 UNIT/ML pen-injector Inject 24 Units into the skin nightly. Prime 2 units before each dose. 30 mL 11    insulin aspart (NovoLOG FlexPen ReliOn) 100 UNIT/ML pen-injector INJECT 8  IN THE MORNING AND 8 AT NOON AND 8 IN THE EVENING BEFORE MEAL(S). PRIME 2 UNITS BEFORE EACH DOSE. Prime 2 units before each dose. 99 mL 3    Continuous Glucose Sensor (Dexcom G7 Sensor) Misc Change Sensor every 10 days.  Please provide 90 day supply. 9 each 1    enalapril (VASOTEC) 5 MG tablet Take 1 tablet by mouth daily.  90 tablet 3    metFORMIN (GLUCOPHAGE-XR) 500 MG 24 hr tablet TAKE 4 TABLETS BY MOUTH ONCE DAILY IN THE EVENING 360 tablet 3    Lancets (OneTouch Delica Plus [id number]) Misc USE 1  TO CHECK GLUCOSE ONCE DAILY AS DIRECTED 100 each 3    rosuvastatin (CRESTOR) 20 MG tablet Take 1 tablet by mouth nightly. 90 tablet 3    blood glucose (OneTouch Ultra) test strip USE 1 STRIP TO CHECK GLUCOSE ONCE DAILY 100 each 3    ipratropium (ATROVENT) 0.03 % nasal spray Spray 2 sprays in each nostril every 12 hours. 30 mL 12    cetirizine (ZyrTEC) 10 MG tablet Take 10 mg by mouth daily.      blood glucose meter Test blood sugar 1 times daily as directed. Diagnosis: e11.9 Meter: One touch or what ever is covered by insurance 1 kit 0    Insulin Pen Needle (PEN NEEDLES 3/16\") 31G X 5 MM Misc 1 each 3 times daily (with meals). 100 each 11     No current facility-administered medications for this visit.       Highlights of today’s visit:  Farzad is seen in diabetic education today to initiate his Dexcom G7. He presents today with his wife and gives permission to discuss health information in her presence. Each piece of the system is unboxed and identified.  He  will not be using the device . Attempted to download the appropriate santy on the smart phone, however, an error message was received that the phone's IOS does not support the santy and the phone is not able to upgrade to the necessary IOS. Patient's wife states she was afraid of this as she just upgraded her phone but patient did not want to at the time. Offered to order the Dexcom G7  for patient to  at the pharmacy to use instead of having to get a new phone, however, patient and wife declined, stating they want to upgrade the phone and then be able to use that for the Dexcom. [place] for subsequent appointment once they receive the upgraded phone. Patient's wife had  already found the Dexcom website and had been watching some of the tutorial videos prior to today's visit. They are encouraged to reference these once they obtain the new phone since they decline coming back for further instruction in person. They are encouraged to follow up with diabetes education in [place] as needed after December 11th as there will then be an educator in that office, which is much closer for them to attend appointments.    He is informed that the sensor is water proof and can be worn for bathing and swimming.  It is explained that the sensor should not be worn for MRI, CT or Xray and should not be worn through a full body scanner when flying. He understands that there is a 30 minute warm up period in which he will not receive any glucose readings or alarms. It is understood that the sensor is to be changed every 10 days.  Using the provided instruction booklet, he is shown how the screen will display the current BG, colors and trend arrows as well as the graph of the preceding hours. Instruction is provided on using the BG and arrow to make treatment decisions.  He is instructed on proper removal and disposal of the sensor and sensor insertion device.  Customer service numbers are shared and encouraged to be used for questions, trouble shooting or for replacement sensors if one fails or falls off early.       Teaching was provided on the following:    [x] Blood glucose monitoring and goals         He was provided blood glucose monitoring goals of  fasting and 180 or less 2 hour post prandially.    [x] He was instructed on the use of his blood glucose monitor. Topics discussed included hand hygiene, site selection and rotation, utilization of lancets and test strips, test times, and the importance of bringing meter or logbook to all diabetes related appointments. He was recommended to test his blood sugar 4  times per day, at the following times: before each meal and bedtime.  Sharps disposal was discussed in detail.     [x] Continuous Glucose Monitor (CGM) Dexcom G7 difference between interstitial glucose and blood glucose, frequency of sensor change, distance between sensors and reader, trend arrows, when to check finger stick.     [x] Hypoglycemia signs, symptoms, treatment using rule of 15 (Medical ID, test before driving, carry rapid acting sugar source)    [x] Hyperglycemia signs, symptoms, treatment, when to contact MD      Smoking cessation was not necessary as patient is not an active smoker     The patient will need review on the following topics: Dexcom set up with phone    Written materials provided were:    Dexcom G7 pamphlets   Material was presented using verbal, written, demonstration and return demonstration.    The instruction was given to the patient, spouse, and gives permission to discuss personal information during appointment..    Recommended Follow-up:  Follow-up appointment: RN - patient declined - too far to drive again  Patient  will not enroll in Diabetes Education classes. Individual education due to no group class available for 2 months.  [email or web address]  The patient was encouraged to call back if any questions or concerns.    Report sent to referring provider.  Provider order located in EMR.  See Patient Instructions for further information.  Thank you for your referral.  Please contact me with any questions/concerns    Lorelei Small, RN  [facility]  Collaborating Physician: Dr. Mateusz Barrow  On-site Provider: Dr. Barrow.      DM Care Plan   Diabetes Care Plan      Type of Instruction: Individual                Type of Education: Post program    Length of visit: 45 minutes    Barriers to self care: Other   Other barriers: location   ----------------------------------------------------------------------------------------------------------------  1. Diabetes disease process/overview and treatment options  2.  Monitoring  a. Blood glucose (purpose, testing times, care of meter/test strips, correct technique, keeping a record, lancet disposal and alternative site testing-if applicable): Instructed/competent  b. Sharps disposal: Instructed/competent  c. Action for results outside target range: Instructed/needs review  3. Diabetes Medications        4. Physical Activity  5. Psychosocial Strategies  6. Nutrition  7. Acute/Chronic Complications (prevention, detection, treatment)  a.  Hypoglycemia (risk, causes, signs, symptoms, treatment and prevention): Instructed/needs review  b.  Hypoglycemia unawareness: Instructed/needs review  c. Problem solving:  Including when to contact physician/diabetes care team/Need for medical identification use: Instructed/needs review  f.  Hyperglycemia (risk, causes, signs, symptoms, treatment, prevention): Instructed/needs review  8. Strategies to Promote Health/Behavioral Changes  9. Insulin Pump  10. Continuous Glucose Monitoring  Continuous Glucose Monitoring: Purpose, correct sensor location, insertion & care, alerts & alarms, calibration, , actions for glucose results outside target range: Instructed/needs review  Notes: Dexcom G7  11. Diabetes Self-Management Support Plan  PCP/Endocrinology Appointment, Diabetes Educator Contact Number, Family Support, Diabetes Support Website/Phone Apps  12. Goals  Monitoring New check bg before meals/bedtime until able to start Dexcom G7   Taking medications New take as prescribed   Risk reduction New carry fast acting sugar; treat lows per rule of 15                    [Limb Swelling] : limb swelling
